# Patient Record
Sex: MALE | Race: WHITE | NOT HISPANIC OR LATINO | Employment: OTHER | URBAN - METROPOLITAN AREA
[De-identification: names, ages, dates, MRNs, and addresses within clinical notes are randomized per-mention and may not be internally consistent; named-entity substitution may affect disease eponyms.]

---

## 2017-04-21 ENCOUNTER — HISTORICAL (OUTPATIENT)
Dept: ADMINISTRATIVE | Facility: HOSPITAL | Age: 77
End: 2017-04-21

## 2017-04-21 LAB
ALBUMIN SERPL-MCNC: 4.1 G/DL (ref 3.1–4.7)
ALP SERPL-CCNC: 51 IU/L (ref 40–104)
ALT (SGPT): 20 IU/L (ref 3–33)
AST SERPL-CCNC: 23 IU/L (ref 10–40)
BASOPHILS NFR BLD: 0.1 K/UL (ref 0–0.2)
BASOPHILS NFR BLD: 1.7 %
BILIRUB SERPL-MCNC: 0.4 MG/DL (ref 0.3–1)
BUN SERPL-MCNC: 29 MG/DL (ref 8–20)
CALCIUM SERPL-MCNC: 9.3 MG/DL (ref 7.7–10.4)
CHLORIDE: 103 MMOL/L (ref 98–110)
CO2 SERPL-SCNC: 29 MMOL/L (ref 22.8–31.6)
CREATININE: 1.45 MG/DL (ref 0.6–1.4)
EOSINOPHIL NFR BLD: 0.2 K/UL (ref 0–0.7)
EOSINOPHIL NFR BLD: 6 %
ERYTHROCYTE [DISTWIDTH] IN BLOOD BY AUTOMATED COUNT: 13.3 % (ref 11.7–14.9)
FERRITIN SERPL-MCNC: 37 NG/ML (ref 37–201)
FOLATE SERPL-MCNC: >24.8 NG/ML (ref 2.2–11.2)
GLUCOSE: 167 MG/DL (ref 70–99)
GRAN #: 1.8 K/UL (ref 1.4–6.5)
GRAN%: 59.7 %
HCT VFR BLD AUTO: 36.6 % (ref 39–55)
HGB BLD-MCNC: 12 G/DL (ref 14–16)
IMMATURE GRANS (ABS): 0 K/UL (ref 0–1)
IMMATURE GRANULOCYTES: 0 %
LYMPH #: 0.7 K/UL (ref 1.2–3.4)
LYMPH%: 24.5 %
MCH RBC QN AUTO: 31.9 PG (ref 25–35)
MCHC RBC AUTO-ENTMCNC: 32.8 G/DL (ref 31–36)
MCV RBC AUTO: 97.3 FL (ref 80–100)
MONO #: 0.2 K/UL (ref 0.1–0.6)
MONO%: 8.1 %
NUCLEATED RBCS: 0 %
PLATELET # BLD AUTO: 142 K/UL (ref 140–440)
PMV BLD AUTO: 10.2 FL (ref 8.8–12.7)
POTASSIUM SERPL-SCNC: 4 MMOL/L (ref 3.5–5)
PROT SERPL-MCNC: 6.7 G/DL (ref 6–8.2)
RBC # BLD AUTO: 3.76 M/UL (ref 4.3–5.9)
SODIUM: 141 MMOL/L (ref 134–144)
TSH SERPL DL<=0.005 MIU/L-ACNC: 2.98 ULU/ML (ref 0.3–5.6)
VITAMIN B12: 1147 PG/ML (ref 62–940)
WBC # BLD AUTO: 3 K/UL (ref 5–10)

## 2017-04-22 LAB
TESTOST SERPL-MCNC: 412 NG/DL (ref 348–1197)
TESTOSTERONE, FREE: NORMAL

## 2017-05-24 ENCOUNTER — TELEPHONE (OUTPATIENT)
Dept: HEMATOLOGY/ONCOLOGY | Facility: CLINIC | Age: 77
End: 2017-05-24

## 2017-05-24 NOTE — TELEPHONE ENCOUNTER
Pt wife called in said that on their last appointment with dr padilla on 05/09/17 Dr. padilla said he was going to write a letter about his condition and send it to him and all his physicians. We do not have record of this letter in paper chart or in epic. Please advise.

## 2017-05-27 ENCOUNTER — TELEPHONE (OUTPATIENT)
Dept: HEMATOLOGY/ONCOLOGY | Facility: CLINIC | Age: 77
End: 2017-05-27

## 2017-09-07 LAB
BASOPHILS NFR BLD: 0.1 K/UL (ref 0–0.2)
BASOPHILS NFR BLD: 1.4 %
EOSINOPHIL NFR BLD: 0.1 K/UL (ref 0–0.7)
EOSINOPHIL NFR BLD: 3.6 %
ERYTHROCYTE [DISTWIDTH] IN BLOOD BY AUTOMATED COUNT: 13.2 % (ref 11.7–14.9)
FERRITIN SERPL-MCNC: 107 NG/ML (ref 37–201)
GRAN #: 2 K/UL (ref 1.4–6.5)
GRAN%: 55.1 %
HCT VFR BLD AUTO: 34.3 % (ref 39–55)
HGB BLD-MCNC: 11.8 G/DL (ref 14–16)
IMMATURE GRANS (ABS): 0 K/UL (ref 0–1)
IMMATURE GRANULOCYTES: 0.6 %
LYMPH #: 1 K/UL (ref 1.2–3.4)
LYMPH%: 28.7 %
MCH RBC QN AUTO: 32.5 PG (ref 25–35)
MCHC RBC AUTO-ENTMCNC: 34.4 G/DL (ref 31–36)
MCV RBC AUTO: 94.5 FL (ref 80–100)
MONO #: 0.4 K/UL (ref 0.1–0.6)
MONO%: 10.6 %
NUCLEATED RBCS: 0 %
PLATELET # BLD AUTO: 148 K/UL (ref 140–440)
PMV BLD AUTO: 10 FL (ref 8.8–12.7)
RBC # BLD AUTO: 3.63 M/UL (ref 4.3–5.9)
WBC # BLD AUTO: 3.6 K/UL (ref 5–10)

## 2017-09-13 ENCOUNTER — OFFICE VISIT (OUTPATIENT)
Dept: HEMATOLOGY/ONCOLOGY | Facility: CLINIC | Age: 77
End: 2017-09-13
Payer: MEDICARE

## 2017-09-13 VITALS
SYSTOLIC BLOOD PRESSURE: 93 MMHG | TEMPERATURE: 98 F | HEART RATE: 82 BPM | RESPIRATION RATE: 18 BRPM | WEIGHT: 190.5 LBS | DIASTOLIC BLOOD PRESSURE: 64 MMHG | BODY MASS INDEX: 28.22 KG/M2 | HEIGHT: 69 IN

## 2017-09-13 DIAGNOSIS — Q27.30 ARTERIOVENOUS MALFORMATION (AVM): ICD-10-CM

## 2017-09-13 DIAGNOSIS — C61 PROSTATE CANCER: ICD-10-CM

## 2017-09-13 DIAGNOSIS — C00.1: ICD-10-CM

## 2017-09-13 DIAGNOSIS — D50.0 IRON DEFICIENCY ANEMIA DUE TO CHRONIC BLOOD LOSS: ICD-10-CM

## 2017-09-13 PROCEDURE — 1159F MED LIST DOCD IN RCRD: CPT | Mod: ,,, | Performed by: INTERNAL MEDICINE

## 2017-09-13 PROCEDURE — 99214 OFFICE O/P EST MOD 30 MIN: CPT | Mod: ,,, | Performed by: INTERNAL MEDICINE

## 2017-09-13 PROCEDURE — 1126F AMNT PAIN NOTED NONE PRSNT: CPT | Mod: ,,, | Performed by: INTERNAL MEDICINE

## 2017-09-13 PROCEDURE — 3008F BODY MASS INDEX DOCD: CPT | Mod: ,,, | Performed by: INTERNAL MEDICINE

## 2017-09-13 RX ORDER — MULTIVITAMIN
1 TABLET ORAL DAILY
COMMUNITY

## 2017-09-13 RX ORDER — ASCORBIC ACID, CHOLECALCIFEROL, PYRIDOXINE HYDROCHLORIDE, LEVOMEFOLATE MAGNESIUM, FOLIC ACID, CYANOCOBALAMIN, AND IRON PENTACARBONYL 170; 1000; 15; 600; 400; 16; 125 MG/1; [IU]/1; MG/1; UG/1; UG/1; UG/1; MG/1
1 TABLET ORAL DAILY
Refills: 2 | COMMUNITY
Start: 2017-06-20 | End: 2021-09-13

## 2017-09-13 RX ORDER — TEMAZEPAM 15 MG/1
30 CAPSULE ORAL NIGHTLY
COMMUNITY

## 2017-09-13 RX ORDER — POTASSIUM CITRATE 10 MEQ/1
20 TABLET, EXTENDED RELEASE ORAL 2 TIMES DAILY
COMMUNITY

## 2017-09-13 NOTE — LETTER
September 15, 2017      Justin Mitchell MD  [address]  [place] [address]           [facility] Hematology Oncology  [address]  [address]  [facility] [address]  Phone: [phone]  Fax: [phone]          Patient: Michael Hawkins   MR Number: [id number]   YOB: 1940   Date of Visit: 9/13/2017       Dear Dr. Justin Mitchell:    Thank you for referring Michael Hawkins to me for evaluation. Attached you will find relevant portions of my assessment and plan of care.    If you have questions, please do not hesitate to call me. I look forward to following Michael Hawkins along with you.    Sincerely,    Lisbeth Aguilar  CC:  No Recipients    If you would like to receive this communication electronically, please contact [email or web address] or [phone] to request more information on [employer] Link access.    For providers and/or their staff who would like to refer a patient to [facility], please contact us through our one-stop-shop provider referral line, [facility] , at [phone].    If you feel you have received this communication in error or would no longer like to receive these types of communications, please e-mail [email or web address]

## 2017-09-17 PROBLEM — Q27.30 ARTERIOVENOUS MALFORMATION (AVM): Status: ACTIVE | Noted: 2017-09-17

## 2017-09-17 PROBLEM — C00.1: Status: ACTIVE | Noted: 2017-09-17

## 2017-09-17 PROBLEM — C61 PROSTATE CANCER: Status: ACTIVE | Noted: 2017-09-17

## 2017-09-17 PROBLEM — D50.0 IRON DEFICIENCY ANEMIA DUE TO CHRONIC BLOOD LOSS: Status: ACTIVE | Noted: 2017-09-17

## 2017-09-17 NOTE — PROGRESS NOTES
[facility] History & Physical    Subjective:      Patient ID:   Michael Hawkins  77 y.o. male  1940  Adan Brandt Perninkle      Chief Complaint:   Anemia follow up    HPI:  77 y.o. male with diagnosis of Fe deficiency anemia, hx of AVM's, cauterized.  Also diverticular dx and radiation proctitis.   Hx of prostate cancer treated with radium seeds.  Also cancer of lip treated with radiation 2010.  Rectal bleeding x's 3 years, radiation proctitis, H/H 6/17.    On synthroid, decrease plavix qod.  Dr. Davis.    Hx HTN, CAD, cholesterol, thyroid, PE, DVT, bipolar hx, sleep apnea, prostate cancer.  Carotid artery dx R > L.    Hx R renal cancer, cryosurgery, Dr. Barton  Coronary stent x's 2 8/2016.    Smoke 1 1/2 ppd x's 25 years, through 1985.  ETOH no  Job  x's 20 years.    Mom bone cancer  Dad heart dx    ROS:   GEN: normal without any fever, night sweats or weight loss  HEENT: See HPI.   no HA's, sore throat, stiff neck, changes in vision  CV: See HPI.   no CP, SOB, PND, ORTEGA or orthopnea  PULM: normal with no SOB, cough, hemoptysis, sputum or pleuritic pain  GI: See HPI.  : See HPI.  BREAST: normal with no mass, discharge, pain  SKIN: normal with no rash, erythema, bruising, or swelling     Past Medical History:   Diagnosis Date    Basal cell carcinoma     Hypertension     Nonmelanoma skin cancer     Prostate cancer      Past Surgical History:   Procedure Laterality Date    FOREARM HARDWARE REMOVAL         Review of patient's allergies indicates:  No Known Allergies  Social History     Social  History    Marital status:      Spouse name: N/A    Number of children: N/A    Years of education: N/A     Occupational History    Not on file.     Social History Main Topics    Smoking status: Former Smoker     Types: Cigarettes     Quit date: 9/13/1992    Smokeless tobacco: Never Used    Alcohol use No    Drug use: No    Sexual activity: Not on file     Other Topics Concern    Not on file     Social History Narrative    No narrative on file         Current Outpatient Prescriptions:     alendronate (FOSAMAX) 70 MG tablet, Take 70 mg by mouth every 7 days., Disp: , Rfl:     amlodipine (NORVASC) 5 MG tablet, Take 5 mg by mouth once daily., Disp: , Rfl:     aspirin 81 MG Chew, Take 81 mg by mouth once daily., Disp: , Rfl:     carbamazepine (TEGRETOL) 200 mg tablet, Take 200 mg by mouth 3 (three) times daily., Disp: , Rfl:     cholecalciferol, vitamin D3, 400 unit Tab, Take by mouth once daily., Disp: , Rfl:     cloNIDine (CATAPRES) 0.1 MG tablet, Take 0.1 mg by mouth 2 (two) times daily., Disp: , Rfl:     clopidogrel (PLAVIX) 75 mg tablet, Take 75 mg by mouth once daily., Disp: , Rfl:     co-enzyme Q-10 30 mg capsule, Take 100 mg by mouth 3 (three) times daily., Disp: , Rfl:     cyclobenzaprine (FLEXERIL) 10 MG tablet, Take 10 mg by mouth 3 (three) times daily as needed for Muscle spasms., Disp: , Rfl:     fish oil-omega-3 fatty acids 300-1,000 mg capsule, Take 2 g by mouth once daily., Disp: , Rfl:     FLUNISOLIDE INHL, Inhale into the lungs., Disp: , Rfl:     fosinopril (MONOPRIL) 40 MG tablet, Take 40 mg by mouth once daily., Disp: , Rfl:     isosorbide mononitrate (IMDUR) 30 MG 24 hr tablet, Take 30 mg by mouth once daily., Disp: , Rfl:     LACTOBAC NO.41/BIFIDOBACT NO.7 (PROBIOTIC-10 ORAL), Take by mouth., Disp: , Rfl:     levothyroxine (SYNTHROID) 150 MCG tablet, Take 150 mcg by mouth once daily., Disp: , Rfl:     metoprolol tartrate (LOPRESSOR) 50 MG tablet, Take 50 mg by mouth  "2 (two) times daily., Disp: , Rfl:     multivitamin (ONE DAILY MULTIVITAMIN) per tablet, Take 1 tablet by mouth once daily., Disp: , Rfl:     nitroGLYCERIN 0.4 MG/DOSE TL SPRY (NITROLINGUAL) 400 mcg/spray spray, Place 1 spray under the tongue every 5 (five) minutes as needed for Chest pain., Disp: , Rfl:     NUFERA 125 mg-1 mg-170 mg-1,000 unit Tab, TK 1 T PO QD, Disp: , Rfl: 2    oxybutynin (DITROPAN-XL) 10 MG 24 hr tablet, Take 10 mg by mouth once daily., Disp: , Rfl:     POTASSIUM CITRATE ORAL, Take by mouth., Disp: , Rfl:     psyllium (METAMUCIL) packet, Take 1 packet by mouth once daily., Disp: , Rfl:     quetiapine (SEROQUEL XR) 400 MG Tb24, Take by mouth once daily., Disp: , Rfl:     rosuvastatin (CRESTOR) 5 MG tablet, Take 40 mg by mouth once daily., Disp: , Rfl:     temazepam (RESTORIL) 22.5 MG capsule, Take 22.5 mg by mouth nightly as needed for Insomnia., Disp: , Rfl:           Objective:   Vitals:  Blood pressure 93/64, pulse 82, temperature 97.9 °F (36.6 °C), resp. rate 18, height 5' 9" (1.753 m), weight 86.4 kg (190 lb 8 oz).    Physical Examination:   GEN: no apparent distress, comfortable  HEAD: atraumatic and normocephalic  EYES: no pallor, no icterus  ENT:no pharyngeal erythema, external ears WNL; no nasal discharge; no thrush  NECK: no masses, thyroid normal, trachea midline, no LAD/LN's, supple  CV: RRR with no murmur; normal pulse; normal S1 and S2; no pedal edema  CHEST: Normal respiratory effort; CTAB; normal breath sounds; no wheeze or crackles  ABDOM: nontender and nondistended; soft; normal bowel sounds; no rebound/guarding  MUSC/Skeletal: ROM normal; no crepitus; joints normal  EXTREM: no clubbing, cyanosis, inflammation or swelling  SKIN: no rashes, lesions, ulcers, petechia  : no alvarez  NEURO: grossly intact; motor/sensory WNL;  no tremors  PSYCH: normal mood, affect and behavior  LYMPH: normal cervical, supraclavicular, axillary and groin LN's      Labs:   Lab Results "   Component Value Date    WBC 3.6 (L) 09/07/2017    HGB 11.8 (L) 09/07/2017    HCT 34.3 (L) 09/07/2017    MCV 94.5 09/07/2017     09/07/2017    CMP  Sodium   Date Value Ref Range Status   04/21/2017 141 134 - 144 mmol/L      Potassium   Date Value Ref Range Status   04/21/2017 4.0 3.5 - 5.0 mmol/L      Chloride   Date Value Ref Range Status   04/21/2017 103 98 - 110 mmol/L      CO2   Date Value Ref Range Status   04/21/2017 29.0 22.8 - 31.6 mmol/L      Glucose   Date Value Ref Range Status   04/21/2017 167 (H) 70 - 99 mg/dL      BUN, Bld   Date Value Ref Range Status   04/21/2017 29 (H) 8 - 20 mg/dL      Creatinine   Date Value Ref Range Status   04/21/2017 1.45 (H) 0.60 - 1.40 mg/dL      Calcium   Date Value Ref Range Status   04/21/2017 9.3 7.7 - 10.4 mg/dL      Total Protein   Date Value Ref Range Status   04/21/2017 6.7 6.0 - 8.2 g/dL      Albumin   Date Value Ref Range Status   04/21/2017 4.1 3.1 - 4.7 g/dL      Total Bilirubin   Date Value Ref Range Status   04/21/2017 0.4 0.3 - 1.0 mg/dL      Alkaline Phosphatase   Date Value Ref Range Status   04/21/2017 51 40 - 104 IU/L      AST   Date Value Ref Range Status   04/21/2017 23 10 - 40 IU/L      I have reviewed all available lab results and radiology reports.    Radiology/Diagnostic Studies:    Hgb 12.8, WBC 3,700, plt cnt 71,000.  MCV 91.  B 12 1136, TSH 6.29,  PSA 0.02.      All lab results and imaging results have been reviewed and discussed with the patient    Assessment:   (1) 77 y.o. male with diagnosis of Fe deficiency anemia, 2nd GI bleeding, 2nd AVM's 2nd Rad Rx.       Better after cauterization of AVM's.    (2)Prostate cancer hx, renal cancer, lower lip cancer.    (3)CAD, carotid dx          1. Iron deficiency anemia due to chronic blood loss    2. Arteriovenous malformation (AVM)    3. Prostate cancer    4. Cancer of external lower lip        Plan:     Return in about 4 months (around 1/13/2018) for check of blood status after  therapy.

## 2018-01-24 LAB
BASOPHILS NFR BLD: 0.1 K/UL (ref 0–0.2)
BASOPHILS NFR BLD: 1 %
EOSINOPHIL NFR BLD: 0.2 K/UL (ref 0–0.7)
EOSINOPHIL NFR BLD: 3.1 %
ERYTHROCYTE [DISTWIDTH] IN BLOOD BY AUTOMATED COUNT: 12.5 % (ref 11.7–14.9)
FERRITIN SERPL-MCNC: 74 NG/ML (ref 37–201)
GRAN #: 3 K/UL (ref 1.4–6.5)
GRAN%: 61.8 %
HCT VFR BLD AUTO: 38 % (ref 39–55)
HGB BLD-MCNC: 12.9 G/DL (ref 14–16)
IMMATURE GRANS (ABS): 0 K/UL (ref 0–1)
IMMATURE GRANULOCYTES: 0.8 %
LYMPH #: 1 K/UL (ref 1.2–3.4)
LYMPH%: 21.3 %
MCH RBC QN AUTO: 31.2 PG (ref 25–35)
MCHC RBC AUTO-ENTMCNC: 33.9 G/DL (ref 31–36)
MCV RBC AUTO: 92 FL (ref 80–100)
MONO #: 0.6 K/UL (ref 0.1–0.6)
MONO%: 12 %
NUCLEATED RBCS: 0 %
PLATELET # BLD AUTO: 165 K/UL (ref 140–440)
PMV BLD AUTO: 9.3 FL (ref 8.8–12.7)
RBC # BLD AUTO: 4.13 M/UL (ref 4.3–5.9)
WBC # BLD AUTO: 4.8 K/UL (ref 5–10)

## 2018-01-31 ENCOUNTER — OFFICE VISIT (OUTPATIENT)
Dept: HEMATOLOGY/ONCOLOGY | Facility: CLINIC | Age: 78
End: 2018-01-31
Payer: MEDICARE

## 2018-01-31 VITALS
SYSTOLIC BLOOD PRESSURE: 102 MMHG | DIASTOLIC BLOOD PRESSURE: 67 MMHG | HEIGHT: 69 IN | BODY MASS INDEX: 29.25 KG/M2 | TEMPERATURE: 98 F | WEIGHT: 197.5 LBS | RESPIRATION RATE: 18 BRPM | HEART RATE: 64 BPM

## 2018-01-31 DIAGNOSIS — Q27.30 ARTERIOVENOUS MALFORMATION (AVM): Primary | ICD-10-CM

## 2018-01-31 DIAGNOSIS — D50.0 IRON DEFICIENCY ANEMIA DUE TO CHRONIC BLOOD LOSS: ICD-10-CM

## 2018-01-31 PROCEDURE — 99214 OFFICE O/P EST MOD 30 MIN: CPT | Mod: ,,, | Performed by: INTERNAL MEDICINE

## 2018-01-31 PROCEDURE — 1159F MED LIST DOCD IN RCRD: CPT | Mod: ,,, | Performed by: INTERNAL MEDICINE

## 2018-01-31 RX ORDER — POTASSIUM CITRATE 10 MEQ/1
10 TABLET, EXTENDED RELEASE ORAL DAILY
COMMUNITY
Start: 2017-12-12 | End: 2021-09-13

## 2018-01-31 RX ORDER — ROSUVASTATIN CALCIUM 40 MG/1
40 TABLET, COATED ORAL DAILY
COMMUNITY
Start: 2018-01-15 | End: 2021-09-13 | Stop reason: SDUPTHER

## 2018-01-31 RX ORDER — LACTULOSE 10 G/15ML
15 SOLUTION ORAL; RECTAL DAILY PRN
COMMUNITY
Start: 2018-01-22 | End: 2021-09-13

## 2018-01-31 NOTE — LETTER
February 3, 2018      Justin Mitchell MD  [address]  [place] [address]           [facility] Hematology Oncology  [address]  [address]  [facility] [address]  Phone: [phone]  Fax: [phone]          Patient: Michael Hawkins   MR Number: [id number]   YOB: 1940   Date of Visit: 1/31/2018       Dear Dr. Justin Mitchell:    Thank you for referring Michael Hawkins to me for evaluation. Attached you will find relevant portions of my assessment and plan of care.    If you have questions, please do not hesitate to call me. I look forward to following Michael Hawkins along with you.    Sincerely,    KAMARI Paris MD    Enclosure  CC:  No Recipients    If you would like to receive this communication electronically, please contact [email or web address] or [phone] to request more information on [employer] Link access.    For providers and/or their staff who would like to refer a patient to [facility], please contact us through our one-stop-shop provider referral line, [facility], at [phone].    If you feel you have received this communication in error or would no longer like to receive these types of communications, please e-mail [email or web address]

## 2018-02-03 NOTE — PROGRESS NOTES
[facility] History & Physical    Subjective:      Patient ID:   Michael Hawkins  77 y.o. male  1940  Adan Brandt Perninkle      Chief Complaint:   Anemia follow up    HPI:  77 y.o. male with diagnosis of Fe deficiency anemia, hx of AVM's, cauterized.  Also diverticular dx and radiation proctitis.   Hx of prostate cancer treated with radium seeds.  Also cancer of lip treated with radiation 2010.  Rectal bleeding x's 3 years, radiation proctitis, H/H 6/17.    S/P cauterization, no further BRBPR.  Was on Fe x's 6 months, now off Fe x's 4 months.  Hgb 13, Ferritin 107 to 74.  Resume FeSO4 and Vitamen C daily.  Refill lactulose.    On synthroid, decrease plavix qod.  Dr. Davis.    Hx HTN, CAD, cholesterol, thyroid, PE, DVT, bipolar hx, sleep apnea, prostate cancer.  Carotid artery dx R > L.    Hx R renal cancer, cryosurgery, Dr. Barton  Coronary stent x's 2 8/2016.    Smoke 1 1/2 ppd x's 25 years, through 1985.  ETOH no  Job  x's 20 years.    Mom bone cancer  Dad heart dx    ROS:   GEN: normal without any fever, night sweats or weight loss  HEENT: See HPI.   no HA's, sore throat, stiff neck, changes in vision  CV: See HPI.   no CP, SOB, PND, ORTEGA or orthopnea  PULM: normal with no SOB, cough, hemoptysis, sputum or pleuritic pain  GI: See HPI.  : See HPI.  BREAST: normal with no mass, discharge, pain  SKIN: normal with no rash, erythema, bruising, or swelling     Past Medical History:   Diagnosis Date    Basal cell carcinoma     Hypertension     Nonmelanoma skin cancer     Prostate cancer      Past  Surgical History:   Procedure Laterality Date    FOREARM HARDWARE REMOVAL         Review of patient's allergies indicates:  No Known Allergies  Social History     Social History    Marital status:      Spouse name: N/A    Number of children: N/A    Years of education: N/A     Occupational History    Not on file.     Social History Main Topics    Smoking status: Former Smoker     Types: Cigarettes     Quit date: 9/13/1992    Smokeless tobacco: Never Used    Alcohol use No    Drug use: No    Sexual activity: Not on file     Other Topics Concern    Not on file     Social History Narrative    No narrative on file         Current Outpatient Prescriptions:     alendronate (FOSAMAX) 70 MG tablet, Take 70 mg by mouth every 7 days., Disp: , Rfl:     amlodipine (NORVASC) 5 MG tablet, Take 5 mg by mouth once daily., Disp: , Rfl:     aspirin 81 MG Chew, Take 81 mg by mouth once daily., Disp: , Rfl:     carbamazepine (TEGRETOL) 200 mg tablet, Take 200 mg by mouth 3 (three) times daily., Disp: , Rfl:     cholecalciferol, vitamin D3, 400 unit Tab, Take by mouth once daily., Disp: , Rfl:     cloNIDine (CATAPRES) 0.1 MG tablet, Take 0.1 mg by mouth 2 (two) times daily., Disp: , Rfl:     clopidogrel (PLAVIX) 75 mg tablet, Take 75 mg by mouth once daily., Disp: , Rfl:     co-enzyme Q-10 30 mg capsule, Take 100 mg by mouth 3 (three) times daily., Disp: , Rfl:     cyclobenzaprine (FLEXERIL) 10 MG tablet, Take 10 mg by mouth 3 (three) times daily as needed for Muscle spasms., Disp: , Rfl:     fish oil-omega-3 fatty acids 300-1,000 mg capsule, Take 2 g by mouth once daily., Disp: , Rfl:     FLUNISOLIDE INHL, Inhale into the lungs., Disp: , Rfl:     fosinopril (MONOPRIL) 40 MG tablet, Take 40 mg by mouth once daily., Disp: , Rfl:     isosorbide mononitrate (IMDUR) 30 MG 24 hr tablet, Take 30 mg by mouth once daily., Disp: , Rfl:     LACTOBAC NO.41/BIFIDOBACT NO.7 (PROBIOTIC-10 ORAL), Take by mouth., Disp: ,  "Rfl:     lactulose (CHRONULAC) 10 gram/15 mL solution, , Disp: , Rfl:     levothyroxine (SYNTHROID) 150 MCG tablet, Take 150 mcg by mouth once daily., Disp: , Rfl:     metoprolol tartrate (LOPRESSOR) 50 MG tablet, Take 50 mg by mouth 2 (two) times daily., Disp: , Rfl:     multivitamin (ONE DAILY MULTIVITAMIN) per tablet, Take 1 tablet by mouth once daily., Disp: , Rfl:     nitroGLYCERIN 0.4 MG/DOSE TL SPRY (NITROLINGUAL) 400 mcg/spray spray, Place 1 spray under the tongue every 5 (five) minutes as needed for Chest pain., Disp: , Rfl:     NUFERA 125 mg-1 mg-170 mg-1,000 unit Tab, TK 1 T PO QD, Disp: , Rfl: 2    oxybutynin (DITROPAN-XL) 10 MG 24 hr tablet, Take 10 mg by mouth once daily., Disp: , Rfl:     POTASSIUM CITRATE ORAL, Take by mouth., Disp: , Rfl:     psyllium (METAMUCIL) packet, Take 1 packet by mouth once daily., Disp: , Rfl:     quetiapine (SEROQUEL XR) 400 MG Tb24, Take by mouth once daily., Disp: , Rfl:     rosuvastatin (CRESTOR) 40 MG Tab, , Disp: , Rfl:     temazepam (RESTORIL) 22.5 MG capsule, Take 22.5 mg by mouth nightly as needed for Insomnia., Disp: , Rfl:     UROCIT-K 10 10 mEq (1,080 mg) TbSR, , Disp: , Rfl:           Objective:   Vitals:  Blood pressure 102/67, pulse 64, temperature 97.9 °F (36.6 °C), resp. rate 18, height 5' 9" (1.753 m), weight 89.6 kg (197 lb 8 oz).    Physical Examination:   GEN: no apparent distress, comfortable  HEAD: atraumatic and normocephalic  EYES: no pallor, no icterus  ENT:no pharyngeal erythema, external ears WNL; no nasal discharge; no thrush  NECK: no masses, thyroid normal, trachea midline, no LAD/LN's, supple  CV: RRR with no murmur; normal pulse; normal S1 and S2; no pedal edema  CHEST: Normal respiratory effort; CTAB; normal breath sounds; no wheeze or crackles  ABDOM: nontender and nondistended; soft; normal bowel sounds; no rebound/guarding  MUSC/Skeletal: ROM normal; no crepitus; joints normal  EXTREM: no clubbing, cyanosis, inflammation or " swelling  SKIN: no rashes, lesions, ulcers, petechia  : no alvarez  NEURO: grossly intact; motor/sensory WNL;  no tremors  PSYCH: normal mood, affect and behavior  LYMPH: normal cervical, supraclavicular, axillary and groin LN's      Labs:   Lab Results   Component Value Date    WBC 4.8 (L) 01/24/2018    HGB 12.9 (L) 01/24/2018    HCT 38.0 (L) 01/24/2018    MCV 92.0 01/24/2018     01/24/2018    CMP  Sodium   Date Value Ref Range Status   04/21/2017 141 134 - 144 mmol/L      Potassium   Date Value Ref Range Status   04/21/2017 4.0 3.5 - 5.0 mmol/L      Chloride   Date Value Ref Range Status   04/21/2017 103 98 - 110 mmol/L      CO2   Date Value Ref Range Status   04/21/2017 29.0 22.8 - 31.6 mmol/L      Glucose   Date Value Ref Range Status   04/21/2017 167 (H) 70 - 99 mg/dL      BUN, Bld   Date Value Ref Range Status   04/21/2017 29 (H) 8 - 20 mg/dL      Creatinine   Date Value Ref Range Status   04/21/2017 1.45 (H) 0.60 - 1.40 mg/dL      Calcium   Date Value Ref Range Status   04/21/2017 9.3 7.7 - 10.4 mg/dL      Total Protein   Date Value Ref Range Status   04/21/2017 6.7 6.0 - 8.2 g/dL      Albumin   Date Value Ref Range Status   04/21/2017 4.1 3.1 - 4.7 g/dL      Total Bilirubin   Date Value Ref Range Status   04/21/2017 0.4 0.3 - 1.0 mg/dL      Alkaline Phosphatase   Date Value Ref Range Status   04/21/2017 51 40 - 104 IU/L      AST   Date Value Ref Range Status   04/21/2017 23 10 - 40 IU/L      I have reviewed all available lab results and radiology reports.    Radiology/Diagnostic Studies:    Hgb 12.8, WBC 3,700, plt cnt 71,000.  MCV 91.  B 12 1136, TSH 6.29,  PSA 0.02.  Hgb 13. Ferritin 107 to 74.    Assessment:   (1) 77 y.o. male with diagnosis of Fe deficiency anemia,      2nd GI bleeding, 2nd AVM's 2nd Rad Rx.       Better after cauterization of AVM's.    (2)Prostate cancer hx, renal cancer, lower lip cancer.    (3)CAD, carotid dx    FeSO4, Vitamen C daily.  [facility] lab CBC, ferritin  And RTC 4 months,  5/2018.

## 2018-06-07 LAB
FERRITIN SERPL-MCNC: 69 NG/ML (ref 37–201)
HCT VFR BLD AUTO: 39.8 % (ref 39–55)
HGB BLD-MCNC: 13.2 G/DL (ref 14–16)
MCH RBC QN AUTO: 31.7 PG (ref 25–35)
MCHC RBC AUTO-ENTMCNC: 33.2 G/DL (ref 31–36)
MCV RBC AUTO: 95.7 FL (ref 80–100)
NUCLEATED RBCS: 0 %
PLATELET # BLD AUTO: 200 K/UL (ref 140–440)
RBC # BLD AUTO: 4.16 M/UL (ref 4.3–5.9)
WBC # BLD AUTO: 5.9 K/UL (ref 5–10)

## 2018-06-12 ENCOUNTER — OFFICE VISIT (OUTPATIENT)
Dept: HEMATOLOGY/ONCOLOGY | Facility: CLINIC | Age: 78
End: 2018-06-12
Payer: MEDICARE

## 2018-06-12 VITALS
DIASTOLIC BLOOD PRESSURE: 57 MMHG | WEIGHT: 182 LBS | HEART RATE: 90 BPM | HEIGHT: 69 IN | TEMPERATURE: 98 F | BODY MASS INDEX: 26.96 KG/M2 | SYSTOLIC BLOOD PRESSURE: 97 MMHG

## 2018-06-12 DIAGNOSIS — D50.0 IRON DEFICIENCY ANEMIA DUE TO CHRONIC BLOOD LOSS: Primary | ICD-10-CM

## 2018-06-12 DIAGNOSIS — C00.1: ICD-10-CM

## 2018-06-12 DIAGNOSIS — C61 PROSTATE CANCER: ICD-10-CM

## 2018-06-12 DIAGNOSIS — Q27.30 ARTERIOVENOUS MALFORMATION (AVM): ICD-10-CM

## 2018-06-12 PROCEDURE — 99214 OFFICE O/P EST MOD 30 MIN: CPT | Mod: ,,, | Performed by: INTERNAL MEDICINE

## 2018-06-12 RX ORDER — AMOXICILLIN AND CLAVULANATE POTASSIUM 875; 125 MG/1; MG/1
TABLET, FILM COATED ORAL
Refills: 0 | COMMUNITY
Start: 2018-04-04 | End: 2020-05-01

## 2018-06-12 NOTE — LETTER
June 12, 2018      Justin Mitchell MD  [address]  [place] [address]           [facility] - Hematology Oncology  [address]  [address]  [facility] [address]  Phone: [phone]  Fax: [phone]          Patient: Michael Hawkins   MR Number: [id number]   YOB: 1940   Date of Visit: 6/12/2018       Dear Dr. Justin Mitchell:    Thank you for referring Michael Hawkins to me for evaluation. Attached you will find relevant portions of my assessment and plan of care.    If you have questions, please do not hesitate to call me. I look forward to following Michael Hawkins along with you.    Sincerely,    KAMARI Paris MD    Enclosure  CC:  No Recipients    If you would like to receive this communication electronically, please contact [email or web address] or [phone] to request more information on [employer] Link access.    For providers and/or their staff who would like to refer a patient to [facility], please contact us through our one-stop-shop provider referral line, [facility], at [phone].    If you feel you have received this communication in error or would no longer like to receive these types of communications, please e-mail [email or web address]

## 2018-06-12 NOTE — PROGRESS NOTES
[facility] History & Physical    Subjective:      Patient ID:   Mihcael Hawkins  78 y.o. male  1940  Adan Brandt Perninkle      Chief Complaint:   Anemia follow up    HPI:  78 y.o. male with diagnosis of Fe deficiency anemia, hx of AVM's, cauterized.  Also diverticular dx and radiation proctitis.   Hx of prostate cancer treated with radium seeds.  Also cancer of lip treated with radiation 2010.  Rectal bleeding x's 3 years, radiation proctitis, H/H 6/17.    S/P cauterization, no further BRBPR.  Was on Fe x's 6 months, now off Fe x's 4 months.  Hgb 13, Ferritin 107 to 74.  Resume FeSO4 and Vitamen C daily.  Refill lactulose.    On synthroid, decrease plavix qod.  Dr. Davis.    Hx HTN, CAD, cholesterol, thyroid, PE, DVT, bipolar hx, sleep apnea, prostate cancer.  Carotid artery dx R > L.    Hx R renal cancer, cryosurgery, Dr. Barton  Coronary stent x's 2 8/2016.    Smoke 1 1/2 ppd x's 25 years, through 1985.  ETOH no  Job  x's 20 years.    Mom bone cancer  Dad heart dx    ROS:   GEN: normal without any fever, night sweats or weight loss  HEENT: See HPI.   no HA's, sore throat, stiff neck, changes in vision  CV: See HPI.   no CP, SOB, PND, ORTEGA or orthopnea  PULM: normal with no SOB, cough, hemoptysis, sputum or pleuritic pain  GI: See HPI.  : See HPI.  BREAST: normal with no mass, discharge, pain  SKIN: normal with no rash, erythema, bruising, or swelling     Past Medical History:   Diagnosis Date    Basal cell carcinoma     Hypertension     Nonmelanoma skin cancer     Prostate cancer      Past  Surgical History:   Procedure Laterality Date    FOREARM HARDWARE REMOVAL         Review of patient's allergies indicates:  No Known Allergies  Social History     Social History    Marital status:      Spouse name: N/A    Number of children: N/A    Years of education: N/A     Occupational History    Not on file.     Social History Main Topics    Smoking status: Former Smoker     Types: Cigarettes     Quit date: 9/13/1992    Smokeless tobacco: Never Used    Alcohol use No    Drug use: No    Sexual activity: Not on file     Other Topics Concern    Not on file     Social History Narrative    No narrative on file         Current Outpatient Prescriptions:     alendronate (FOSAMAX) 70 MG tablet, Take 70 mg by mouth every 7 days., Disp: , Rfl:     amlodipine (NORVASC) 5 MG tablet, Take 5 mg by mouth once daily., Disp: , Rfl:     amoxicillin-clavulanate 875-125mg (AUGMENTIN) 875-125 mg per tablet, TK 1 T PO Q 12 H FOR 10 DAYS, Disp: , Rfl: 0    aspirin 81 MG Chew, Take 81 mg by mouth once daily., Disp: , Rfl:     carbamazepine (TEGRETOL) 200 mg tablet, Take 200 mg by mouth 3 (three) times daily., Disp: , Rfl:     cholecalciferol, vitamin D3, 400 unit Tab, Take by mouth once daily., Disp: , Rfl:     cloNIDine (CATAPRES) 0.1 MG tablet, Take 0.1 mg by mouth 2 (two) times daily., Disp: , Rfl:     clopidogrel (PLAVIX) 75 mg tablet, Take 75 mg by mouth once daily., Disp: , Rfl:     co-enzyme Q-10 30 mg capsule, Take 100 mg by mouth 3 (three) times daily., Disp: , Rfl:     cyclobenzaprine (FLEXERIL) 10 MG tablet, Take 10 mg by mouth 3 (three) times daily as needed for Muscle spasms., Disp: , Rfl:     fish oil-omega-3 fatty acids 300-1,000 mg capsule, Take 2 g by mouth once daily., Disp: , Rfl:     FLUNISOLIDE INHL, Inhale into the lungs., Disp: , Rfl:     fosinopril (MONOPRIL) 40 MG tablet, Take 40 mg by mouth once daily., Disp: , Rfl:     isosorbide mononitrate (IMDUR) 30 MG 24 hr tablet, Take 30  "mg by mouth once daily., Disp: , Rfl:     LACTOBAC NO.41/BIFIDOBACT NO.7 (PROBIOTIC-10 ORAL), Take by mouth., Disp: , Rfl:     lactulose (CHRONULAC) 10 gram/15 mL solution, , Disp: , Rfl:     levothyroxine (SYNTHROID) 150 MCG tablet, Take 150 mcg by mouth once daily., Disp: , Rfl:     metoprolol tartrate (LOPRESSOR) 50 MG tablet, Take 50 mg by mouth 2 (two) times daily., Disp: , Rfl:     multivitamin (ONE DAILY MULTIVITAMIN) per tablet, Take 1 tablet by mouth once daily., Disp: , Rfl:     nitroGLYCERIN 0.4 MG/DOSE TL SPRY (NITROLINGUAL) 400 mcg/spray spray, Place 1 spray under the tongue every 5 (five) minutes as needed for Chest pain., Disp: , Rfl:     NUFERA 125 mg-1 mg-170 mg-1,000 unit Tab, TK 1 T PO QD, Disp: , Rfl: 2    oxybutynin (DITROPAN-XL) 10 MG 24 hr tablet, Take 10 mg by mouth once daily., Disp: , Rfl:     POTASSIUM CITRATE ORAL, Take by mouth., Disp: , Rfl:     psyllium (METAMUCIL) packet, Take 1 packet by mouth once daily., Disp: , Rfl:     quetiapine (SEROQUEL XR) 400 MG Tb24, Take by mouth once daily., Disp: , Rfl:     rosuvastatin (CRESTOR) 40 MG Tab, , Disp: , Rfl:     temazepam (RESTORIL) 22.5 MG capsule, Take 22.5 mg by mouth nightly as needed for Insomnia., Disp: , Rfl:     UROCIT-K 10 10 mEq (1,080 mg) TbSR, , Disp: , Rfl:           Objective:   Vitals:  Blood pressure (!) 97/57, pulse 90, temperature 98.4 °F (36.9 °C), height 5' 9" (1.753 m), weight 82.6 kg (182 lb).    Physical Examination:   GEN: no apparent distress, comfortable  HEAD: atraumatic and normocephalic  EYES: no pallor, no icterus  ENT:no pharyngeal erythema, external ears WNL; no nasal discharge; no thrush  NECK: no masses, thyroid normal, trachea midline, no LAD/LN's, supple  CV: RRR with no murmur; normal pulse; normal S1 and S2; no pedal edema  CHEST: Normal respiratory effort; CTAB; normal breath sounds; no wheeze or crackles  ABDOM: nontender and nondistended; soft; normal bowel sounds; no " rebound/guarding  MUSC/Skeletal: ROM normal; no crepitus; joints normal  EXTREM: no clubbing, cyanosis, inflammation or swelling  SKIN: no rashes, lesions, ulcers, petechia  : no alvarez  NEURO: grossly intact; motor/sensory WNL;  no tremors  PSYCH: normal mood, affect and behavior  LYMPH: normal cervical, supraclavicular, axillary and groin LN's      Labs:   Lab Results   Component Value Date    WBC 5.9 06/07/2018    HGB 13.2 (L) 06/07/2018    HCT 39.8 06/07/2018    MCV 95.7 06/07/2018     06/07/2018    CMP  Sodium   Date Value Ref Range Status   04/21/2017 141 134 - 144 mmol/L      Potassium   Date Value Ref Range Status   04/21/2017 4.0 3.5 - 5.0 mmol/L      Chloride   Date Value Ref Range Status   04/21/2017 103 98 - 110 mmol/L      CO2   Date Value Ref Range Status   04/21/2017 29.0 22.8 - 31.6 mmol/L      Glucose   Date Value Ref Range Status   04/21/2017 167 (H) 70 - 99 mg/dL      BUN, Bld   Date Value Ref Range Status   04/21/2017 29 (H) 8 - 20 mg/dL      Creatinine   Date Value Ref Range Status   04/21/2017 1.45 (H) 0.60 - 1.40 mg/dL      Calcium   Date Value Ref Range Status   04/21/2017 9.3 7.7 - 10.4 mg/dL      Total Protein   Date Value Ref Range Status   04/21/2017 6.7 6.0 - 8.2 g/dL      Albumin   Date Value Ref Range Status   04/21/2017 4.1 3.1 - 4.7 g/dL      Total Bilirubin   Date Value Ref Range Status   04/21/2017 0.4 0.3 - 1.0 mg/dL      Alkaline Phosphatase   Date Value Ref Range Status   04/21/2017 51 40 - 104 IU/L      AST   Date Value Ref Range Status   04/21/2017 23 10 - 40 IU/L      I have reviewed all available lab results and radiology reports.    Radiology/Diagnostic Studies:    Hgb 12.8, WBC 3,700, plt cnt 71,000.  MCV 91.  B 12 1136, TSH 6.29,  PSA 0.02.  Hgb 13. Ferritin 107 to 74.    Assessment:   (1) 78 y.o. male with diagnosis of Fe deficiency anemia,      2nd GI bleeding, 2nd AVM's 2nd Rad Rx.       Better after cauterization of AVM's.    (2)Prostate cancer hx, renal  cancer, lower lip cancer.    (3)CAD, carotid dx    FeSO4, Vitamen C daily.  [facility] lab CBC, ferritin  And RTC 4 months, 5/2018.                                                                                                                                                                                                                                                                                                                                                                                                                                 [facility] History & Physical    Subjective:      Patient ID:   Michael Hawkins  78 y.o. male  1940  Adan Brandt Perninkle      Chief Complaint:   Anemia follow up    HPI:  78 y.o. male with diagnosis of Fe deficiency anemia, hx of AVM's, cauterized.  Also diverticular dx and radiation proctitis.   Hx of prostate cancer treated with radium seeds.  Also cancer of lip treated with radiation 2010.  Rectal bleeding x's 3 years, radiation proctitis, H/H 6/17.    S/P cauterization, no further BRBPR.  Was on Fe x's 6 months, now off Fe x's 4 months.  Hgb 13, Ferritin 107 to 74.  Resume FeSO4 and Vitamen C daily.  Refill lactulose.    On synthroid, decrease plavix qod.  Dr. Davis.    Hx HTN, CAD, cholesterol, thyroid, PE, DVT, bipolar hx, sleep apnea, prostate cancer.  Carotid artery dx R > L.    Hx R renal cancer, cryosurgery, Dr. Barton  Coronary stent x's 2 8/2016.    Smoke 1 1/2 ppd x's 25 years, through 1985.  ETOH no  Job  x's 20 years.    Mom bone cancer  Dad heart dx    ROS:   GEN: normal without any fever, night sweats or weight loss  HEENT: See HPI.   no HA's, sore throat, stiff neck, changes in vision  CV: See HPI.   no CP, SOB, PND, ORTEGA or orthopnea  PULM: normal with no SOB, cough, hemoptysis, sputum or pleuritic pain  GI: See HPI.  : See HPI.  BREAST: normal with no mass, discharge, pain  SKIN: normal with no rash, erythema, bruising, or swelling     Past Medical History:   Diagnosis Date    Basal cell carcinoma     Hypertension     Nonmelanoma skin cancer     Prostate cancer       Past Surgical History:   Procedure Laterality Date    FOREARM HARDWARE REMOVAL         Review of patient's allergies indicates:  No Known Allergies  Social History     Social History    Marital status:      Spouse name: N/A    Number of children: N/A    Years of education: N/A     Occupational History    Not on file.     Social History Main Topics    Smoking status: Former Smoker     Types: Cigarettes     Quit date: 9/13/1992    Smokeless tobacco: Never Used    Alcohol use No    Drug use: No    Sexual activity: Not on file     Other Topics Concern    Not on file     Social History Narrative    No narrative on file         Current Outpatient Prescriptions:     alendronate (FOSAMAX) 70 MG tablet, Take 70 mg by mouth every 7 days., Disp: , Rfl:     amlodipine (NORVASC) 5 MG tablet, Take 5 mg by mouth once daily., Disp: , Rfl:     amoxicillin-clavulanate 875-125mg (AUGMENTIN) 875-125 mg per tablet, TK 1 T PO Q 12 H FOR 10 DAYS, Disp: , Rfl: 0    aspirin 81 MG Chew, Take 81 mg by mouth once daily., Disp: , Rfl:     carbamazepine (TEGRETOL) 200 mg tablet, Take 200 mg by mouth 3 (three) times daily., Disp: , Rfl:     cholecalciferol, vitamin D3, 400 unit Tab, Take by mouth once daily., Disp: , Rfl:     cloNIDine (CATAPRES) 0.1 MG tablet, Take 0.1 mg by mouth 2 (two) times daily., Disp: , Rfl:     clopidogrel (PLAVIX) 75 mg tablet, Take 75 mg by mouth once daily., Disp: , Rfl:     co-enzyme Q-10 30 mg capsule, Take 100 mg by mouth 3 (three) times daily., Disp: , Rfl:     cyclobenzaprine (FLEXERIL) 10 MG tablet, Take 10 mg by mouth 3 (three) times daily as needed for Muscle spasms., Disp: , Rfl:     fish oil-omega-3 fatty acids 300-1,000 mg capsule, Take 2 g by mouth once daily., Disp: , Rfl:     FLUNISOLIDE INHL, Inhale into the lungs., Disp: , Rfl:     fosinopril (MONOPRIL) 40 MG tablet, Take 40 mg by mouth once daily., Disp: , Rfl:     isosorbide mononitrate (IMDUR) 30 MG 24 hr tablet,  Take 30 mg by mouth once daily., Disp: , Rfl:     LACTOBAC NO.41/BIFIDOBACT NO.7 (PROBIOTIC-10 ORAL), Take by mouth., Disp: , Rfl:     lactulose (CHRONULAC) 10 gram/15 mL solution, , Disp: , Rfl:     levothyroxine (SYNTHROID) 150 MCG tablet, Take 150 mcg by mouth once daily., Disp: , Rfl:     metoprolol tartrate (LOPRESSOR) 50 MG tablet, Take 50 mg by mouth 2 (two) times daily., Disp: , Rfl:     multivitamin (ONE DAILY MULTIVITAMIN) per tablet, Take 1 tablet by mouth once daily., Disp: , Rfl:     nitroGLYCERIN 0.4 MG/DOSE TL SPRY (NITROLINGUAL) 400 mcg/spray spray, Place 1 spray under the tongue every 5 (five) minutes as needed for Chest pain., Disp: , Rfl:     NUFERA 125 mg-1 mg-170 mg-1,000 unit Tab, TK 1 T PO QD, Disp: , Rfl: 2    oxybutynin (DITROPAN-XL) 10 MG 24 hr tablet, Take 10 mg by mouth once daily., Disp: , Rfl:     POTASSIUM CITRATE ORAL, Take by mouth., Disp: , Rfl:     psyllium (METAMUCIL) packet, Take 1 packet by mouth once daily., Disp: , Rfl:     quetiapine (SEROQUEL XR) 400 MG Tb24, Take by mouth once daily., Disp: , Rfl:     rosuvastatin (CRESTOR) 40 MG Tab, , Disp: , Rfl:     temazepam (RESTORIL) 22.5 MG capsule, Take 22.5 mg by mouth nightly as needed for Insomnia., Disp: , Rfl:     UROCIT-K 10 10 mEq (1,080 mg) TbSR, , Disp: , Rfl:           Objective:   Vitals:  There were no vitals taken for this visit.    Physical Examination:   GEN: no apparent distress, comfortable  HEAD: atraumatic and normocephalic  EYES: no pallor, no icterus  ENT:no pharyngeal erythema, external ears WNL; no nasal discharge; no thrush  NECK: no masses, thyroid normal, trachea midline, no LAD/LN's, supple  CV: RRR with no murmur; normal pulse; normal S1 and S2; no pedal edema  CHEST: Normal respiratory effort; CTAB; normal breath sounds; no wheeze or crackles  ABDOM: nontender and nondistended; soft; normal bowel sounds; no rebound/guarding  MUSC/Skeletal: ROM normal; no crepitus; joints normal  EXTREM: no  clubbing, cyanosis, inflammation or swelling  SKIN: no rashes, lesions, ulcers, petechia  : no alvarez  NEURO: grossly intact; motor/sensory WNL;  no tremors  PSYCH: normal mood, affect and behavior  LYMPH: normal cervical, supraclavicular, axillary and groin LN's      Labs:   Lab Results   Component Value Date    WBC 5.9 06/07/2018    HGB 13.2 (L) 06/07/2018    HCT 39.8 06/07/2018    MCV 95.7 06/07/2018     06/07/2018    CMP  Sodium   Date Value Ref Range Status   04/21/2017 141 134 - 144 mmol/L      Potassium   Date Value Ref Range Status   04/21/2017 4.0 3.5 - 5.0 mmol/L      Chloride   Date Value Ref Range Status   04/21/2017 103 98 - 110 mmol/L      CO2   Date Value Ref Range Status   04/21/2017 29.0 22.8 - 31.6 mmol/L      Glucose   Date Value Ref Range Status   04/21/2017 167 (H) 70 - 99 mg/dL      BUN, Bld   Date Value Ref Range Status   04/21/2017 29 (H) 8 - 20 mg/dL      Creatinine   Date Value Ref Range Status   04/21/2017 1.45 (H) 0.60 - 1.40 mg/dL      Calcium   Date Value Ref Range Status   04/21/2017 9.3 7.7 - 10.4 mg/dL      Total Protein   Date Value Ref Range Status   04/21/2017 6.7 6.0 - 8.2 g/dL      Albumin   Date Value Ref Range Status   04/21/2017 4.1 3.1 - 4.7 g/dL      Total Bilirubin   Date Value Ref Range Status   04/21/2017 0.4 0.3 - 1.0 mg/dL      Alkaline Phosphatase   Date Value Ref Range Status   04/21/2017 51 40 - 104 IU/L      AST   Date Value Ref Range Status   04/21/2017 23 10 - 40 IU/L      I have reviewed all available lab results and radiology reports.    Radiology/Diagnostic Studies:    Hgb 12.8, WBC 3,700, plt cnt 71,000.  MCV 91.  B 12 1136, TSH 6.29,  PSA 0.02.  Hgb 13. Ferritin 107 to 74.    Assessment:   (1) 78 y.o. male with diagnosis of Fe deficiency anemia,      2nd GI bleeding, 2nd AVM's 2nd Rad Rx.       Better after cauterization of AVM's.    (2)Prostate cancer hx, renal cancer, lower lip cancer.    (3)CAD, carotid dx    FeSO4, Vitamen C daily.  [facility] lab CBC,  ferritin  And RTC 4 months, 5/2018.

## 2018-06-13 NOTE — PROGRESS NOTES
[facility] History & Physical    Subjective:      Patient ID:   Michael Hawkins  78 y.o. male  1940  Demarco Brandt Perninkle      Chief Complaint:   Anemia follow up    HPI:  78 y.o. male with diagnosis of Fe deficiency anemia, hx of AVM's, cauterized.  Also diverticular dx and radiation proctitis.   Hx of prostate cancer treated with radium seeds.  Also cancer of lip treated with radiation 2010.  Rectal bleeding x's 3 years, radiation proctitis, H/H 6/17.    S/P cauterization, no further BRBPR.  Was on Fe x's 6 months, now off Fe x's 9 months.  Hgb 13, Ferritin 107 to 74 to 69.    On lactulose and Zincess per Dr. Pal for constipation sx.    On synthroid, decrease plavix qod.  Dr. Davis.    Hx HTN, CAD, cholesterol, thyroid, PE, DVT, bipolar hx, sleep apnea, prostate cancer.  Carotid artery dx R > L.    Hx R renal cancer, cryosurgery, Dr. Barton  Coronary stent x's 2 8/2016.    Smoke 1 1/2 ppd x's 25 years, through 1985.  ETOH no  Job  x's 20 years.    Mom bone cancer  Dad heart dx    ROS:   GEN: normal without any fever, night sweats or weight loss  HEENT: See HPI.   no HA's, sore throat, stiff neck, changes in vision  CV: See HPI.   no CP, SOB, PND, ORTEGA or orthopnea  PULM: normal with no SOB, cough, hemoptysis, sputum or pleuritic pain  GI: See HPI.  : See HPI.  BREAST: normal with no mass, discharge, pain  SKIN: normal with no rash, erythema, bruising, or swelling     Past Medical History:   Diagnosis Date    Basal cell carcinoma     Hypertension     Nonmelanoma skin cancer     Prostate cancer       Past Surgical History:   Procedure Laterality Date    FOREARM HARDWARE REMOVAL         Review of patient's allergies indicates:  No Known Allergies  Social History     Social History    Marital status:      Spouse name: N/A    Number of children: N/A    Years of education: N/A     Occupational History    Not on file.     Social History Main Topics    Smoking status: Former Smoker     Types: Cigarettes     Quit date: 9/13/1992    Smokeless tobacco: Never Used    Alcohol use No    Drug use: No    Sexual activity: Not on file     Other Topics Concern    Not on file     Social History Narrative    No narrative on file         Current Outpatient Prescriptions:     alendronate (FOSAMAX) 70 MG tablet, Take 70 mg by mouth every 7 days., Disp: , Rfl:     amlodipine (NORVASC) 5 MG tablet, Take 5 mg by mouth once daily., Disp: , Rfl:     amoxicillin-clavulanate 875-125mg (AUGMENTIN) 875-125 mg per tablet, TK 1 T PO Q 12 H FOR 10 DAYS, Disp: , Rfl: 0    aspirin 81 MG Chew, Take 81 mg by mouth once daily., Disp: , Rfl:     carbamazepine (TEGRETOL) 200 mg tablet, Take 200 mg by mouth 3 (three) times daily., Disp: , Rfl:     cholecalciferol, vitamin D3, 400 unit Tab, Take by mouth once daily., Disp: , Rfl:     cloNIDine (CATAPRES) 0.1 MG tablet, Take 0.1 mg by mouth 2 (two) times daily., Disp: , Rfl:     clopidogrel (PLAVIX) 75 mg tablet, Take 75 mg by mouth once daily., Disp: , Rfl:     co-enzyme Q-10 30 mg capsule, Take 100 mg by mouth 3 (three) times daily., Disp: , Rfl:     cyclobenzaprine (FLEXERIL) 10 MG tablet, Take 10 mg by mouth 3 (three) times daily as needed for Muscle spasms., Disp: , Rfl:     fish oil-omega-3 fatty acids 300-1,000 mg capsule, Take 2 g by mouth once daily., Disp: , Rfl:     FLUNISOLIDE INHL, Inhale into the lungs., Disp: , Rfl:     fosinopril (MONOPRIL) 40 MG tablet, Take 40 mg by mouth once daily., Disp: , Rfl:     isosorbide mononitrate (IMDUR) 30 MG 24 hr tablet,  "Take 30 mg by mouth once daily., Disp: , Rfl:     LACTOBAC NO.41/BIFIDOBACT NO.7 (PROBIOTIC-10 ORAL), Take by mouth., Disp: , Rfl:     lactulose (CHRONULAC) 10 gram/15 mL solution, , Disp: , Rfl:     levothyroxine (SYNTHROID) 150 MCG tablet, Take 150 mcg by mouth once daily., Disp: , Rfl:     metoprolol tartrate (LOPRESSOR) 50 MG tablet, Take 50 mg by mouth 2 (two) times daily., Disp: , Rfl:     multivitamin (ONE DAILY MULTIVITAMIN) per tablet, Take 1 tablet by mouth once daily., Disp: , Rfl:     nitroGLYCERIN 0.4 MG/DOSE TL SPRY (NITROLINGUAL) 400 mcg/spray spray, Place 1 spray under the tongue every 5 (five) minutes as needed for Chest pain., Disp: , Rfl:     NUFERA 125 mg-1 mg-170 mg-1,000 unit Tab, TK 1 T PO QD, Disp: , Rfl: 2    oxybutynin (DITROPAN-XL) 10 MG 24 hr tablet, Take 10 mg by mouth once daily., Disp: , Rfl:     POTASSIUM CITRATE ORAL, Take by mouth., Disp: , Rfl:     psyllium (METAMUCIL) packet, Take 1 packet by mouth once daily., Disp: , Rfl:     quetiapine (SEROQUEL XR) 400 MG Tb24, Take by mouth once daily., Disp: , Rfl:     rosuvastatin (CRESTOR) 40 MG Tab, , Disp: , Rfl:     temazepam (RESTORIL) 22.5 MG capsule, Take 22.5 mg by mouth nightly as needed for Insomnia., Disp: , Rfl:     UROCIT-K 10 10 mEq (1,080 mg) TbSR, , Disp: , Rfl:           Objective:   Vitals:  Blood pressure (!) 97/57, pulse 90, temperature 98.4 °F (36.9 °C), height 5' 9" (1.753 m), weight 82.6 kg (182 lb).    Physical Examination:   GEN: no apparent distress, comfortable  HEAD: atraumatic and normocephalic  EYES: no pallor, no icterus  ENT:no pharyngeal erythema, external ears WNL; no nasal discharge; no thrush  NECK: no masses, thyroid normal, trachea midline, no LAD/LN's, supple  CV: RRR with no murmur; normal pulse; normal S1 and S2; no pedal edema  CHEST: Normal respiratory effort; CTAB; normal breath sounds; no wheeze or crackles  ABDOM: nontender and nondistended; soft; normal bowel sounds; no " rebound/guarding  MUSC/Skeletal: ROM normal; no crepitus; joints normal  EXTREM: no clubbing, cyanosis, inflammation or swelling  SKIN: no rashes, lesions, ulcers, petechia  : no alvarez  NEURO: grossly intact; motor/sensory WNL;  no tremors  PSYCH: normal mood, affect and behavior  LYMPH: normal cervical, supraclavicular, axillary and groin LN's      Labs:   Lab Results   Component Value Date    WBC 5.9 06/07/2018    HGB 13.2 (L) 06/07/2018    HCT 39.8 06/07/2018    MCV 95.7 06/07/2018     06/07/2018    CMP      Radiology/Diagnostic Studies:    Hgb 12.8, WBC 3,700, plt cnt 71,000.  MCV 91.  B 12 1136, TSH 6.29,  PSA 0.02.  Hgb 13. Ferritin 107 to 74. to 69.    Assessment:   (1) 78 y.o. male with diagnosis of Fe deficiency anemia,      2nd GI bleeding, 2nd AVM's 2nd Rad Rx.       Better after cauterization of AVM's.  Appears stable off oral Fe.    (2)Prostate cancer hx, renal cancer, lower lip cancer.    (3)CAD, carotid dx    (4)Constipation sx.    He does not feel he needs to return here since Fe deficiency anemia is stable.  He says he will follow up with Lizy Mitchell and Demarco.  Recommend check CBC, Ferritin q 6 months.  RTC here prn.

## 2020-05-01 ENCOUNTER — HOSPITAL ENCOUNTER (EMERGENCY)
Facility: HOSPITAL | Age: 80
Discharge: HOME | End: 2020-05-01
Attending: EMERGENCY MEDICINE
Payer: MEDICARE

## 2020-05-01 VITALS
OXYGEN SATURATION: 98 % | WEIGHT: 180 LBS | BODY MASS INDEX: 26.66 KG/M2 | SYSTOLIC BLOOD PRESSURE: 128 MMHG | DIASTOLIC BLOOD PRESSURE: 68 MMHG | TEMPERATURE: 97 F | RESPIRATION RATE: 18 BRPM | HEIGHT: 69 IN | HEART RATE: 78 BPM

## 2020-05-01 DIAGNOSIS — S12.9XXA CERVICAL TRANSVERSE PROCESS FRACTURE, INITIAL ENCOUNTER: ICD-10-CM

## 2020-05-01 DIAGNOSIS — K80.20 CALCULUS OF GALLBLADDER WITHOUT CHOLECYSTITIS WITHOUT OBSTRUCTION: ICD-10-CM

## 2020-05-01 DIAGNOSIS — S20.212A CONTUSION OF RIB ON LEFT SIDE, INITIAL ENCOUNTER: ICD-10-CM

## 2020-05-01 DIAGNOSIS — S09.90XA CLOSED HEAD INJURY, INITIAL ENCOUNTER: ICD-10-CM

## 2020-05-01 DIAGNOSIS — S43.102A AC SEPARATION, LEFT, INITIAL ENCOUNTER: ICD-10-CM
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
## 2020-05-01 DIAGNOSIS — M47.812 CERVICAL SPONDYLOSIS: ICD-10-CM

## 2020-05-01 DIAGNOSIS — S22.49XA: ICD-10-CM

## 2020-05-01 LAB
ANION GAP SERPL CALC-SCNC: 7 MMOL/L (ref 8–16)
BASOPHILS # BLD AUTO: 0.04 K/UL (ref 0–0.2)
BASOPHILS NFR BLD: 0.5 % (ref 0–1.9)
BUN SERPL-MCNC: 28 MG/DL (ref 8–23)
CALCIUM SERPL-MCNC: 9 MG/DL (ref 8.7–10.5)
CHLORIDE SERPL-SCNC: 104 MMOL/L (ref 95–110)
CO2 SERPL-SCNC: 26 MMOL/L (ref 23–29)
CREAT SERPL-MCNC: 1.1 MG/DL (ref 0.5–1.4)
DIFFERENTIAL METHOD: ABNORMAL
EOSINOPHIL # BLD AUTO: 0.1 K/UL (ref 0–0.5)
EOSINOPHIL NFR BLD: 0.7 % (ref 0–8)
ERYTHROCYTE [DISTWIDTH] IN BLOOD BY AUTOMATED COUNT: 13.5 % (ref 11.5–14.5)
EST. GFR  (AFRICAN AMERICAN): >60 ML/MIN/1.73 M^2
EST. GFR  (NON AFRICAN AMERICAN): >60 ML/MIN/1.73 M^2
GLUCOSE SERPL-MCNC: 178 MG/DL (ref 70–110)
HCT VFR BLD AUTO: 34.4 % (ref 40–54)
HGB BLD-MCNC: 11.3 G/DL (ref 14–18)
IMM GRANULOCYTES # BLD AUTO: 0.04 K/UL (ref 0–0.04)
IMM GRANULOCYTES NFR BLD AUTO: 0.5 % (ref 0–0.5)
LYMPHOCYTES # BLD AUTO: 0.7 K/UL (ref 1–4.8)
LYMPHOCYTES NFR BLD: 9.8 % (ref 18–48)
MCH RBC QN AUTO: 31.1 PG (ref 27–31)
MCHC RBC AUTO-ENTMCNC: 32.8 G/DL (ref 32–36)
MCV RBC AUTO: 95 FL (ref 82–98)
MONOCYTES # BLD AUTO: 0.6 K/UL (ref 0.3–1)
MONOCYTES NFR BLD: 8.2 % (ref 4–15)
NEUTROPHILS # BLD AUTO: 6 K/UL (ref 1.8–7.7)
NEUTROPHILS NFR BLD: 80.3 % (ref 38–73)
NRBC BLD-RTO: 0 /100 WBC
PLATELET # BLD AUTO: 150 K/UL (ref 150–350)
PMV BLD AUTO: 9.8 FL (ref 9.2–12.9)
POTASSIUM SERPL-SCNC: 4.2 MMOL/L (ref 3.5–5.1)
RBC # BLD AUTO: 3.63 M/UL (ref 4.6–6.2)
SODIUM SERPL-SCNC: 137 MMOL/L (ref 136–145)
WBC # BLD AUTO: 7.45 K/UL (ref 3.9–12.7)

## 2020-05-01 PROCEDURE — 25000003 PHARM REV CODE 250: Performed by: EMERGENCY MEDICINE

## 2020-05-01 PROCEDURE — 99900035 HC TECH TIME PER 15 MIN (STAT)

## 2020-05-01 PROCEDURE — 94799 UNLISTED PULMONARY SVC/PX: CPT

## 2020-05-01 PROCEDURE — 94761 N-INVAS EAR/PLS OXIMETRY MLT: CPT

## 2020-05-01 PROCEDURE — 99284 EMERGENCY DEPT VISIT MOD MDM: CPT | Mod: 25

## 2020-05-01 PROCEDURE — 85025 COMPLETE CBC W/AUTO DIFF WBC: CPT

## 2020-05-01 PROCEDURE — 63600175 PHARM REV CODE 636 W HCPCS: Performed by: EMERGENCY MEDICINE

## 2020-05-01 PROCEDURE — 80048 BASIC METABOLIC PNL TOTAL CA: CPT

## 2020-05-01 PROCEDURE — 90471 IMMUNIZATION ADMIN: CPT | Performed by: EMERGENCY MEDICINE

## 2020-05-01 PROCEDURE — 90714 TD VACC NO PRESV 7 YRS+ IM: CPT | Performed by: EMERGENCY MEDICINE

## 2020-05-01 RX ORDER — MUPIROCIN 20 MG/G
OINTMENT TOPICAL 3 TIMES DAILY
Qty: 30 G | Refills: 1 | Status: SHIPPED | OUTPATIENT
Start: 2020-05-01 | End: 2021-09-13

## 2020-05-01 RX ORDER — MUPIROCIN 20 MG/G
1 OINTMENT TOPICAL
Status: COMPLETED | OUTPATIENT
Start: 2020-05-01 | End: 2020-05-01

## 2020-05-01 RX ORDER — HYDROCODONE BITARTRATE AND ACETAMINOPHEN 5; 325 MG/1; MG/1
1 TABLET ORAL EVERY 4 HOURS PRN
Qty: 18 TABLET | Refills: 0 | Status: SHIPPED | OUTPATIENT
Start: 2020-05-01 | End: 2021-09-13

## 2020-05-01 RX ADMIN — TETANUS AND DIPHTHERIA TOXOIDS ADSORBED 0.5 ML: 2; 2 INJECTION INTRAMUSCULAR at 09:05

## 2020-05-01 RX ADMIN — MUPIROCIN 22 G: 20 OINTMENT TOPICAL at 10:05
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
## 2020-05-01 NOTE — DISCHARGE INSTRUCTIONS
Watch abrasions for any signs of infection.  Use the incentive spirometer.  Do not put any binders on the chest.  Wear the shoulder immobilizer.  Watch for any worsening chest pain or development of shortness of breath.  Ice pack to the affected areas for 48 hr.  Take Norco every 4-6 hours if needed for pain.  Return to the ER as needed

## 2020-05-01 NOTE — CARE UPDATE
05/01/20 1055   Patient Assessment/Suction   Level of Consciousness (AVPU) alert   Respiratory Effort Unlabored   Expansion/Accessory Muscles/Retractions no use of accessory muscles   All Lung Fields Breath Sounds pleural rub;clear   Rhythm/Pattern, Respiratory unlabored;pattern regular;depth regular   Cough Frequency no cough   PRE-TX-O2   O2 Device (Oxygen Therapy) room air   SpO2 98 %   Pulse Oximetry Type Continuous   $ Pulse Oximetry - Multiple Charge Pulse Oximetry - Multiple   Oximetry Probe Site Assessed   Pulse 78   /68   Incentive Spirometer   $ Incentive Spirometer Charges done with encouragement   Incentive Spirometer Predicted Level (mL) 1800   Administration (IS) mouthpiece   Number of Repetitions (IS) 10   Level Incentive Spirometer (mL) 1200   Patient Tolerance (IS) good   Respiratory Evaluation   $ Care Plan Tech Time 15 min

## 2020-08-10 DIAGNOSIS — C64.1 MALIGNANT NEOPLASM OF RIGHT KIDNEY, EXCEPT RENAL PELVIS: Primary | ICD-10-CM

## 2020-08-19 ENCOUNTER — HOSPITAL ENCOUNTER (OUTPATIENT)
Dept: RADIOLOGY | Facility: HOSPITAL | Age: 80
Discharge: HOME | End: 2020-08-19
Attending: SPECIALIST
Payer: MEDICARE

## 2020-08-19 DIAGNOSIS — C64.1 MALIGNANT NEOPLASM OF RIGHT KIDNEY, EXCEPT RENAL PELVIS: ICD-10-CM

## 2020-08-19 PROCEDURE — 76770 US EXAM ABDO BACK WALL COMP: CPT | Mod: TC,PO

## 2021-02-09 ENCOUNTER — OFFICE VISIT (OUTPATIENT)
Dept: CARDIOLOGY | Facility: CLINIC | Age: 81
End: 2021-02-09
Payer: MEDICARE

## 2021-02-09 VITALS
DIASTOLIC BLOOD PRESSURE: 80 MMHG | HEIGHT: 69 IN | RESPIRATION RATE: 18 BRPM | WEIGHT: 201 LBS | SYSTOLIC BLOOD PRESSURE: 124 MMHG | OXYGEN SATURATION: 98 % | BODY MASS INDEX: 29.77 KG/M2 | HEART RATE: 68 BPM

## 2021-02-09 DIAGNOSIS — I25.10 CORONARY ARTERY DISEASE INVOLVING NATIVE CORONARY ARTERY OF NATIVE HEART WITHOUT ANGINA PECTORIS: Primary | ICD-10-CM

## 2021-02-09 DIAGNOSIS — E78.2 MIXED HYPERLIPIDEMIA: ICD-10-CM

## 2021-02-09 DIAGNOSIS — I10 ESSENTIAL HYPERTENSION: ICD-10-CM

## 2021-02-09 PROCEDURE — 99213 PR OFFICE/OUTPT VISIT, EST, LEVL III, 20-29 MIN: ICD-10-PCS | Mod: S$GLB,,, | Performed by: INTERNAL MEDICINE

## 2021-02-09 PROCEDURE — 99213 OFFICE O/P EST LOW 20 MIN: CPT | Mod: S$GLB,,, | Performed by: INTERNAL MEDICINE

## 2021-03-19 ENCOUNTER — TELEPHONE (OUTPATIENT)
Dept: CARDIOLOGY | Facility: CLINIC | Age: 81
End: 2021-03-19

## 2021-09-13 ENCOUNTER — OFFICE VISIT (OUTPATIENT)
Dept: CARDIOLOGY | Facility: CLINIC | Age: 81
End: 2021-09-13
Payer: MEDICARE

## 2021-09-13 VITALS
RESPIRATION RATE: 16 BRPM | OXYGEN SATURATION: 99 % | WEIGHT: 189 LBS | SYSTOLIC BLOOD PRESSURE: 122 MMHG | DIASTOLIC BLOOD PRESSURE: 78 MMHG | BODY MASS INDEX: 27.99 KG/M2 | HEIGHT: 69 IN | HEART RATE: 67 BPM

## 2021-09-13 DIAGNOSIS — R09.89 LABILE BLOOD PRESSURE: ICD-10-CM

## 2021-09-13 DIAGNOSIS — E07.9 THYROID DYSFUNCTION: ICD-10-CM

## 2021-09-13 DIAGNOSIS — I10 ESSENTIAL HYPERTENSION: ICD-10-CM

## 2021-09-13 DIAGNOSIS — R94.31 NONSPECIFIC ABNORMAL ELECTROCARDIOGRAM (ECG) (EKG): ICD-10-CM

## 2021-09-13 DIAGNOSIS — E78.2 MIXED HYPERLIPIDEMIA: ICD-10-CM

## 2021-09-13 DIAGNOSIS — D50.0 IRON DEFICIENCY ANEMIA DUE TO CHRONIC BLOOD LOSS: ICD-10-CM

## 2021-09-13 DIAGNOSIS — D64.9 ANEMIA, UNSPECIFIED TYPE: ICD-10-CM

## 2021-09-13 DIAGNOSIS — I25.10 CORONARY ARTERY DISEASE INVOLVING NATIVE CORONARY ARTERY OF NATIVE HEART WITHOUT ANGINA PECTORIS: Primary | ICD-10-CM

## 2021-09-13 PROCEDURE — 99214 OFFICE O/P EST MOD 30 MIN: CPT | Mod: S$GLB,,, | Performed by: INTERNAL MEDICINE

## 2021-09-13 PROCEDURE — 99214 PR OFFICE/OUTPT VISIT, EST, LEVL IV, 30-39 MIN: ICD-10-PCS | Mod: S$GLB,,, | Performed by: INTERNAL MEDICINE

## 2021-09-13 PROCEDURE — 93000 EKG 12-LEAD: ICD-10-PCS | Mod: S$GLB,,, | Performed by: INTERNAL MEDICINE

## 2021-09-13 PROCEDURE — 93000 ELECTROCARDIOGRAM COMPLETE: CPT | Mod: S$GLB,,, | Performed by: INTERNAL MEDICINE

## 2021-09-13 RX ORDER — TERAZOSIN 1 MG/1
1 CAPSULE ORAL NIGHTLY
Qty: 30 CAPSULE | Refills: 11 | Status: SHIPPED | OUTPATIENT
Start: 2021-09-13 | End: 2022-08-12 | Stop reason: SDUPTHER

## 2021-09-13 RX ORDER — EZETIMIBE 10 MG/1
10 TABLET ORAL DAILY
Qty: 90 TABLET | Refills: 3 | Status: SHIPPED | OUTPATIENT
Start: 2021-09-13 | End: 2022-08-23

## 2021-09-13 RX ORDER — EZETIMIBE 10 MG/1
10 TABLET ORAL DAILY
Qty: 90 TABLET | Refills: 3 | Status: SHIPPED | OUTPATIENT
Start: 2021-09-13 | End: 2021-09-13

## 2021-09-13 RX ORDER — CLONIDINE HYDROCHLORIDE 0.1 MG/1
0.1 TABLET ORAL 2 TIMES DAILY PRN
Qty: 90 TABLET | Refills: 3 | Status: SHIPPED | OUTPATIENT
Start: 2021-09-13 | End: 2021-09-13

## 2021-09-13 RX ORDER — ROSUVASTATIN CALCIUM 40 MG/1
40 TABLET, COATED ORAL DAILY
Qty: 90 TABLET | Refills: 3 | Status: SHIPPED | OUTPATIENT
Start: 2021-09-13 | End: 2022-10-03

## 2021-09-13 RX ORDER — CLONIDINE HYDROCHLORIDE 0.1 MG/1
0.1 TABLET ORAL 2 TIMES DAILY PRN
Qty: 90 TABLET | Refills: 3 | Status: ON HOLD | OUTPATIENT
Start: 2021-09-13 | End: 2024-03-24 | Stop reason: HOSPADM

## 2021-09-13 RX ORDER — CLOPIDOGREL BISULFATE 75 MG/1
75 TABLET ORAL EVERY OTHER DAY
Qty: 90 TABLET | Refills: 3 | Status: SHIPPED | OUTPATIENT
Start: 2021-09-13 | End: 2021-09-13

## 2021-09-13 RX ORDER — TERAZOSIN 1 MG/1
1 CAPSULE ORAL NIGHTLY
Qty: 30 CAPSULE | Refills: 11 | Status: SHIPPED | OUTPATIENT
Start: 2021-09-13 | End: 2021-09-13

## 2021-09-13 RX ORDER — CLOPIDOGREL BISULFATE 75 MG/1
75 TABLET ORAL EVERY OTHER DAY
Qty: 90 TABLET | Refills: 3 | Status: SHIPPED | OUTPATIENT
Start: 2021-09-13 | End: 2022-12-07

## 2021-09-17 ENCOUNTER — TELEPHONE (OUTPATIENT)
Dept: CARDIOLOGY | Facility: CLINIC | Age: 81
End: 2021-09-17

## 2021-09-17 DIAGNOSIS — I25.10 CORONARY ARTERY DISEASE INVOLVING NATIVE CORONARY ARTERY OF NATIVE HEART WITHOUT ANGINA PECTORIS: Primary | ICD-10-CM

## 2021-10-11 ENCOUNTER — TELEPHONE (OUTPATIENT)
Dept: CARDIOLOGY | Facility: CLINIC | Age: 81
End: 2021-10-11

## 2021-10-31 DIAGNOSIS — C64.1 MALIGNANT NEOPLASM OF RIGHT KIDNEY, EXCEPT RENAL PELVIS: Primary | ICD-10-CM

## 2021-10-31 DIAGNOSIS — N20.0 URIC ACID NEPHROLITHIASIS: ICD-10-CM

## 2021-11-01 ENCOUNTER — HOSPITAL ENCOUNTER (OUTPATIENT)
Dept: RADIOLOGY | Facility: HOSPITAL | Age: 81
Discharge: HOME | End: 2021-11-01
Attending: SPECIALIST
Payer: MEDICARE

## 2021-11-01 DIAGNOSIS — N20.0 URIC ACID NEPHROLITHIASIS: ICD-10-CM

## 2021-11-01 DIAGNOSIS — C64.1 MALIGNANT NEOPLASM OF RIGHT KIDNEY, EXCEPT RENAL PELVIS: ICD-10-CM

## 2021-11-01 PROCEDURE — 76770 US EXAM ABDO BACK WALL COMP: CPT | Mod: TC,PO

## 2021-11-08 ENCOUNTER — LAB VISIT (OUTPATIENT)
Dept: LAB | Facility: HOSPITAL | Age: 81
End: 2021-11-08
Attending: SPECIALIST
Payer: MEDICARE

## 2021-11-08 DIAGNOSIS — C61 MALIGNANT NEOPLASM OF PROSTATE: Primary | ICD-10-CM

## 2021-11-08 DIAGNOSIS — C64.1 MALIGNANT NEOPLASM OF RIGHT KIDNEY, EXCEPT RENAL PELVIS: ICD-10-CM

## 2021-11-08 LAB
ANION GAP SERPL CALC-SCNC: 9 MMOL/L (ref 8–16)
BUN SERPL-MCNC: 16 MG/DL (ref 8–23)
CALCIUM SERPL-MCNC: 9.3 MG/DL (ref 8.7–10.5)
CHLORIDE SERPL-SCNC: 93 MMOL/L (ref 95–110)
CO2 SERPL-SCNC: 26 MMOL/L (ref 23–29)
COMPLEXED PSA SERPL-MCNC: <0.01 NG/ML (ref 0–4)
CREAT SERPL-MCNC: 1 MG/DL (ref 0.5–1.4)
EST. GFR  (AFRICAN AMERICAN): >60 ML/MIN/1.73 M^2
EST. GFR  (NON AFRICAN AMERICAN): >60 ML/MIN/1.73 M^2
GLUCOSE SERPL-MCNC: 104 MG/DL (ref 70–110)
POTASSIUM SERPL-SCNC: 4.8 MMOL/L (ref 3.5–5.1)
SODIUM SERPL-SCNC: 128 MMOL/L (ref 136–145)

## 2021-11-08 PROCEDURE — 84153 ASSAY OF PSA TOTAL: CPT | Performed by: SPECIALIST

## 2021-11-08 PROCEDURE — 80048 BASIC METABOLIC PNL TOTAL CA: CPT | Performed by: SPECIALIST

## 2021-11-08 PROCEDURE — 36415 COLL VENOUS BLD VENIPUNCTURE: CPT | Performed by: SPECIALIST

## 2021-11-10 DIAGNOSIS — C64.1 MALIGNANT NEOPLASM OF RIGHT KIDNEY, EXCEPT RENAL PELVIS: Primary | ICD-10-CM

## 2021-11-10 DIAGNOSIS — N20.0 URIC ACID NEPHROLITHIASIS: ICD-10-CM

## 2021-11-19 ENCOUNTER — TELEPHONE (OUTPATIENT)
Dept: CARDIOLOGY | Facility: CLINIC | Age: 81
End: 2021-11-19
Payer: MEDICARE

## 2022-05-09 ENCOUNTER — TELEPHONE (OUTPATIENT)
Dept: CARDIOLOGY | Facility: CLINIC | Age: 82
End: 2022-05-09
Payer: MEDICARE

## 2022-05-09 NOTE — TELEPHONE ENCOUNTER
----- Message from Erlin Wade sent at 5/9/2022  3:20 PM CDT -----  Contact: pt  Type: Needs Medical Advice    Who Called:pt  Best Call Back Number:[phone] leave message and appt if need be     Additional Information: needing to rs stress test, nuclear please call back    Please Advise-Thank you

## 2022-06-08 DIAGNOSIS — I25.10 CORONARY ARTERY DISEASE INVOLVING NATIVE CORONARY ARTERY OF NATIVE HEART WITHOUT ANGINA PECTORIS: Primary | ICD-10-CM

## 2022-06-08 DIAGNOSIS — R94.31 ABNORMAL ELECTROCARDIOGRAM (ECG) (EKG): ICD-10-CM

## 2022-06-08 DIAGNOSIS — I10 ESSENTIAL HYPERTENSION: ICD-10-CM

## 2022-06-08 DIAGNOSIS — E78.2 MIXED HYPERLIPIDEMIA: ICD-10-CM

## 2022-06-09 ENCOUNTER — HOSPITAL ENCOUNTER (OUTPATIENT)
Dept: RADIOLOGY | Facility: HOSPITAL | Age: 82
Discharge: HOME | End: 2022-06-09
Attending: INTERNAL MEDICINE
Payer: MEDICARE

## 2022-06-09 ENCOUNTER — CLINICAL SUPPORT (OUTPATIENT)
Dept: CARDIOLOGY | Facility: HOSPITAL | Age: 82
End: 2022-06-09
Attending: INTERNAL MEDICINE
Payer: MEDICARE

## 2022-06-09 DIAGNOSIS — R94.31 ABNORMAL ELECTROCARDIOGRAM (ECG) (EKG): ICD-10-CM

## 2022-06-09 DIAGNOSIS — E78.2 MIXED HYPERLIPIDEMIA: ICD-10-CM

## 2022-06-09 DIAGNOSIS — I10 ESSENTIAL HYPERTENSION: ICD-10-CM

## 2022-06-09 DIAGNOSIS — I25.10 CORONARY ARTERY DISEASE INVOLVING NATIVE CORONARY ARTERY OF NATIVE HEART WITHOUT ANGINA PECTORIS: ICD-10-CM

## 2022-06-09 LAB
CV PHARM DOSE: 0.4 MG
CV STRESS BASE HR: 67 BPM
DIASTOLIC BLOOD PRESSURE: 89 MMHG
OHS CV CPX 1 MINUTE RECOVERY HEART RATE: 78 BPM
OHS CV CPX 85 PERCENT MAX PREDICTED HEART RATE MALE: 116
OHS CV CPX MAX PREDICTED HEART RATE: 137
OHS CV CPX PATIENT IS FEMALE: 0
OHS CV CPX PATIENT IS MALE: 1
OHS CV CPX PEAK DIASTOLIC BLOOD PRESSURE: 84 MMHG
OHS CV CPX PEAK HEAR RATE: 78 BPM
OHS CV CPX PEAK RATE PRESSURE PRODUCT: NORMAL
OHS CV CPX PEAK SYSTOLIC BLOOD PRESSURE: 154 MMHG
OHS CV CPX PERCENT MAX PREDICTED HEART RATE ACHIEVED: 57
OHS CV CPX RATE PRESSURE PRODUCT PRESENTING: 9581
SYSTOLIC BLOOD PRESSURE: 143 MMHG

## 2022-06-09 PROCEDURE — 93016 CV STRESS TEST SUPVJ ONLY: CPT | Mod: ,,, | Performed by: INTERNAL MEDICINE

## 2022-06-09 PROCEDURE — A9502 TC99M TETROFOSMIN: HCPCS

## 2022-06-09 PROCEDURE — 63600175 PHARM REV CODE 636 W HCPCS: Performed by: INTERNAL MEDICINE

## 2022-06-09 PROCEDURE — 93018 NUCLEAR STRESS TEST (CUPID ONLY): ICD-10-PCS | Mod: ,,, | Performed by: INTERNAL MEDICINE

## 2022-06-09 PROCEDURE — 93016 NUCLEAR STRESS TEST (CUPID ONLY): ICD-10-PCS | Mod: ,,, | Performed by: INTERNAL MEDICINE

## 2022-06-09 PROCEDURE — 93017 CV STRESS TEST TRACING ONLY: CPT

## 2022-06-09 PROCEDURE — 93018 CV STRESS TEST I&R ONLY: CPT | Mod: ,,, | Performed by: INTERNAL MEDICINE

## 2022-06-09 RX ORDER — REGADENOSON 0.08 MG/ML
0.4 INJECTION, SOLUTION INTRAVENOUS
Status: COMPLETED | OUTPATIENT
Start: 2022-06-09 | End: 2022-06-09

## 2022-06-09 RX ADMIN — REGADENOSON 0.4 MG: 0.08 INJECTION, SOLUTION INTRAVENOUS at 10:06

## 2022-06-22 ENCOUNTER — TELEPHONE (OUTPATIENT)
Dept: CARDIOLOGY | Facility: CLINIC | Age: 82
End: 2022-06-22
Payer: MEDICARE

## 2022-06-22 NOTE — TELEPHONE ENCOUNTER
Called # below, wife state pt is not at home and will be stopping by the office for assistance with below request. .

## 2022-06-22 NOTE — TELEPHONE ENCOUNTER
----- Message from Diana Bardales sent at 6/22/2022 11:32 AM CDT -----  Regarding: appointment  Contact: patient  Patient want to speak with a nurse regarding scheduling follow up appointment, please call back at [phone] (home) also need to speak with nurse regarding blood pressure meds    Case number [id number]

## 2022-06-27 ENCOUNTER — TELEPHONE (OUTPATIENT)
Dept: CARDIOLOGY | Facility: CLINIC | Age: 82
End: 2022-06-27
Payer: MEDICARE

## 2022-08-12 ENCOUNTER — OFFICE VISIT (OUTPATIENT)
Dept: CARDIOLOGY | Facility: CLINIC | Age: 82
End: 2022-08-12
Payer: MEDICARE

## 2022-08-12 VITALS
HEART RATE: 71 BPM | RESPIRATION RATE: 16 BRPM | WEIGHT: 182 LBS | BODY MASS INDEX: 26.96 KG/M2 | OXYGEN SATURATION: 99 % | DIASTOLIC BLOOD PRESSURE: 70 MMHG | HEIGHT: 69 IN | SYSTOLIC BLOOD PRESSURE: 138 MMHG

## 2022-08-12 DIAGNOSIS — R94.31 NONSPECIFIC ABNORMAL ELECTROCARDIOGRAM (ECG) (EKG): Primary | ICD-10-CM

## 2022-08-12 DIAGNOSIS — I25.10 CORONARY ARTERY DISEASE INVOLVING NATIVE CORONARY ARTERY OF NATIVE HEART WITHOUT ANGINA PECTORIS: ICD-10-CM

## 2022-08-12 DIAGNOSIS — I10 ESSENTIAL HYPERTENSION: ICD-10-CM

## 2022-08-12 DIAGNOSIS — D50.0 IRON DEFICIENCY ANEMIA DUE TO CHRONIC BLOOD LOSS: ICD-10-CM

## 2022-08-12 DIAGNOSIS — E78.2 MIXED HYPERLIPIDEMIA: ICD-10-CM

## 2022-08-12 PROCEDURE — 93000 ELECTROCARDIOGRAM COMPLETE: CPT | Mod: S$GLB,,, | Performed by: INTERNAL MEDICINE

## 2022-08-12 PROCEDURE — 99214 OFFICE O/P EST MOD 30 MIN: CPT | Mod: 25,S$GLB,, | Performed by: INTERNAL MEDICINE

## 2022-08-12 PROCEDURE — 99214 PR OFFICE/OUTPT VISIT, EST, LEVL IV, 30-39 MIN: ICD-10-PCS | Mod: 25,S$GLB,, | Performed by: INTERNAL MEDICINE

## 2022-08-12 PROCEDURE — 93000 EKG 12-LEAD: ICD-10-PCS | Mod: S$GLB,,, | Performed by: INTERNAL MEDICINE

## 2022-08-12 RX ORDER — ASCORBIC ACID 500 MG
250 TABLET ORAL DAILY
COMMUNITY

## 2022-08-12 RX ORDER — ISOSORBIDE MONONITRATE 60 MG/1
60 TABLET, EXTENDED RELEASE ORAL DAILY
Qty: 90 TABLET | Refills: 3 | Status: SHIPPED | OUTPATIENT
Start: 2022-08-12 | End: 2022-11-09 | Stop reason: SDUPTHER

## 2022-08-12 RX ORDER — NITROGLYCERIN 0.4 MG/1
0.4 TABLET SUBLINGUAL EVERY 5 MIN PRN
COMMUNITY

## 2022-08-12 RX ORDER — FLUTICASONE PROPIONATE 50 MCG
1 SPRAY, SUSPENSION (ML) NASAL DAILY
COMMUNITY

## 2022-08-12 RX ORDER — METOPROLOL SUCCINATE 50 MG/1
50 TABLET, EXTENDED RELEASE ORAL DAILY
Status: ON HOLD | COMMUNITY
End: 2024-03-24 | Stop reason: HOSPADM

## 2022-08-12 RX ORDER — TERAZOSIN 2 MG/1
2 CAPSULE ORAL NIGHTLY
Qty: 90 CAPSULE | Refills: 3 | Status: SHIPPED | OUTPATIENT
Start: 2022-08-12 | End: 2022-08-12 | Stop reason: SDUPTHER

## 2022-08-12 RX ORDER — FERROUS SULFATE 324(65)MG
324 TABLET, DELAYED RELEASE (ENTERIC COATED) ORAL DAILY
COMMUNITY

## 2022-08-12 RX ORDER — TERAZOSIN 2 MG/1
2 CAPSULE ORAL NIGHTLY
Qty: 90 CAPSULE | Refills: 3 | Status: SHIPPED | OUTPATIENT
Start: 2022-08-12 | End: 2022-08-17

## 2022-08-12 NOTE — PROGRESS NOTES
Subjective:    Patient ID:  Michael Hawkins is a 82 y.o. male patient here for evaluation Coronary Artery Disease, Hypertension, Hyperlipidemia (Check up  annual ), and Results (Stress results)      History of Present Illness:   Patient is an 82-year-old gentleman with history of known coronary artery disease previous stent placement arterial hypertension dyslipidemia continues to have some labile fluctuations are his blood pressure.  Reportedly had prior revascularization fiber 7 years ago I do not have adequate information on this.  And he had been experience some fatigue and tiredness up to 3-4 hours of working in the yd of playing golf.  He rests for a while as symptoms improve and goes back to workup again are.  And he denies having any sustained arm neck or jaw pain some fatigue and tiredness is present.  No swelling in the lower extremities no cough or congestion and no fevers or chills.    His EKG shows sinus rhythm with ST T-wave abnormalities in the lateral leads this was also present in  EKG.  Today 2022 patient wishes to defer any angiographic assessment at this time.          Review of patient's allergies indicates:  No Known Allergies    Past Medical History:   Diagnosis Date    Basal cell carcinoma     Coronary artery disease     cardiac stents    Hypertension     Nonmelanoma skin cancer     Prostate cancer     Pulmonary embolism    Coronary angiogram and resolute integrity 3.5 x 15 mm DEBBIE and proximal circumflex,  Distal circumflex artery 2.25 x 14 mm on 2016, [facility]  Past Surgical History:   Procedure Laterality Date    FOREARM HARDWARE REMOVAL       Social History     Tobacco Use    Smoking status: Former Smoker     Types: Cigarettes     Quit date: 1992     Years since quittin.9    Smokeless tobacco: Never Used   Substance Use Topics    Alcohol use: No    Drug use: No        Review of Systems:    As noted in HPI in addition      REVIEW OF  SYSTEMS  CARDIOVASCULAR: No recent chest pain, palpitations, arm, neck, or jaw pain  RESPIRATORY: No recent fever, cough chills, some shortness of breath with moderate effort resolves with rest    GI: No Nausea, vomiting, constipation, diarrhea, blood, or reflux.  MUSCULOSKELETAL: No myalgias  NEURO: No lightheadedness or dizziness  EYES: No Double vision, blurry, vision or headache              Objective        Vitals:    08/12/22 1012   BP: 138/70   Pulse: 71   Resp: 16       LIPIDS - LAST 2   No results found for: CHOL, HDL, LDLCALC, TRIG, CHOLHDL    CBC - LAST 2  Lab Results   Component Value Date    WBC 7.45 05/01/2020    WBC 5.9 06/07/2018    RBC 3.63 (L) 05/01/2020    RBC 4.16 (L) 06/07/2018    HGB 11.3 (L) 05/01/2020    HGB 13.2 (L) 06/07/2018    HCT 34.4 (L) 05/01/2020    HCT 39.8 06/07/2018    MCV 95 05/01/2020    MCV 95.7 06/07/2018    MCH 31.1 (H) 05/01/2020    MCH 31.7 06/07/2018    MCHC 32.8 05/01/2020    MCHC 33.2 06/07/2018    RDW 13.5 05/01/2020    RDW 12.5 01/24/2018     05/01/2020     06/07/2018    MPV 9.8 05/01/2020    MPV 9.3 01/24/2018    GRAN 6.0 05/01/2020    GRAN 80.3 (H) 05/01/2020    LYMPH 0.7 (L) 05/01/2020    LYMPH 9.8 (L) 05/01/2020    MONO 0.6 05/01/2020    MONO 8.2 05/01/2020    BASO 0.04 05/01/2020    BASO 0.1 01/24/2018    NRBC 0 05/01/2020       CHEMISTRY & LIVER FUNCTION - LAST 2  Lab Results   Component Value Date     (L) 11/08/2021     05/01/2020    K 4.8 11/08/2021    K 4.2 05/01/2020    CL 93 (L) 11/08/2021     05/01/2020    CO2 26 11/08/2021    CO2 26 05/01/2020    ANIONGAP 9 11/08/2021    ANIONGAP 7 (L) 05/01/2020    BUN 16 11/08/2021    BUN 28 (H) 05/01/2020    CREATININE 1.0 11/08/2021    CREATININE 1.1 05/01/2020     11/08/2021     (H) 05/01/2020    CALCIUM 9.3 11/08/2021    CALCIUM 9.0 05/01/2020    ALBUMIN 4.1 04/21/2017    PROT 6.7 04/21/2017    ALKPHOS 51 04/21/2017    AST 23 04/21/2017    BILITOT 0.4 04/21/2017         CARDIAC PROFILE - LAST 2  No results found for: BNP, CPK, CPKMB, LDH, TROPONINI     COAGULATION - LAST 2  No results found for: LABPT, INR, APTT    ENDOCRINE & PSA - LAST 2  Lab Results   Component Value Date    TSH 2.98 04/21/2017        ECHOCARDIOGRAM RESULTS  No results found for this or any previous visit.      CURRENT/PREVIOUS VISIT EKG  Results for orders placed or performed in visit on 09/13/21   IN OFFICE EKG 12-LEAD (to [place])    Collection Time: 09/13/21  1:07 PM    Narrative    Test Reason : I25.10,    Vent. Rate : 056 BPM     Atrial Rate : 056 BPM     P-R Int : 170 ms          QRS Dur : 098 ms      QT Int : 392 ms       P-R-T Axes : 039 075 104 degrees     QTc Int : 378 ms    Sinus bradycardia with sinus arrhythmia  ST and T wave abnormality, consider lateral ischemia  Abnormal ECG  No previous ECGs available  Confirmed by Antonio Srivastava MD ([id number]) on 10/1/2021 5:55:17 PM    Referred By:  VB           Confirmed By:Antonio Srivastava MD     No valid procedures specified.   Results for orders placed in visit on 06/09/22    Nuclear Stress Test    Interpretation Summary    The nuclear portion of this study will be reported separately.    The EKG portion of this study is negative for ischemia.    The patient reported no chest pain during the stress test.    There were no arrhythmias during stress.  External Result Report       Narrative & Impression    Myocardial Perfusion Imaging with SPECT Gated acquisition and Ejection Fraction single day protocol     CLINICAL INFORMATION:  Chest pain.     PROCEDURE:     Lexiscan is utilized as a pharmacologic stress agent. At peak stress, 27.0 millicuries of technetium Myoview were administered intravenously.  The rest portion of the study is accomplished on the same day, utilizing 10.2 millicuries of technetium Myoview intravenously.     FINDINGS:     There is focally decreased tracer accumulation within the apex and adjacent anterior wall on stress as compared to rest  imaging suspicious for mild focal pharmacologically induced reversibility. Diminished activity within the inferior wall is more prominent on the rest portion of the examination. The distribution of activity elsewhere appears unremarkable. The chamber size is within normal limits.  There are no wall motion abnormalities and the estimated ejection fraction is 66%.     IMPRESSION:     DIMINISHED ACTIVITY WITHIN THE APEX AND ADJACENT ANTERIOR WALL ON STRESS AS COMPARED TO REST IMAGING SUSPICIOUS FOR PHARMACOLOGICALLY INDUCED REVERSIBILITY.     DIMINISHED ACTIVITY WITHIN THE INFERIOR WALL, MORE PROMINENT ON THE REST PORTION OF THE EXAMINATION.     2.  ESTIMATED EJECTION FRACTION OF 66%.     Electronically signed by:  Ilan King MD  6/9/2022 11:54 AM CDT Workstation: [id number]      Encounter    View Encounter                     PHYSICAL EXAM  CONSTITUTIONAL: Well built, well nourished in no apparent distress  NECK: no carotid bruit, no JVD  LUNGS: CTA  CHEST WALL: no tenderness  HEART: regular rate and rhythm, S1, S2 normal, no murmur, click, rub or gallop   ABDOMEN: soft, non-tender; bowel sounds normal; no masses,  no organomegaly  EXTREMITIES: Extremities normal, no edema, no calf tenderness noted  NEURO: AAO X 3    I HAVE REVIEWED :    The vital signs, nurses notes, and all the pertinent radiology and labs.        Current Outpatient Medications   Medication Instructions    amLODIPine (NORVASC) 5 mg, Oral, 2 times daily    ascorbic acid (vitamin C) (VITAMIN C) 500 mg, Oral, Daily    aspirin 81 mg, Oral, Daily    carBAMazepine (TEGRETOL) 200 mg, Oral, 3 times daily    cholecalciferol (vitamin D3) (VITAMIN D3) 400 Units, Oral, 2 times daily    cloNIDine (CATAPRES) 0.1 mg, Oral, 2 times daily PRN, Prn three times a day    clopidogreL (PLAVIX) 75 mg, Oral, Every other day    coenzyme Q10 100 mg, Oral, 2 times daily    ezetimibe (ZETIA) 10 mg, Oral, Daily    ferrous sulfate 324 mg, Oral, Daily    fish  oil-omega-3 fatty acids 300-1,000 mg capsule 1 capsule, Oral, Daily, 1000mg daily    fluticasone propionate (FLONASE) 50 mcg/actuation nasal spray 1 spray, Each Nostril, Daily, One spray each nostril daily    fosinopriL (MONOPRIL) 40 mg, Oral, Daily    isosorbide mononitrate (IMDUR) 30 mg, Oral, Daily    LACTOBAC NO.41/BIFIDOBACT NO.7 (PROBIOTIC-10 ORAL) 1 tablet, Oral, Daily    levothyroxine (SYNTHROID, LEVOTHROID) 175 mcg, Oral, Daily    linaCLOtide (LINZESS) 145 mcg, Oral, Daily PRN    MAGNESIUM CITRATE ORAL 135 mg, Oral, 2 times daily    metoprolol succinate (TOPROL-XL) 50 mg, Oral, Daily, qam    multivitamin (THERAGRAN) per tablet 1 tablet, Oral, Daily    nitroGLYCERIN (NITROSTAT) 0.4 mg, Sublingual, Every 5 min PRN, Prn chest pain    oxybutynin (DITROPAN-XL) 10 mg, Oral, Daily    potassium citrate (UROCIT-K) 10 mEq (1,080 mg) TbSR 10 mEq, Oral, 4 times daily, Takes 2 tabs bid    psyllium (METAMUCIL) packet 1 packet, Oral, Daily, prn    QUEtiapine (SEROQUEL XR) 400 mg, Oral, Daily    rosuvastatin (CRESTOR) 40 mg, Oral, Daily    temazepam (RESTORIL) 30 mg, Oral, Nightly    terazosin (HYTRIN) 1 mg, Oral, Nightly          Assessment & Plan     Coronary artery disease involving native coronary artery of native heart without angina pectoris  Coronary artery disease a relatively stable.  Recommend to continue on aspirin and Plavix 75 mg daily increase isosorbide mononitrate to 60 mg daily.  Recommend to optimize medical therapy is myocardial perfusion study suspicious area although patient prefers to defer angiographic assessment at this time if he fails medical therapy may need angiographic assessment for more definite diagnosis.    Essential hypertension  Blood pressure is relatively stable are increase Hytrin to 2 mg at bedtime and continue on isosorbide mononitrate but increase the dose of 60 mg daily and continue on amlodipine, Monopril the same doses.  Maintain on low-salt    Mixed  hyperlipidemia  Encouraged the patient to continue on Crestor 40 mg a day fish oil capsules and Zetia 10 mg daily maintain on low-fat low-cholesterol diet    Anemia  His last hemoglobin obtain April 28 is about 12.3 g hematocrit 35.6 with an MCV of 91.6.  Continue on same therapy          No follow-ups on file.

## 2022-08-12 NOTE — ASSESSMENT & PLAN NOTE
Encouraged the patient to continue on Crestor 40 mg a day fish oil capsules and Zetia 10 mg daily maintain on low-fat low-cholesterol diet

## 2022-08-12 NOTE — ASSESSMENT & PLAN NOTE
Blood pressure is relatively stable are increase Hytrin to 2 mg at bedtime and continue on isosorbide mononitrate but increase the dose of 60 mg daily and continue on amlodipine, Monopril the same doses.  Maintain on low-salt

## 2022-08-12 NOTE — ASSESSMENT & PLAN NOTE
Coronary artery disease a relatively stable.  Recommend to continue on aspirin and Plavix 75 mg daily increase isosorbide mononitrate to 60 mg daily.  Recommend to optimize medical therapy is myocardial perfusion study suspicious area although patient prefers to defer angiographic assessment at this time if he fails medical therapy may need angiographic assessment for more definite diagnosis.

## 2022-08-12 NOTE — ASSESSMENT & PLAN NOTE
His last hemoglobin obtain April 28 is about 12.3 g hematocrit 35.6 with an MCV of 91.6.  Continue on same therapy

## 2022-11-01 ENCOUNTER — HOSPITAL ENCOUNTER (OUTPATIENT)
Dept: RADIOLOGY | Facility: HOSPITAL | Age: 82
Discharge: HOME | End: 2022-11-01
Attending: SPECIALIST
Payer: MEDICARE

## 2022-11-01 DIAGNOSIS — C64.1 MALIGNANT NEOPLASM OF RIGHT KIDNEY, EXCEPT RENAL PELVIS: ICD-10-CM

## 2022-11-01 DIAGNOSIS — N20.0 URIC ACID NEPHROLITHIASIS: ICD-10-CM

## 2022-11-01 PROCEDURE — 76770 US EXAM ABDO BACK WALL COMP: CPT | Mod: TC,PO

## 2022-11-08 DIAGNOSIS — C64.1 MALIGNANT NEOPLASM OF RIGHT KIDNEY, EXCEPT RENAL PELVIS: Primary | ICD-10-CM

## 2022-11-09 ENCOUNTER — HOSPITAL ENCOUNTER (EMERGENCY)
Facility: HOSPITAL | Age: 82
Discharge: HOME | End: 2022-11-09
Attending: EMERGENCY MEDICINE
Payer: OTHER GOVERNMENT

## 2022-11-09 VITALS
HEART RATE: 76 BPM | OXYGEN SATURATION: 98 % | DIASTOLIC BLOOD PRESSURE: 87 MMHG | SYSTOLIC BLOOD PRESSURE: 186 MMHG | TEMPERATURE: 98 F | HEIGHT: 69 IN | BODY MASS INDEX: 26.96 KG/M2 | WEIGHT: 182 LBS | RESPIRATION RATE: 11 BRPM

## 2022-11-09 DIAGNOSIS — R07.9 CHEST PAIN: ICD-10-CM

## 2022-11-09 DIAGNOSIS — I10 HYPERTENSION, UNSPECIFIED TYPE: Primary | ICD-10-CM

## 2022-11-09 LAB
ALBUMIN SERPL BCP-MCNC: 4.1 G/DL (ref 3.5–5.2)
ALP SERPL-CCNC: 57 U/L (ref 55–135)
ALT SERPL W/O P-5'-P-CCNC: 21 U/L (ref 10–44)
ANION GAP SERPL CALC-SCNC: 8 MMOL/L (ref 8–16)
AST SERPL-CCNC: 24 U/L (ref 10–40)
BASOPHILS # BLD AUTO: 0.07 K/UL (ref 0–0.2)
BASOPHILS NFR BLD: 1.4 % (ref 0–1.9)
BILIRUB SERPL-MCNC: 0.4 MG/DL (ref 0.1–1)
BILIRUB UR QL STRIP: NEGATIVE
BNP SERPL-MCNC: 218 PG/ML (ref 0–99)
BUN SERPL-MCNC: 30 MG/DL (ref 8–23)
CALCIUM SERPL-MCNC: 8.9 MG/DL (ref 8.7–10.5)
CHLORIDE SERPL-SCNC: 100 MMOL/L (ref 95–110)
CLARITY UR: CLEAR
CO2 SERPL-SCNC: 24 MMOL/L (ref 23–29)
COLOR UR: YELLOW
CREAT SERPL-MCNC: 1.3 MG/DL (ref 0.5–1.4)
DIFFERENTIAL METHOD: ABNORMAL
EOSINOPHIL # BLD AUTO: 0.2 K/UL (ref 0–0.5)
EOSINOPHIL NFR BLD: 3.3 % (ref 0–8)
ERYTHROCYTE [DISTWIDTH] IN BLOOD BY AUTOMATED COUNT: 12.3 % (ref 11.5–14.5)
EST. GFR  (NO RACE VARIABLE): 54.5 ML/MIN/1.73 M^2
GLUCOSE SERPL-MCNC: 118 MG/DL (ref 70–110)
GLUCOSE UR QL STRIP: NEGATIVE
HCT VFR BLD AUTO: 33.9 % (ref 40–54)
HGB BLD-MCNC: 11.7 G/DL (ref 14–18)
HGB UR QL STRIP: NEGATIVE
IMM GRANULOCYTES # BLD AUTO: 0.02 K/UL (ref 0–0.04)
IMM GRANULOCYTES NFR BLD AUTO: 0.4 % (ref 0–0.5)
INR PPP: 1.2
KETONES UR QL STRIP: NEGATIVE
LACTATE SERPL-SCNC: 1.2 MMOL/L (ref 0.5–1.9)
LEUKOCYTE ESTERASE UR QL STRIP: NEGATIVE
LYMPHOCYTES # BLD AUTO: 1 K/UL (ref 1–4.8)
LYMPHOCYTES NFR BLD: 18.9 % (ref 18–48)
MCH RBC QN AUTO: 32 PG (ref 27–31)
MCHC RBC AUTO-ENTMCNC: 34.5 G/DL (ref 32–36)
MCV RBC AUTO: 93 FL (ref 82–98)
MONOCYTES # BLD AUTO: 0.6 K/UL (ref 0.3–1)
MONOCYTES NFR BLD: 11.3 % (ref 4–15)
NEUTROPHILS # BLD AUTO: 3.3 K/UL (ref 1.8–7.7)
NEUTROPHILS NFR BLD: 64.7 % (ref 38–73)
NITRITE UR QL STRIP: NEGATIVE
NRBC BLD-RTO: 0 /100 WBC
PH UR STRIP: 7 [PH] (ref 5–8)
PLATELET # BLD AUTO: 175 K/UL (ref 150–450)
PMV BLD AUTO: 9.5 FL (ref 9.2–12.9)
POTASSIUM SERPL-SCNC: 4.5 MMOL/L (ref 3.5–5.1)
PROT SERPL-MCNC: 6.7 G/DL (ref 6–8.4)
PROT UR QL STRIP: NEGATIVE
PROTHROMBIN TIME: 14.2 SEC (ref 11.4–13.7)
RBC # BLD AUTO: 3.66 M/UL (ref 4.6–6.2)
SARS-COV-2 RDRP RESP QL NAA+PROBE: NEGATIVE
SODIUM SERPL-SCNC: 132 MMOL/L (ref 136–145)
SP GR UR STRIP: 1.01 (ref 1–1.03)
TROPONIN I SERPL DL<=0.01 NG/ML-MCNC: <0.03 NG/ML
TSH SERPL DL<=0.005 MIU/L-ACNC: 2.11 UIU/ML (ref 0.34–5.6)
URN SPEC COLLECT METH UR: NORMAL
UROBILINOGEN UR STRIP-ACNC: NEGATIVE EU/DL
WBC # BLD AUTO: 5.14 K/UL (ref 3.9–12.7)

## 2022-11-09 PROCEDURE — 84443 ASSAY THYROID STIM HORMONE: CPT | Performed by: NURSE PRACTITIONER

## 2022-11-09 PROCEDURE — 85610 PROTHROMBIN TIME: CPT | Performed by: NURSE PRACTITIONER

## 2022-11-09 PROCEDURE — 93010 EKG 12-LEAD: ICD-10-PCS | Mod: ,,, | Performed by: INTERNAL MEDICINE

## 2022-11-09 PROCEDURE — 84484 ASSAY OF TROPONIN QUANT: CPT | Performed by: NURSE PRACTITIONER

## 2022-11-09 PROCEDURE — 25000003 PHARM REV CODE 250: Performed by: EMERGENCY MEDICINE

## 2022-11-09 PROCEDURE — 83880 ASSAY OF NATRIURETIC PEPTIDE: CPT | Performed by: NURSE PRACTITIONER

## 2022-11-09 PROCEDURE — 99284 EMERGENCY DEPT VISIT MOD MDM: CPT | Mod: 25

## 2022-11-09 PROCEDURE — 93005 ELECTROCARDIOGRAM TRACING: CPT | Performed by: INTERNAL MEDICINE

## 2022-11-09 PROCEDURE — 85025 COMPLETE CBC W/AUTO DIFF WBC: CPT | Performed by: NURSE PRACTITIONER

## 2022-11-09 PROCEDURE — 96360 HYDRATION IV INFUSION INIT: CPT

## 2022-11-09 PROCEDURE — 96361 HYDRATE IV INFUSION ADD-ON: CPT

## 2022-11-09 PROCEDURE — 80053 COMPREHEN METABOLIC PANEL: CPT | Performed by: NURSE PRACTITIONER

## 2022-11-09 PROCEDURE — 36415 COLL VENOUS BLD VENIPUNCTURE: CPT | Performed by: NURSE PRACTITIONER

## 2022-11-09 PROCEDURE — 83605 ASSAY OF LACTIC ACID: CPT | Performed by: EMERGENCY MEDICINE

## 2022-11-09 PROCEDURE — 81003 URINALYSIS AUTO W/O SCOPE: CPT | Performed by: EMERGENCY MEDICINE

## 2022-11-09 PROCEDURE — U0002 COVID-19 LAB TEST NON-CDC: HCPCS | Performed by: EMERGENCY MEDICINE

## 2022-11-09 PROCEDURE — 93010 ELECTROCARDIOGRAM REPORT: CPT | Mod: ,,, | Performed by: INTERNAL MEDICINE

## 2022-11-09 RX ORDER — EZETIMIBE 10 MG/1
10 TABLET ORAL DAILY
Status: CANCELLED | OUTPATIENT
Start: 2022-11-10

## 2022-11-09 RX ORDER — CARBAMAZEPINE 200 MG/1
200 TABLET ORAL 3 TIMES DAILY
Status: CANCELLED | OUTPATIENT
Start: 2022-11-09

## 2022-11-09 RX ORDER — NAPROXEN SODIUM 220 MG/1
81 TABLET, FILM COATED ORAL DAILY
Status: CANCELLED | OUTPATIENT
Start: 2022-11-10

## 2022-11-09 RX ORDER — ATORVASTATIN CALCIUM 20 MG/1
80 TABLET, FILM COATED ORAL NIGHTLY
Status: CANCELLED | OUTPATIENT
Start: 2022-11-09

## 2022-11-09 RX ORDER — CLOPIDOGREL BISULFATE 75 MG/1
75 TABLET ORAL EVERY OTHER DAY
Status: CANCELLED | OUTPATIENT
Start: 2022-11-10

## 2022-11-09 RX ORDER — LANOLIN ALCOHOL/MO/W.PET/CERES
1 CREAM (GRAM) TOPICAL DAILY
Status: CANCELLED | OUTPATIENT
Start: 2022-11-10

## 2022-11-09 RX ORDER — OXYBUTYNIN CHLORIDE 10 MG/1
10 TABLET, EXTENDED RELEASE ORAL DAILY
Status: CANCELLED | OUTPATIENT
Start: 2022-11-10

## 2022-11-09 RX ORDER — ISOSORBIDE MONONITRATE 30 MG/1
30 TABLET, EXTENDED RELEASE ORAL DAILY
Qty: 30 TABLET | Refills: 0 | Status: SHIPPED | OUTPATIENT
Start: 2022-11-09 | End: 2024-03-13 | Stop reason: SDUPTHER

## 2022-11-09 RX ADMIN — SODIUM CHLORIDE 1000 ML: 0.9 INJECTION, SOLUTION INTRAVENOUS at 02:11

## 2022-11-09 NOTE — ED PROVIDER NOTES
Encounter Date: 2022       History     Chief Complaint   Patient presents with    Abnormal Lab     [facility] low [facility]      82-year-old male presents with abnormal labs, [facility] and low blood pressure patient reports over the past 2 days his blood pressure has been varying significantly patient reports that this morning his blood pressure was high he took his blood pressure medication and then it dropped to 66/44 patient denies falls patient denies headache patient denies chest pain patient has no other acute complaints at this time patient has a past medical history of hypertension, prostate cancer, coronary artery disease patient reports that the blood draw that showed low sodium was performed on     Review of patient's allergies indicates:  No Known Allergies  Past Medical History:   Diagnosis Date    Basal cell carcinoma     Coronary artery disease     cardiac stents    Hypertension     Nonmelanoma skin cancer     Prostate cancer     Pulmonary embolism      Past Surgical History:   Procedure Laterality Date    FOREARM HARDWARE REMOVAL       No family history on file.  Social History     Tobacco Use    Smoking status: Former     Types: Cigarettes     Quit date: 1992     Years since quittin.1    Smokeless tobacco: Never   Substance Use Topics    Alcohol use: No    Drug use: No     Review of Systems   Constitutional:  Negative for fever.   HENT:  Negative for congestion, rhinorrhea, sore throat and trouble swallowing.    Eyes:  Negative for visual disturbance.   Respiratory:  Negative for cough, chest tightness, shortness of breath and wheezing.    Cardiovascular:  Negative for chest pain, palpitations and leg swelling.   Gastrointestinal:  Negative for abdominal distention, abdominal pain, constipation, diarrhea, nausea and vomiting.   Genitourinary:  Negative for difficulty urinating, dysuria, flank pain and frequency.    Musculoskeletal:  Negative for arthralgias, back pain, joint swelling and neck pain.   Skin:  Negative for color change and rash.   Neurological:  Negative for dizziness, syncope, speech difficulty, weakness, numbness and headaches.   All other systems reviewed and are negative.    Physical Exam     Initial Vitals [11/09/22 1239]   BP Pulse Resp Temp SpO2   (!) 83/50 76 18 97.9 °F (36.6 °C) 98 %      MAP       --         Physical Exam    Nursing note and vitals reviewed.  Constitutional: He appears well-developed and well-nourished. He is not diaphoretic. No distress.   HENT:   Head: Normocephalic and atraumatic.   Right Ear: External ear normal.   Left Ear: External ear normal.   Nose: Nose normal.   Mouth/Throat: Oropharynx is clear and moist. No oropharyngeal exudate.   Eyes: Conjunctivae and EOM are normal. Pupils are equal, round, and reactive to light. Right eye exhibits no discharge. Left eye exhibits no discharge. No scleral icterus.   Neck: Neck supple. No thyromegaly present. No tracheal deviation present. No JVD present.   Normal range of motion.  Cardiovascular:  Normal rate, regular rhythm, normal heart sounds and intact distal pulses.     Exam reveals no gallop and no friction rub.       No murmur heard.  Pulmonary/Chest: Breath sounds normal. No stridor. No respiratory distress. He has no wheezes. He has no rhonchi. He has no rales. He exhibits no tenderness.   Abdominal: Abdomen is soft. Bowel sounds are normal. He exhibits no distension and no mass. There is no abdominal tenderness. There is no rebound and no guarding.   Musculoskeletal:         General: No tenderness or edema. Normal range of motion.      Cervical back: Normal range of motion and neck supple.     Lymphadenopathy:     He has no cervical adenopathy.   Neurological: He is alert and oriented to person, place, and time. He has normal strength. No cranial nerve deficit or sensory deficit.   Skin: Skin is warm and dry. No rash noted. No  erythema.       ED Course   Procedures  Labs Reviewed   CBC W/ AUTO DIFFERENTIAL - Abnormal; Notable for the following components:       Result Value    RBC 3.66 (*)     Hemoglobin 11.7 (*)     Hematocrit 33.9 (*)     MCH 32.0 (*)     All other components within normal limits   COMPREHENSIVE METABOLIC PANEL - Abnormal; Notable for the following components:    Sodium 132 (*)     Glucose 118 (*)     BUN 30 (*)     eGFR 54.5 (*)     All other components within normal limits   B-TYPE NATRIURETIC PEPTIDE - Abnormal; Notable for the following components:     (*)     All other components within normal limits   PROTIME-INR - Abnormal; Notable for the following components:    PT 14.2 (*)     All other components within normal limits   TROPONIN I   SARS-COV-2 RNA AMPLIFICATION, QUAL   LACTIC ACID, PLASMA   URINALYSIS, REFLEX TO URINE CULTURE    Narrative:     Specimen Source->Urine   TSH        ECG Results              EKG 12-lead (In process)  Result time 11/09/22 14:38:01      In process by Interface, Lab In [facility] (11/09/22 14:38:01)                   Narrative:    Test Reason : R07.9,    Vent. Rate : 073 BPM     Atrial Rate : 073 BPM     P-R Int : 194 ms          QRS Dur : 098 ms      QT Int : 364 ms       P-R-T Axes : 053 077 069 degrees     QTc Int : 401 ms    Normal sinus rhythm  Nonspecific ST and T wave abnormality  Abnormal ECG  When compared with ECG of 12-AUG-2022 10:01,  Nonspecific T wave abnormality no longer evident in Inferior leads  Nonspecific T wave abnormality has replaced inverted T waves in Lateral  leads    Referred By: [email or web address]   SELF           Confirmed By:                                   Imaging Results              X-Ray Chest AP Portable (Final result)  Result time 11/09/22 13:06:41      Final result by Lor Prado MD (11/09/22 13:06:41)                   Narrative:    XR CHEST 1 VIEW    CLINICAL HISTORY:  82 years Male abnormal lab    COMPARISON: None    FINDINGS:  Cardiomediastinal silhouette is within normal limits. Lungs are normally expanded with no airspace consolidation. No pleural effusion or pneumothorax. No acute osseous abnormality.    IMPRESSION: No acute pulmonary process.    Electronically signed by:  Lor Prado MD  11/9/2022 1:06 PM [facility] Workstation: [id number]                                     Medications   sodium chloride 0.9% bolus 1,000 mL (0 mLs Intravenous Stopped 11/9/22 7484)     Medical Decision Making:   History:   Old Medical Records: I decided to obtain old medical records.  Initial Assessment:   Emergent evaluation of an 82-year-old male presenting with low blood pressure and low sodium differential diagnosis is broad but includes electrolyte abnormality, dehydration, endocrine dysfunction, infection          Attending Attestation:             Attending ED Notes:   Attempted to contact patient's cardiologist starting at 3:00 p.m. but was unable to do so, cardiologist on-call did not return page.  Patient consulted Internal Medicine for admission and adjustment of his blood pressure medication given significant swings in blood pressure however patient then changed his mind about admission and reports that he would rather go home.  Patient noted to have elevated blood pressure here on discharge patient advised to take his regular blood pressure regimen when he gets home.  Patient is to try to avoid taking clonidine as I believe it is causing the big swings in his blood pressure and rebound hypertension when it wears off patient is to check his blood pressure in morning and if it is elevated he is to take his normal blood pressure medicine except for half of the usual dose of isosorbide which is his previous dose that he was taking before the medication was changed.  Patient is to contact his cardiologist tomorrow as well as PCP so they may coordinate his blood pressure regimen.  Patient agrees that should he experience low blood pressure again  he will return to the emergency department immediately.  Patient has elected to sign out of the hospital against medical advice patient understands that by doing so there is a risk of death disability and deterioration patient understands that he can return at any time and that he is highly encouraged to do so patient shows no clinical signs of intoxication at this time.  Patient reports he has the capability to return should he decide to.    I had a detailed discussion with the patient and/or guardian regarding: The historical points, exam findings, and diagnostic results supporting the discharge diagnosis, lab results, pertinent radiology results, and the need for outpatient follow-up, for definitive care with a family practitioner and to return to the emergency department if symptoms worsen or persist or if there are any questions or concerns that arise at home. All questions have been answered in detail. Strict return to Emergency Department precautions have been provided.     A dictation software program was used for this note.  Please expect some simple typographical  errors in this note.     This patient was seen during the context of the Covid 19 global pandemic where local, state, hospital guidelines, were followed to the best of ability given the circumstances of the pandemic.                       Clinical Impression:   Final diagnoses:  [R07.9] Chest pain  [I10] Hypertension, unspecified type (Primary)        ED Disposition Condition    AMA Stable                Cleveland Rodriguez MD  11/09/22 2051

## 2022-11-10 ENCOUNTER — TELEPHONE (OUTPATIENT)
Dept: CARDIOLOGY | Facility: CLINIC | Age: 82
End: 2022-11-10
Payer: MEDICARE

## 2022-11-10 NOTE — TELEPHONE ENCOUNTER
----- Message from Connor Kyle sent at 11/10/2022  1:07 PM CST -----  Contact: pt at [phone]  Type: Needs Medical Advice  Who Called:  PT   Best Call Back Number: [phone]    Additional Information: pt called in saying he was in the ER for a while and they want tejas to follow up with the Doctor please call back to see about getting him schedule  to see Dr. Srivastava

## 2022-11-11 NOTE — TELEPHONE ENCOUNTER
Patient was given appt with [facility] for 12/14 at 11am for hospital fu. Pt verbally understood and would take appt.

## 2022-12-19 ENCOUNTER — TELEPHONE (OUTPATIENT)
Dept: CARDIOLOGY | Facility: CLINIC | Age: 82
End: 2022-12-19
Payer: MEDICARE

## 2022-12-19 NOTE — TELEPHONE ENCOUNTER
----- Message from To Rosen sent at 12/19/2022 10:50 AM CST -----  Type:  Sooner Appointment Request    Caller is requesting a sooner appointment.  Caller declined first available appointment listed below.  Caller will not accept being placed on the waitlist and is requesting a message be sent to doctor.    Name of Caller:  pt wife, Kenyatta  When is the first available appointment?  none  Symptoms:  6 month f/u--please call and advise  Best Call Back Number:  [phone] (home)     Additional Information:  thank you

## 2023-02-23 ENCOUNTER — OFFICE VISIT (OUTPATIENT)
Dept: CARDIOLOGY | Facility: CLINIC | Age: 83
End: 2023-02-23
Payer: MEDICARE

## 2023-02-23 VITALS
OXYGEN SATURATION: 98 % | WEIGHT: 183 LBS | DIASTOLIC BLOOD PRESSURE: 64 MMHG | HEART RATE: 69 BPM | RESPIRATION RATE: 16 BRPM | HEIGHT: 69 IN | SYSTOLIC BLOOD PRESSURE: 118 MMHG | BODY MASS INDEX: 27.11 KG/M2

## 2023-02-23 DIAGNOSIS — I10 ESSENTIAL HYPERTENSION: ICD-10-CM

## 2023-02-23 DIAGNOSIS — K59.09 CHRONIC CONSTIPATION: ICD-10-CM

## 2023-02-23 DIAGNOSIS — E07.9 THYROID DYSFUNCTION: ICD-10-CM

## 2023-02-23 DIAGNOSIS — I25.10 CORONARY ARTERY DISEASE INVOLVING NATIVE CORONARY ARTERY OF NATIVE HEART WITHOUT ANGINA PECTORIS: ICD-10-CM

## 2023-02-23 PROCEDURE — 99999 PR PBB SHADOW E&M-EST. PATIENT-LVL III: CPT | Mod: PBBFAC,,, | Performed by: INTERNAL MEDICINE

## 2023-02-23 PROCEDURE — 99999 PR PBB SHADOW E&M-EST. PATIENT-LVL III: ICD-10-PCS | Mod: PBBFAC,,, | Performed by: INTERNAL MEDICINE

## 2023-02-23 PROCEDURE — 99215 OFFICE O/P EST HI 40 MIN: CPT | Mod: S$PBB,,, | Performed by: INTERNAL MEDICINE

## 2023-02-23 PROCEDURE — 99215 PR OFFICE/OUTPT VISIT, EST, LEVL V, 40-54 MIN: ICD-10-PCS | Mod: S$PBB,,, | Performed by: INTERNAL MEDICINE

## 2023-02-23 PROCEDURE — 99213 OFFICE O/P EST LOW 20 MIN: CPT | Mod: PBBFAC,PN | Performed by: INTERNAL MEDICINE

## 2023-02-23 RX ORDER — TERAZOSIN 5 MG/1
5 CAPSULE ORAL NIGHTLY
Qty: 90 CAPSULE | Refills: 3 | Status: SHIPPED | OUTPATIENT
Start: 2023-02-23 | End: 2024-01-31

## 2023-02-23 NOTE — ASSESSMENT & PLAN NOTE
Continue on Plavix 75 mg daily, continue on Crestor 40 mg a day continue on baby aspirin 81 mg daily along with risk factor modification with low-fat low-cholesterol and also to continue on isosorbide mononitrate and as mentioned above along with metoprolol daily

## 2023-02-23 NOTE — ASSESSMENT & PLAN NOTE
Patient has essential hypertension with significant labile fluctuation.  Recommend the following  1. Maintain on amlodipine is taking 5 mg twice a day continue the same   2. Continue on fosinopril 40 mg daily  3. Continue on isosorbide mononitrate 30 mg daily  4. Continue on Toprol-XL 50 mg daily at bedtime   5. Increase the terazosin to 5 mg at bedtime   If the blood pressure becomes low will hold fosinopril.  7. Clonidine 0.1 mg only for p.r.n. basis with a sustained blood pressure greater than 170

## 2023-02-23 NOTE — PROGRESS NOTES
Subjective:    Patient ID:  Michael Hawkins is a 83 y.o. male patient here for evaluation Hypertension (He has noticed his pressure going up and down) and Shortness of Breath      History of Present Illness:     Is an 83-year-old gentleman who has history of labile hypertension has kept a record of multiple blood pressure recordings during the day and there is significant fluctuations all day long.  Arm he had an episode of hyponatremia after drinking 32 oz of water for a bladder and kidney scan and with a sodium of 122 he was reportedly hypotensive and felt very weak as well but continued monitoring had spontaneous resolution of his blood pressure and since then his sodium levels have also improved.  Currently he denies having chest discomfort no arm neck or jaw pain on occasions his blood pressure shoots up higher over 200 and he has some vague sensation in his right upper arm and right leg feels swollen.  Denies having exertional chest discomfort no significant shortness of breath no swelling in the lower extremities.  No cough or congestion and no fevers or chills  Scant sputum production in the morning noted.    Review of patient's allergies indicates:  No Known Allergies    Past Medical History:   Diagnosis Date    Basal cell carcinoma     Coronary artery disease     cardiac stents    Hypertension     Nonmelanoma skin cancer     Prostate cancer     Pulmonary embolism      Past Surgical History:   Procedure Laterality Date    FOREARM HARDWARE REMOVAL       Social History     Tobacco Use    Smoking status: Former     Types: Cigarettes     Quit date: 1992     Years since quittin.4    Smokeless tobacco: Never   Substance Use Topics    Alcohol use: No    Drug use: No        Review of Systems:    As noted in HPI in addition      REVIEW OF SYSTEMS  CARDIOVASCULAR: No recent chest pain, palpitations, arm, neck, or jaw pain  RESPIRATORY: No recent fever, cough chills, SOB or congestion  : No blood in  the urine  GI: No Nausea, vomiting, constipation, diarrhea, blood, or reflux.  MUSCULOSKELETAL: No myalgias  NEURO: No lightheadedness or dizziness  EYES: No Double vision, blurry, vision or he has some fullness in the head without headaches.         Objective        Vitals:    02/23/23 0922   BP: 118/64   Pulse: 69   Resp: 16       LIPIDS - LAST 2   No results found for: CHOL, HDL, LDLCALC, TRIG, CHOLHDL    CBC - LAST 2  Lab Results   Component Value Date    WBC 5.14 11/09/2022    WBC 7.45 05/01/2020    RBC 3.66 (L) 11/09/2022    RBC 3.63 (L) 05/01/2020    HGB 11.7 (L) 11/09/2022    HGB 11.3 (L) 05/01/2020    HCT 33.9 (L) 11/09/2022    HCT 34.4 (L) 05/01/2020    MCV 93 11/09/2022    MCV 95 05/01/2020    MCH 32.0 (H) 11/09/2022    MCH 31.1 (H) 05/01/2020    MCHC 34.5 11/09/2022    MCHC 32.8 05/01/2020    RDW 12.3 11/09/2022    RDW 13.5 05/01/2020     11/09/2022     05/01/2020    MPV 9.5 11/09/2022    MPV 9.8 05/01/2020    GRAN 3.3 11/09/2022    GRAN 64.7 11/09/2022    LYMPH 1.0 11/09/2022    LYMPH 18.9 11/09/2022    MONO 0.6 11/09/2022    MONO 11.3 11/09/2022    BASO 0.07 11/09/2022    BASO 0.04 05/01/2020    NRBC 0 11/09/2022    NRBC 0 05/01/2020       CHEMISTRY & LIVER FUNCTION - LAST 2  Lab Results   Component Value Date     (L) 11/09/2022     (LL) 11/01/2022    K 4.5 11/09/2022    K 4.2 11/01/2022     11/09/2022    CL 90 (L) 11/01/2022    CO2 24 11/09/2022    CO2 25 11/01/2022    ANIONGAP 8 11/09/2022    ANIONGAP 7 (L) 11/01/2022    BUN 30 (H) 11/09/2022    BUN 20 11/01/2022    CREATININE 1.3 11/09/2022    CREATININE 1.0 11/01/2022     (H) 11/09/2022    GLU 98 11/01/2022    CALCIUM 8.9 11/09/2022    CALCIUM 8.6 (L) 11/01/2022    ALBUMIN 4.1 11/09/2022    ALBUMIN 4.1 04/21/2017    PROT 6.7 11/09/2022    PROT 6.7 04/21/2017    ALKPHOS 57 11/09/2022    ALKPHOS 51 04/21/2017    ALT 21 11/09/2022    AST 24 11/09/2022    AST 23 04/21/2017    BILITOT 0.4 11/09/2022    BILITOT 0.4  04/21/2017        CARDIAC PROFILE - LAST 2  Lab Results   Component Value Date     (H) 11/09/2022    TROPONINI <0.030 11/09/2022        COAGULATION - LAST 2  Lab Results   Component Value Date    LABPT 14.2 (H) 11/09/2022    INR 1.2 11/09/2022       ENDOCRINE & PSA - LAST 2  Lab Results   Component Value Date    TSH 2.110 11/09/2022    TSH 2.98 04/21/2017        ECHOCARDIOGRAM RESULTS  No results found for this or any previous visit.      CURRENT/PREVIOUS VISIT EKG  Results for orders placed or performed during the hospital encounter of 11/09/22   EKG 12-lead    Collection Time: 11/09/22  2:32 PM    Narrative    Test Reason : R07.9,    Vent. Rate : 073 BPM     Atrial Rate : 073 BPM     P-R Int : 194 ms          QRS Dur : 098 ms      QT Int : 364 ms       P-R-T Axes : 053 077 069 degrees     QTc Int : 401 ms    Normal sinus rhythm  Nonspecific ST and T wave abnormality  Abnormal ECG  When compared with ECG of 12-AUG-2022 10:01,  Nonspecific T wave abnormality no longer evident in Inferior leads  Nonspecific T wave abnormality has replaced inverted T waves in Lateral  leads  Confirmed by Lan Srivastava MD ([id number]) on 11/17/2022 6:54:45 PM    Referred By: VERA   SELF           Confirmed By:Lan Srivastava MD     No valid procedures specified.   Results for orders placed in visit on 06/09/22    Nuclear Stress Test    Interpretation Summary    The nuclear portion of this study will be reported separately.    The EKG portion of this study is negative for ischemia.    The patient reported no chest pain during the stress test.    There were no arrhythmias during stress.    No valid procedures specified.    PHYSICAL EXAM  CONSTITUTIONAL: Well built, well nourished in no apparent distress  NECK:  Right carotid bruit no JVD  LUNGS: CTA  CHEST WALL: no tenderness  HEART: regular rate and rhythm, S1, S2 normal, no murmur, click, rub or gallop   ABDOMEN: soft, no abdominal bruits, non-tender; bowel sounds normal; no  masses,  no organomegaly  EXTREMITIES: Extremities normal, no edema, no calf tenderness noted  NEURO: AAO X 3    I HAVE REVIEWED :    The vital signs, nurses notes, and all the pertinent radiology and labs.        Current Outpatient Medications   Medication Instructions    amLODIPine (NORVASC) 5 mg, Oral, 2 times daily    ascorbic acid (vitamin C) (VITAMIN C) 250 mg, Oral, Daily    aspirin 81 mg, Oral, Daily    carBAMazepine (TEGRETOL) 200 mg, Oral, 3 times daily    cholecalciferol (vitamin D3) (VITAMIN D3) 400 Units, Oral, 2 times daily    cloNIDine (CATAPRES) 0.1 mg, Oral, 2 times daily PRN, Prn three times a day    clopidogreL (PLAVIX) 75 mg tablet TAKE 1 TABLET EVERY OTHER DAY    coenzyme Q10 100 mg, Oral, 2 times daily    ezetimibe (ZETIA) 10 mg tablet TAKE 1 TABLET DAILY    ferrous sulfate 324 mg, Oral, Daily    fish oil-omega-3 fatty acids 300-1,000 mg capsule 1 capsule, Oral, Daily, 1000mg daily    fluticasone propionate (FLONASE) 50 mcg/actuation nasal spray 1 spray, Each Nostril, Daily, One spray each nostril daily    fosinopriL (MONOPRIL) 40 mg, Oral, Daily    isosorbide mononitrate (IMDUR) 30 mg, Oral, Daily    LACTOBAC NO.41/BIFIDOBACT NO.7 (PROBIOTIC-10 ORAL) 1 tablet, Oral, Daily    levothyroxine (SYNTHROID, LEVOTHROID) 175 mcg, Oral, Daily    linaCLOtide (LINZESS) 145 mcg, Oral, Daily PRN    MAGNESIUM CITRATE ORAL 135 mg, Oral, 2 times daily    metoprolol succinate (TOPROL-XL) 50 mg, Oral, Daily, qam    multivitamin (THERAGRAN) per tablet 1 tablet, Oral, Daily    nitroGLYCERIN (NITROSTAT) 0.4 mg, Sublingual, Every 5 min PRN, Prn chest pain    oxybutynin (DITROPAN-XL) 10 mg, Oral, Daily    potassium citrate (UROCIT-K) 10 mEq (1,080 mg) TbSR 20 mEq, Oral, 2 times daily, Takes 2 tabs bid, send to [facility]    psyllium (METAMUCIL) packet 1 packet, Oral, Daily, 1 tsp daily    QUEtiapine (SEROQUEL XR) 400 mg, Oral, Daily    rosuvastatin (CRESTOR) 40 MG Tab TAKE 1 TABLET DAILY    temazepam (RESTORIL)  30 mg, Oral, Nightly    terazosin (HYTRIN) 2 mg, Oral, Nightly          Assessment & Plan     Essential hypertension  Patient has essential hypertension with significant labile fluctuation.  Recommend the following  1. Maintain on amlodipine is taking 5 mg twice a day continue the same   2. Continue on fosinopril 40 mg daily  3. Continue on isosorbide mononitrate 30 mg daily  4. Continue on Toprol-XL 50 mg daily at bedtime   5. Increase the terazosin to 5 mg at bedtime   If the blood pressure becomes low will hold fosinopril.  7. Clonidine 0.1 mg only for p.r.n. basis with a sustained blood pressure greater than 170    Coronary artery disease involving native coronary artery of native heart without angina pectoris  Continue on Plavix 75 mg daily, continue on Crestor 40 mg a day continue on baby aspirin 81 mg daily along with risk factor modification with low-fat low-cholesterol and also to continue on isosorbide mononitrate and as mentioned above along with metoprolol daily    Thyroid dysfunction  Continue on levothyroxine 175 mcg a day last TSH level was    Chronic constipation  He has been advised to take Linzess I would encourage him to use    I have reviewed all his medications went over the changes to be made in his daily regimen and maintain on low-salt diet and daily physical activity for at least 20-30 minutes of walking will monitor his blood pressure 4 times a week after 5 minutes of comfortable seating and I will see him back in follow-up in 3 months to make further changes as necessary      Follow up in about 3 months (around 5/23/2023).

## 2023-05-21 ENCOUNTER — OFFICE VISIT (OUTPATIENT)
Dept: URGENT CARE | Facility: CLINIC | Age: 83
End: 2023-05-21
Payer: MEDICARE

## 2023-05-21 ENCOUNTER — TELEPHONE (OUTPATIENT)
Dept: URGENT CARE | Facility: CLINIC | Age: 83
End: 2023-05-21

## 2023-05-21 VITALS
SYSTOLIC BLOOD PRESSURE: 110 MMHG | BODY MASS INDEX: 25.92 KG/M2 | TEMPERATURE: 98 F | HEART RATE: 68 BPM | RESPIRATION RATE: 18 BRPM | WEIGHT: 175 LBS | DIASTOLIC BLOOD PRESSURE: 60 MMHG | HEIGHT: 69 IN | OXYGEN SATURATION: 97 %

## 2023-05-21 DIAGNOSIS — M79.672 LEFT FOOT PAIN: ICD-10-CM

## 2023-05-21 DIAGNOSIS — M72.2 PLANTAR FASCIITIS, LEFT: Primary | ICD-10-CM

## 2023-05-21 DIAGNOSIS — M79.89 SWELLING OF LEFT FOOT: ICD-10-CM

## 2023-05-21 PROCEDURE — 96372 PR INJECTION,THERAP/PROPH/DIAG2ST, IM OR SUBCUT: ICD-10-PCS | Mod: S$GLB,,, | Performed by: NURSE PRACTITIONER

## 2023-05-21 PROCEDURE — 99204 OFFICE O/P NEW MOD 45 MIN: CPT | Mod: 25,S$GLB,, | Performed by: NURSE PRACTITIONER

## 2023-05-21 PROCEDURE — 96372 THER/PROPH/DIAG INJ SC/IM: CPT | Mod: S$GLB,,, | Performed by: NURSE PRACTITIONER

## 2023-05-21 PROCEDURE — 99204 PR OFFICE/OUTPT VISIT, NEW, LEVL IV, 45-59 MIN: ICD-10-PCS | Mod: 25,S$GLB,, | Performed by: NURSE PRACTITIONER

## 2023-05-21 RX ORDER — MELOXICAM 15 MG/1
15 TABLET ORAL DAILY
Qty: 14 TABLET | Refills: 0 | Status: SHIPPED | OUTPATIENT
Start: 2023-05-22 | End: 2023-06-05

## 2023-05-21 RX ORDER — KETOROLAC TROMETHAMINE 30 MG/ML
30 INJECTION, SOLUTION INTRAMUSCULAR; INTRAVENOUS
Status: COMPLETED | OUTPATIENT
Start: 2023-05-21 | End: 2023-05-21

## 2023-05-21 RX ORDER — DEXAMETHASONE SODIUM PHOSPHATE 4 MG/ML
8 INJECTION, SOLUTION INTRA-ARTICULAR; INTRALESIONAL; INTRAMUSCULAR; INTRAVENOUS; SOFT TISSUE ONCE
Status: COMPLETED | OUTPATIENT
Start: 2023-05-21 | End: 2023-05-21

## 2023-05-21 RX ORDER — BETAMETHASONE DIPROPIONATE 0.5 MG/G
SPRAY TOPICAL
COMMUNITY

## 2023-05-21 RX ORDER — AMLODIPINE BESYLATE 100 %
10 POWDER (GRAM) MISCELLANEOUS
COMMUNITY
End: 2024-03-13

## 2023-05-21 RX ADMIN — KETOROLAC TROMETHAMINE 30 MG: 30 INJECTION, SOLUTION INTRAMUSCULAR; INTRAVENOUS at 11:05

## 2023-05-21 RX ADMIN — DEXAMETHASONE SODIUM PHOSPHATE 8 MG: 4 INJECTION, SOLUTION INTRA-ARTICULAR; INTRALESIONAL; INTRAMUSCULAR; INTRAVENOUS; SOFT TISSUE at 11:05

## 2023-05-21 NOTE — PROGRESS NOTES
"Subjective:      Patient ID: Michael Hawkins is a 83 y.o. male.    Vitals:  height is 5' 9" (1.753 m) and weight is 79.4 kg (175 lb). His temperature is 97.6 °F (36.4 °C). His blood pressure is 110/60 and his pulse is 68. His respiration is 18 and oxygen saturation is 97%.     Chief Complaint: Ankle Injury    Michael Hawkins presents to clinic with left foot pain.  Patient reports that he [place] when he stepped on the metal plate that runs across the bottom of the door he immediately experienced a sharp pain in the bottom of his foot that has not gone away.  Patient denies any radiation of the pain.  Patient states that he did not fall or twist his ankle he simply stumbled.  Patient would like an x-ray completed.  X-rays unavailable in clinic at this time.    Ankle Injury   The incident occurred 12 to 24 hours ago. The injury mechanism was a twisting injury. The pain is present in the left ankle. The pain is at a severity of 3/10. The pain is mild. Associated symptoms include an inability to bear weight and numbness. He reports no foreign bodies present. The symptoms are aggravated by weight bearing and movement. He has tried rest, heat and ice for the symptoms. The treatment provided mild relief.     Constitution: Negative.   HENT: Negative.     Neck: neck negative.   Cardiovascular: Negative.    Eyes: Negative.    Respiratory: Negative.     Gastrointestinal: Negative.    Endocrine: negative.   Genitourinary: Negative.    Musculoskeletal:  Positive for pain and pain with walking.   Skin: Negative.    Neurological:  Positive for numbness.    Objective:     Physical Exam   Constitutional: He is oriented to person, place, and time. He appears well-developed. He is cooperative.  Non-toxic appearance. He does not appear ill. No distress.   HENT:   Head: Normocephalic and atraumatic.   Ears:   Right Ear: Hearing, tympanic membrane, external ear and ear canal normal.   Left " Ear: Hearing, tympanic membrane, external ear and ear canal normal.   Nose: Nose normal. No mucosal edema, rhinorrhea or nasal deformity. No epistaxis. Right sinus exhibits no maxillary sinus tenderness and no frontal sinus tenderness. Left sinus exhibits no maxillary sinus tenderness and no frontal sinus tenderness.   Mouth/Throat: Uvula is midline, oropharynx is clear and moist and mucous membranes are normal. No trismus in the jaw. Normal dentition. No uvula swelling. No posterior oropharyngeal erythema.   Eyes: Conjunctivae and lids are normal. Right eye exhibits no discharge. Left eye exhibits no discharge. No scleral icterus.   Neck: Trachea normal and phonation normal. Neck supple.   Cardiovascular: Normal rate, regular rhythm, normal heart sounds and normal pulses.   Pulmonary/Chest: Effort normal and breath sounds normal. No respiratory distress.   Abdominal: Normal appearance and bowel sounds are normal. He exhibits no distension and no mass. Soft. There is no abdominal tenderness.   Musculoskeletal:         General: No deformity.        Legs:       Left foot: Decreased range of motion. Tenderness, bony tenderness and swelling (mild) present.   Neurological: He is alert and oriented to person, place, and time. He exhibits normal muscle tone. Coordination normal.   Skin: Skin is warm, dry, intact, not diaphoretic and not pale.   Psychiatric: His speech is normal and behavior is normal. Judgment and thought content normal.   Nursing note and vitals reviewed.    Assessment:     1. Plantar fasciitis, left    2. Left foot pain    3. Swelling of left foot        Plan:       Plantar fasciitis, left  -     dexAMETHasone injection 8 mg  -     ketorolac injection 30 mg  -     meloxicam (MOBIC) 15 MG tablet; Take 1 tablet (15 mg total) by mouth once daily. for 14 days  Dispense: 14 tablet; Refill: 0  -     X-Ray Foot Complete 3 view Left; Future; Expected date: 05/21/2023    Left foot pain  -     dexAMETHasone  injection 8 mg  -     ketorolac injection 30 mg  -     meloxicam (MOBIC) 15 MG tablet; Take 1 tablet (15 mg total) by mouth once daily. for 14 days  Dispense: 14 tablet; Refill: 0  -     X-Ray Foot Complete 3 view Left; Future; Expected date: 05/21/2023    Swelling of left foot  -     dexAMETHasone injection 8 mg  -     ketorolac injection 30 mg  -     meloxicam (MOBIC) 15 MG tablet; Take 1 tablet (15 mg total) by mouth once daily. for 14 days  Dispense: 14 tablet; Refill: 0  -     X-Ray Foot Complete 3 view Left; Future; Expected date: 05/21/2023

## 2023-08-07 RX ORDER — ROSUVASTATIN CALCIUM 40 MG/1
TABLET, COATED ORAL
Qty: 90 TABLET | Refills: 3 | Status: SHIPPED | OUTPATIENT
Start: 2023-08-07 | End: 2023-08-08 | Stop reason: SDUPTHER

## 2023-08-07 NOTE — TELEPHONE ENCOUNTER
----- Message from To Rosen sent at 8/7/2023  8:53 AM CDT -----  Type:  RX Refill Request    Who Called:  pt  Refill or New Rx:  refill  RX Name and Strength:  rosuvastatin (CRESTOR) 40 MG Tab  How is the patient currently taking it? (ex. 1XDay):  as directed  Is this a 30 day or 90 day RX:  90  Preferred Pharmacy with phone number:    [facility] - [address] [address]  Phone: [phone] Fax: [phone]  Local or Mail Order:  mail  Ordering Provider:  Allie Nash Call Back Number:  [phone] (home)     Additional Information:  thank you

## 2023-08-08 RX ORDER — ROSUVASTATIN CALCIUM 40 MG/1
TABLET, COATED ORAL
Qty: 90 TABLET | Refills: 3 | Status: SHIPPED | OUTPATIENT
Start: 2023-08-08 | End: 2023-08-10 | Stop reason: SDUPTHER

## 2023-08-08 NOTE — TELEPHONE ENCOUNTER
----- Message from Nii Jimenez sent at 8/8/2023  2:17 PM CDT -----  Contact: Self  Type:  Pharmacy Calling to Clarify an RX    Name of Caller:  Patient  Pharmacy Name:      [facility] - [place], [place] - [address] [place] [address]  Phone: [phone] Fax: [phone]      Prescription Name:  rosuvastatin (CRESTOR) 40 MG Tab  What do they need to clarify?:  Directions  Best Call Back Number:  [phone]   Additional Information:   States [facility] him script is missing directions. States he is out currently. Please advise.

## 2023-08-10 RX ORDER — ROSUVASTATIN CALCIUM 40 MG/1
TABLET, COATED ORAL
Qty: 90 TABLET | Refills: 3 | Status: SHIPPED | OUTPATIENT
Start: 2023-08-10

## 2023-08-10 NOTE — TELEPHONE ENCOUNTER
----- Message from To Rosen sent at 8/10/2023  1:07 PM CDT -----  Type:  RX Refill Request    Who Called:  pt  Refill or New Rx:  refill  RX Name and Strength:  rosuvastatin (CRESTOR) 40 MG Tab  How is the patient currently taking it? (ex. 1XDay):  as directed  Is this a 30 day or 90 day RX:  90  Preferred Pharmacy with phone number:    [facility] - [address] [address]  Phone: [phone] Fax: [phone]      Local or Mail Order:  mail  Ordering Provider:  Allie Nash Call Back Number:  [phone] (home)     Additional Information:  pt said he need to speak to the nurse said [facility] time to see if he can get in touch with the office to have this med filled--said he need it taken care of he out of his meds--please call and advise--thank you

## 2023-08-14 ENCOUNTER — TELEPHONE (OUTPATIENT)
Dept: CARDIOLOGY | Facility: CLINIC | Age: 83
End: 2023-08-14
Payer: MEDICARE

## 2023-08-14 NOTE — TELEPHONE ENCOUNTER
Called and spoke with Allie at [facility], she corrected the order. There was a request with no instructions. She will get the med sent off to the patient.

## 2023-08-14 NOTE — TELEPHONE ENCOUNTER
----- Message from Carmen Navarro, Patient Care Assistant sent at 8/14/2023 12:32 PM CDT -----  Regarding: advice  Contact: [facility]ev[facility] with [facility]  Type: Needs Medical Advice    Who Called:  [facility]    Pharmacy name and phone #:    [facility] - [place], [place] - [address] [place] [address]  Phone: [phone] Fax: [phone]    Best Call Back Number:  reference # [id number]    Additional Information: [facility] with [facility] states she would like a callback regarding directions for rosuvastatin (CRESTOR) 40 MG Tab. Please call to advise. Thanks!

## 2023-11-02 ENCOUNTER — HOSPITAL ENCOUNTER (OUTPATIENT)
Dept: RADIOLOGY | Facility: HOSPITAL | Age: 83
Discharge: HOME | End: 2023-11-02
Attending: SPECIALIST
Payer: MEDICARE

## 2023-11-02 DIAGNOSIS — C64.1 MALIGNANT NEOPLASM OF RIGHT KIDNEY, EXCEPT RENAL PELVIS: ICD-10-CM

## 2023-11-02 DIAGNOSIS — C64.1 MALIGNANT NEOPLASM OF RIGHT KIDNEY, EXCEPT RENAL PELVIS: Primary | ICD-10-CM

## 2023-11-02 DIAGNOSIS — C61 MALIGNANT NEOPLASM OF PROSTATE: ICD-10-CM

## 2023-11-02 PROCEDURE — 76770 US EXAM ABDO BACK WALL COMP: CPT | Mod: TC,PO

## 2023-11-14 DIAGNOSIS — N20.0 URIC ACID NEPHROLITHIASIS: ICD-10-CM

## 2023-11-14 DIAGNOSIS — C64.1 MALIGNANT NEOPLASM OF RIGHT KIDNEY, EXCEPT RENAL PELVIS: Primary | ICD-10-CM

## 2023-11-16 RX ORDER — EZETIMIBE 10 MG/1
TABLET ORAL
Qty: 90 TABLET | Refills: 3 | Status: SHIPPED | OUTPATIENT
Start: 2023-11-16

## 2023-12-01 ENCOUNTER — HOSPITAL ENCOUNTER (OUTPATIENT)
Dept: RADIOLOGY | Facility: HOSPITAL | Age: 83
Discharge: HOME | End: 2023-12-01
Attending: SPECIALIST
Payer: MEDICARE

## 2023-12-01 DIAGNOSIS — C64.1 MALIGNANT NEOPLASM OF RIGHT KIDNEY, EXCEPT RENAL PELVIS: ICD-10-CM

## 2023-12-01 DIAGNOSIS — N20.0 URIC ACID NEPHROLITHIASIS: ICD-10-CM

## 2023-12-01 LAB
CREAT SERPL-MCNC: 1.1 MG/DL (ref 0.5–1.4)
SAMPLE: NORMAL

## 2023-12-01 PROCEDURE — 74178 CT ABD&PLV WO CNTR FLWD CNTR: CPT | Mod: TC,PO

## 2023-12-01 PROCEDURE — 25500020 PHARM REV CODE 255: Mod: PO | Performed by: SPECIALIST

## 2023-12-01 RX ADMIN — IOHEXOL 100 ML: 350 INJECTION, SOLUTION INTRAVENOUS at 10:12

## 2023-12-04 ENCOUNTER — HOSPITAL ENCOUNTER (OUTPATIENT)
Dept: RADIOLOGY | Facility: HOSPITAL | Age: 83
Discharge: HOME | End: 2023-12-04
Attending: SPECIALIST
Payer: MEDICARE

## 2023-12-04 PROCEDURE — 74018 RADEX ABDOMEN 1 VIEW: CPT | Mod: TC,PO

## 2024-01-18 ENCOUNTER — TELEPHONE (OUTPATIENT)
Dept: CARDIOLOGY | Facility: CLINIC | Age: 84
End: 2024-01-18
Payer: MEDICARE

## 2024-01-18 NOTE — TELEPHONE ENCOUNTER
----- Message from Doug Morel sent at 1/18/2024  2:26 PM CST -----  Regarding: return call  Contact: MD Mane office  Type:  Patient Returning Call    Who Called:MD Edilma Luna office  Who Left Message for Patient:office nurse  Does the patient know what this is regarding?:clearance for procedure  Would the patient rather a call back or a response via [facility]? Please fax today  Best Call Back Number:fax# [phone]  Additional Information:

## 2024-01-18 NOTE — LETTER
2024    Michael Hawkins  [address] [place] [address]             [facility]  [address]  [address]  [place] [address]  Phone: [phone]  Fax: [phone] Patient: Michael Hawkins  : 1940  Referring Doctor: Dr. France  Procedure: Cysto poss biopsy    Current Outpatient Medications   Medication Sig    amlodipine (NORVASC) 5 MG tablet Take 5 mg by mouth 2 (two) times daily.    amLODIPine besylate, bulk, 100 % Powd Take 10 mg by mouth.    ascorbic acid, vitamin C, (VITAMIN C) 500 MG tablet Take 250 mg by mouth once daily.    aspirin 81 MG Chew Take 81 mg by mouth once daily.    betamethasone dipropionate (SERNIVO) 0.05 % SprP Apply topically.    carbamazepine (TEGRETOL) 200 mg tablet Take 200 mg by mouth 3 (three) times daily.    cholecalciferol, vitamin D3, 400 unit Tab Take 400 Units by mouth 2 (two) times a day.    cloNIDine (CATAPRES) 0.1 MG tablet Take 1 tablet (0.1 mg total) by mouth 2 (two) times daily as needed (SBP> 160). Prn three times a day (Patient taking differently: Take 0.1 mg by mouth 3 (three) times daily. Prn three times a day)    clopidogreL (PLAVIX) 75 mg tablet TAKE 1 TABLET EVERY OTHER DAY (Patient taking differently: SEND TO [facility])    ezetimibe (ZETIA) 10 mg tablet TAKE 1 TABLET DAILY    ferrous sulfate 324 mg (65 mg iron) TbEC Take 324 mg by mouth once daily.    fish oil-omega-3 fatty acids 300-1,000 mg capsule Take 1 capsule by mouth once daily. 1000mg daily    fluticasone propionate (FLONASE) 50 mcg/actuation nasal spray 1 spray by Each Nostril route once daily. One spray each nostril daily    fosinopril (MONOPRIL) 40 MG tablet Take 40 mg by mouth once daily.    isosorbide mononitrate (IMDUR) 30 MG 24 hr tablet Take 1 tablet (30 mg total) by mouth once daily. (Patient taking differently: Take 30 mg by mouth once daily. [facility])    LACTOBAC NO.41/BIFIDOBACT NO.7  (PROBIOTIC-10 ORAL) Take 1 tablet by mouth once daily.    levothyroxine (SYNTHROID, LEVOTHROID) 175 MCG tablet Take 175 mcg by mouth once daily.    linaCLOtide (LINZESS) 145 mcg Cap capsule Take 145 mcg by mouth daily as needed.    MAGNESIUM CITRATE ORAL Take 135 mg by mouth 2 (two) times a day.    metoprolol succinate (TOPROL-XL) 50 MG 24 hr tablet Take 50 mg by mouth once daily. qam    multivitamin (THERAGRAN) per tablet Take 1 tablet by mouth once daily.    nitroGLYCERIN (NITROSTAT) 0.4 MG SL tablet Place 0.4 mg under the tongue every 5 (five) minutes as needed for Chest pain. Prn chest pain    oxybutynin (DITROPAN-XL) 10 MG 24 hr tablet Take 10 mg by mouth once daily.    potassium citrate (UROCIT-K) 10 mEq (1,080 mg) TbSR Take 20 mEq by mouth 2 (two) times a day. Takes 2 tabs bid, send to [facility]    psyllium (METAMUCIL) packet Take 1 packet by mouth once daily. 1 tsp daily    quetiapine (SEROQUEL XR) 400 MG Tb24 Take 400 mg by mouth once daily.     rosuvastatin (CRESTOR) 40 MG Tab SEND TO [facility]    temazepam (RESTORIL) 15 mg Cap Take 30 mg by mouth every evening.    terazosin (HYTRIN) 5 MG capsule Take 1 capsule (5 mg total) by mouth every evening.    ubidecarenone (COENZYME Q10) 100 mg Tab Take 100 mg by mouth 2 (two) times daily.     No current facility-administered medications for this visit.       This patient has been assessed for risk factors for clearance of surgery with the following stipulations:    ___ No contraindications  ___ Recommendations for Plavix, hold x __ days  ___ Low risk __ Moderate risk __ High risk       If you have any questions regarding the above, please contact my office at [phone].    Sincerely,

## 2024-01-18 NOTE — TELEPHONE ENCOUNTER
----- Message from Latasha Grimaldo sent at 1/18/2024  2:47 PM CST -----  Type:  Needs Medical Advice    Who Called: pt   Best Call Back Number: .[phone] (home)       Additional Information:  Requesting call back  wants to dx stop taking blood thinner for sx 1/26    Dr. France  need this information asap [phone]  fax [phone]  please advise thank you

## 2024-01-22 ENCOUNTER — TELEPHONE (OUTPATIENT)
Dept: CARDIOLOGY | Facility: CLINIC | Age: 84
End: 2024-01-22
Payer: MEDICARE

## 2024-01-22 NOTE — TELEPHONE ENCOUNTER
----- Message from Marlena Polanco sent at 1/22/2024  1:02 PM CST -----  Type: Need Medical Advice   Who Called:Patient  Best callback number: [phone]  Additional Information: Patient called stated Dr France office will take a verbal statement for him to stop the blood thinners for Fridays appointment patient ask for a callback   Patient will have to stop taking it today  Please call to further assist, Thanks.

## 2024-01-24 RX ORDER — CLOPIDOGREL BISULFATE 75 MG/1
TABLET ORAL
Qty: 90 TABLET | Refills: 3 | Status: SHIPPED | OUTPATIENT
Start: 2024-01-24

## 2024-01-26 DIAGNOSIS — C64.1 MALIGNANT NEOPLASM OF RIGHT KIDNEY, EXCEPT RENAL PELVIS: Primary | ICD-10-CM

## 2024-01-31 RX ORDER — TERAZOSIN 5 MG/1
5 CAPSULE ORAL NIGHTLY
Qty: 90 CAPSULE | Refills: 3 | Status: SHIPPED | OUTPATIENT
Start: 2024-01-31

## 2024-03-12 ENCOUNTER — TELEPHONE (OUTPATIENT)
Dept: CARDIOLOGY | Facility: CLINIC | Age: 84
End: 2024-03-12
Payer: MEDICARE

## 2024-03-12 NOTE — TELEPHONE ENCOUNTER
----- Message from Doug Morel sent at 3/12/2024 10:02 AM CDT -----  Regarding: appointment  Contact: patient  Type:  Sooner Apoointment Request    Caller is requesting a sooner appointment.  Caller declined first available appointment listed below.  Caller will not accept being placed on the waitlist and is requesting a message be sent to doctor.  Name of Caller:patient  When is the first available appointment?unable to schedule  Symptoms:Chest Pain for weeks  Would the patient rather a call back or a response via [facility]? Please call to schedule sooner  Best Call Back Number:[phone] or [phone]  Additional Information:

## 2024-03-13 ENCOUNTER — OFFICE VISIT (OUTPATIENT)
Dept: CARDIOLOGY | Facility: CLINIC | Age: 84
End: 2024-03-13
Payer: MEDICARE

## 2024-03-13 VITALS
OXYGEN SATURATION: 98 % | BODY MASS INDEX: 27.67 KG/M2 | DIASTOLIC BLOOD PRESSURE: 69 MMHG | HEART RATE: 77 BPM | SYSTOLIC BLOOD PRESSURE: 117 MMHG | WEIGHT: 187.38 LBS

## 2024-03-13 DIAGNOSIS — N17.9 AKI (ACUTE KIDNEY INJURY): ICD-10-CM

## 2024-03-13 DIAGNOSIS — I10 ESSENTIAL HYPERTENSION: ICD-10-CM

## 2024-03-13 DIAGNOSIS — R94.31 ABNORMAL ELECTROCARDIOGRAM (ECG) (EKG): ICD-10-CM

## 2024-03-13 DIAGNOSIS — I25.112 CORONARY ARTERY DISEASE INVOLVING NATIVE CORONARY ARTERY OF NATIVE HEART WITH REFRACTORY ANGINA PECTORIS: ICD-10-CM

## 2024-03-13 DIAGNOSIS — E78.2 MIXED HYPERLIPIDEMIA: ICD-10-CM

## 2024-03-13 DIAGNOSIS — E07.9 THYROID DYSFUNCTION: ICD-10-CM

## 2024-03-13 DIAGNOSIS — I25.110 ATHEROSCLEROSIS OF NATIVE CORONARY ARTERY OF NATIVE HEART WITH UNSTABLE ANGINA PECTORIS: Primary | ICD-10-CM

## 2024-03-13 DIAGNOSIS — R07.9 CHEST PAIN, UNSPECIFIED TYPE: ICD-10-CM

## 2024-03-13 PROCEDURE — 93010 ELECTROCARDIOGRAM REPORT: CPT | Mod: S$PBB,,, | Performed by: INTERNAL MEDICINE

## 2024-03-13 PROCEDURE — 99214 OFFICE O/P EST MOD 30 MIN: CPT | Mod: S$PBB,,, | Performed by: NURSE PRACTITIONER

## 2024-03-13 PROCEDURE — 93005 ELECTROCARDIOGRAM TRACING: CPT | Mod: PBBFAC,PN | Performed by: INTERNAL MEDICINE

## 2024-03-13 PROCEDURE — 99999 PR PBB SHADOW E&M-EST. PATIENT-LVL IV: CPT | Mod: PBBFAC,,, | Performed by: NURSE PRACTITIONER

## 2024-03-13 PROCEDURE — 99214 OFFICE O/P EST MOD 30 MIN: CPT | Mod: PBBFAC,PN,25 | Performed by: NURSE PRACTITIONER

## 2024-03-13 RX ORDER — DIPHENHYDRAMINE HCL 50 MG
50 CAPSULE ORAL
Status: CANCELLED | OUTPATIENT
Start: 2024-03-13

## 2024-03-13 RX ORDER — ISOSORBIDE MONONITRATE 60 MG/1
60 TABLET, EXTENDED RELEASE ORAL DAILY
Qty: 30 TABLET | Refills: 0 | Status: SHIPPED | OUTPATIENT
Start: 2024-03-13 | End: 2024-04-11

## 2024-03-13 RX ORDER — ISOSORBIDE MONONITRATE 60 MG/1
60 TABLET, EXTENDED RELEASE ORAL DAILY
Qty: 30 TABLET | Refills: 0 | Status: SHIPPED | OUTPATIENT
Start: 2024-03-13 | End: 2024-03-13

## 2024-03-13 RX ORDER — SODIUM CHLORIDE 9 MG/ML
INJECTION, SOLUTION INTRAVENOUS ONCE
Status: CANCELLED | OUTPATIENT
Start: 2024-03-13 | End: 2024-03-13

## 2024-03-13 NOTE — PROGRESS NOTES
"Subjective:    Patient ID:  Michael Hawkins is a 84 y.o. male who presents for follow-up.  Chief Complaint   Patient presents with    Chest Pain       HPI:      Mr. Hawkins is here today for a follow up visit. He was recently in the Hospital with colitis and diverticulitis on PO Antibiotics last dose today. On and off for 3 weeks he has had Chest tightness and SOB. This is worse with exertion. Recently he was in the [facility]. He had H/H 13/36.8. He had SARAH. He had Elevated Troponin. He had an elevated WBC, ANC. Platelet count 133. Patient kidney function in November was normal. We will need to repeat labs. He denies pain currently however his wife says he "loses his breath easily and frequently." EKGs previous and Now shown to Dr. Lan Srivastava.       Review of patient's allergies indicates:  No Known Allergies    Past Medical History:   Diagnosis Date    Basal cell carcinoma     Coronary artery disease     cardiac stents    Hypertension     Nonmelanoma skin cancer     Prostate cancer     Pulmonary embolism      Past Surgical History:   Procedure Laterality Date    FOREARM HARDWARE REMOVAL       Social History     Tobacco Use    Smoking status: Former     Current packs/day: 0.00     Types: Cigarettes     Quit date: 1992     Years since quittin.5    Smokeless tobacco: Never   Substance Use Topics    Alcohol use: No    Drug use: No     History reviewed. No pertinent family history.     Review of Systems:   Constitution: Negative for diaphoresis and fever.   HEENT: Negative for nosebleeds.    Cardiovascular:  + for chest pain      + dyspnea on exertion       No leg swelling        No palpitations  Respiratory: Negative for shortness of breath and wheezing.    Hematologic/Lymphatic: Negative for bleeding problem. Does not bruise/bleed easily.   Skin: Negative for color change and rash.   Musculoskeletal: Negative for falls and myalgias.   Gastrointestinal: Negative for hematemesis and " hematochezia.   Genitourinary: Negative for hematuria.   Neurological: Negative for dizziness and light-headedness.   Psychiatric/Behavioral: Negative for altered mental status and memory loss.          Objective:        Vitals:    03/13/24 1140   BP: 117/69   Pulse: 77       Lab Results   Component Value Date    WBC 5.14 11/09/2022    HGB 11.7 (L) 11/09/2022    HCT 33.9 (L) 11/09/2022     11/09/2022    ALT 21 11/09/2022    AST 24 11/09/2022     (L) 11/02/2023    K 4.5 11/02/2023    CL 99 11/02/2023    CREATININE 1.0 11/02/2023    BUN 17 11/02/2023    CO2 29 11/02/2023    TSH 2.110 11/09/2022    INR 1.2 11/09/2022        ECHOCARDIOGRAM RESULTS  No results found for this or any previous visit.        CURRENT/PREVIOUS VISIT EKG  Results for orders placed or performed during the hospital encounter of 11/09/22   EKG 12-lead    Collection Time: 11/09/22  2:32 PM    Narrative    Test Reason : R07.9,    Vent. Rate : 073 BPM     Atrial Rate : 073 BPM     P-R Int : 194 ms          QRS Dur : 098 ms      QT Int : 364 ms       P-R-T Axes : 053 077 069 degrees     QTc Int : 401 ms    Normal sinus rhythm  Nonspecific ST and T wave abnormality  Abnormal ECG  When compared with ECG of 12-AUG-2022 10:01,  Nonspecific T wave abnormality no longer evident in Inferior leads  Nonspecific T wave abnormality has replaced inverted T waves in Lateral  leads  Confirmed by Lan Srivastava MD ([id number]) on 11/17/2022 6:54:45 PM    Referred By: VERA   SELF           Confirmed By:Lan Srivastava MD     No valid procedures specified.   Results for orders placed in visit on 06/09/22    Nuclear Stress Test    Interpretation Summary    The nuclear portion of this study will be reported separately.    The EKG portion of this study is negative for ischemia.    The patient reported no chest pain during the stress test.    There were no arrhythmias during stress.      Physical Exam:  CONSTITUTIONAL: No fever, no chills  HEENT:  Normocephalic, atraumatic,pupils reactive to light                 NECK:  No JVD no carotid bruit  CVS: S1S2+, RRR, no murmurs,   LUNGS: Clear  ABDOMEN: Soft, NT, BS+  EXTREMITIES: No cyanosis, edema  : No alvarez catheter  NEURO: AAO X 3  PSY: Normal affect      Medication List with Changes/Refills   Current Medications    AMLODIPINE (NORVASC) 5 MG TABLET    Take 5 mg by mouth 2 (two) times daily.    ASCORBIC ACID, VITAMIN C, (VITAMIN C) 500 MG TABLET    Take 250 mg by mouth once daily.    ASPIRIN 81 MG CHEW    Take 81 mg by mouth once daily.    BETAMETHASONE DIPROPIONATE (SERNIVO) 0.05 % SPRP    Apply topically.    CARBAMAZEPINE (TEGRETOL) 200 MG TABLET    Take 200 mg by mouth 3 (three) times daily.    CHOLECALCIFEROL, VITAMIN D3, 400 UNIT TAB    Take 400 Units by mouth 2 (two) times a day.    CLONIDINE (CATAPRES) 0.1 MG TABLET    Take 1 tablet (0.1 mg total) by mouth 2 (two) times daily as needed (SBP> 160). Prn three times a day    CLOPIDOGREL (PLAVIX) 75 MG TABLET    TAKE 1 TABLET EVERY OTHER DAY    EZETIMIBE (ZETIA) 10 MG TABLET    TAKE 1 TABLET DAILY    FERROUS SULFATE 324 MG (65 MG IRON) TBEC    Take 324 mg by mouth once daily.    FISH OIL-OMEGA-3 FATTY ACIDS 300-1,000 MG CAPSULE    Take 1 capsule by mouth once daily. 1000mg daily    FLUTICASONE PROPIONATE (FLONASE) 50 MCG/ACTUATION NASAL SPRAY    1 spray by Each Nostril route once daily. One spray each nostril daily    FOSINOPRIL (MONOPRIL) 40 MG TABLET    Take 40 mg by mouth once daily.    LACTOBAC NO.41/BIFIDOBACT NO.7 (PROBIOTIC-10 ORAL)    Take 1 tablet by mouth once daily.    LEVOTHYROXINE (SYNTHROID, LEVOTHROID) 175 MCG TABLET    Take 175 mcg by mouth once daily.    LINACLOTIDE (LINZESS) 145 MCG CAP CAPSULE    Take 145 mcg by mouth daily as needed.    MAGNESIUM CITRATE ORAL    Take 135 mg by mouth 2 (two) times a day.    METOPROLOL SUCCINATE (TOPROL-XL) 50 MG 24 HR TABLET    Take 50 mg by mouth once daily. qam    MULTIVITAMIN (THERAGRAN) PER TABLET     Take 1 tablet by mouth once daily.    NITROGLYCERIN (NITROSTAT) 0.4 MG SL TABLET    Place 0.4 mg under the tongue every 5 (five) minutes as needed for Chest pain. Prn chest pain    OXYBUTYNIN (DITROPAN-XL) 10 MG 24 HR TABLET    Take 10 mg by mouth once daily.    POTASSIUM CITRATE (UROCIT-K) 10 MEQ (1,080 MG) TBSR    Take 20 mEq by mouth 2 (two) times a day. Takes 2 tabs bid, send to [facility]    PSYLLIUM (METAMUCIL) PACKET    Take 1 packet by mouth once daily. 1 tsp daily    QUETIAPINE (SEROQUEL XR) 400 MG TB24    Take 400 mg by mouth once daily.     ROSUVASTATIN (CRESTOR) 40 MG TAB    SEND TO [facility]    TEMAZEPAM (RESTORIL) 15 MG CAP    Take 30 mg by mouth every evening.    TERAZOSIN (HYTRIN) 5 MG CAPSULE    TAKE 1 CAPSULE EVERY EVENING    UBIDECARENONE (COENZYME Q10) 100 MG TAB    Take 100 mg by mouth 2 (two) times daily.   Changed and/or Refilled Medications    Modified Medication Previous Medication    ISOSORBIDE MONONITRATE (IMDUR) 60 MG 24 HR TABLET isosorbide mononitrate (IMDUR) 30 MG 24 hr tablet       Take 1 tablet (60 mg total) by mouth once daily.    Take 1 tablet (30 mg total) by mouth once daily.                     Assessment:       1. Atherosclerosis of native coronary artery of native heart with unstable angina pectoris    2. Thyroid dysfunction    3. Mixed hyperlipidemia    4. Essential hypertension    5. Abnormal electrocardiogram (ECG) (EKG)    6. SARAH (acute kidney injury)    7. 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 JUDITH Srivastava on how to best take care of this patient.   Recommended [facility] for definitive evaluation and management  With recent diverticulitis and colitis will wait until next week  Will recheck labs CBC, CMP  Increase Imdur  EKG shows strain pattern    Discussed with patient the benefits and options at length in detail.  Coronary Angiogram +/- PCI  AntiPlatelet  therapy-  Asprin Plavix Brilinta  Access - Bilateral CFA/ Radial  Allergies : No Iodine Allergy  Pre Hydration IVF  cc /hr  Pre Procedure Medication : Benadryl 25 - 50 mg po prior to procedure X 1  No recent head trauma.  No bleeding issues or Blood in the stools or Urine.   Risks benefits and alternate options were explained to the Patient.    Risks include but not limited to bleeding, allergic reaction to the dye, vascular damage, renal failure with potential need for Dialysis, Infection, Rhythm abnormalities, stroke, myocardial infarction, emergency bypass surgery and death.    The risks is of moderate sedation include hypotension respiratory depression arrhythmias bronchospasm and death.  Patient does understand these risks and wants to proceed with cardiac catheterization.  Should stenting be indicated, patient has agreed to dual antiplatelet therapy for 12 to 18 months with drug-eluting stent and a minimum of 1 month of dual antiplatelet therapy with the use of bare metal stent.  Additionally patient is aware of the noncompliance with medications is likely to result in the stent clotting with heart attack and heart failure and or death.  All questions have been answered to patient's satisfaction.And patient has voiced to proceed with the procedure.  Verbal consent has been obtained and patient is agreeable to proceed with the procedure.             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       SONJA Wilson-ACNP, CVNP-BC

## 2024-03-13 NOTE — H&P (VIEW-ONLY)
"Subjective:    Patient ID:  Michael Hawkins is a 84 y.o. male who presents for follow-up.  Chief Complaint   Patient presents with    Chest Pain       HPI:      Mr. Hawkins is here today for a follow up visit. He was recently in the Hospital with colitis and diverticulitis on PO Antibiotics last dose today. On and off for 3 weeks he has had Chest tightness and SOB. This is worse with exertion. Recently he was in the [facility]. He had H/H 13/36.8. He had SARAH. He had Elevated Troponin. He had an elevated WBC, ANC. Platelet count 133. Patient kidney function in November was normal. We will need to repeat labs. He denies pain currently however his wife says he "loses his breath easily and frequently." EKGs previous and Now shown to Dr. Lan Srivastava.       Review of patient's allergies indicates:  No Known Allergies    Past Medical History:   Diagnosis Date    Basal cell carcinoma     Coronary artery disease     cardiac stents    Hypertension     Nonmelanoma skin cancer     Prostate cancer     Pulmonary embolism      Past Surgical History:   Procedure Laterality Date    FOREARM HARDWARE REMOVAL       Social History     Tobacco Use    Smoking status: Former     Current packs/day: 0.00     Types: Cigarettes     Quit date: 1992     Years since quittin.5    Smokeless tobacco: Never   Substance Use Topics    Alcohol use: No    Drug use: No     History reviewed. No pertinent family history.     Review of Systems:   Constitution: Negative for diaphoresis and fever.   HEENT: Negative for nosebleeds.    Cardiovascular:  + for chest pain      + dyspnea on exertion       No leg swelling        No palpitations  Respiratory: Negative for shortness of breath and wheezing.    Hematologic/Lymphatic: Negative for bleeding problem. Does not bruise/bleed easily.   Skin: Negative for color change and rash.   Musculoskeletal: Negative for falls and myalgias.   Gastrointestinal: Negative for hematemesis and " hematochezia.   Genitourinary: Negative for hematuria.   Neurological: Negative for dizziness and light-headedness.   Psychiatric/Behavioral: Negative for altered mental status and memory loss.          Objective:        Vitals:    03/13/24 1140   BP: 117/69   Pulse: 77       Lab Results   Component Value Date    WBC 5.14 11/09/2022    HGB 11.7 (L) 11/09/2022    HCT 33.9 (L) 11/09/2022     11/09/2022    ALT 21 11/09/2022    AST 24 11/09/2022     (L) 11/02/2023    K 4.5 11/02/2023    CL 99 11/02/2023    CREATININE 1.0 11/02/2023    BUN 17 11/02/2023    CO2 29 11/02/2023    TSH 2.110 11/09/2022    INR 1.2 11/09/2022        ECHOCARDIOGRAM RESULTS  No results found for this or any previous visit.        CURRENT/PREVIOUS VISIT EKG  Results for orders placed or performed during the hospital encounter of 11/09/22   EKG 12-lead    Collection Time: 11/09/22  2:32 PM    Narrative    Test Reason : R07.9,    Vent. Rate : 073 BPM     Atrial Rate : 073 BPM     P-R Int : 194 ms          QRS Dur : 098 ms      QT Int : 364 ms       P-R-T Axes : 053 077 069 degrees     QTc Int : 401 ms    Normal sinus rhythm  Nonspecific ST and T wave abnormality  Abnormal ECG  When compared with ECG of 12-AUG-2022 10:01,  Nonspecific T wave abnormality no longer evident in Inferior leads  Nonspecific T wave abnormality has replaced inverted T waves in Lateral  leads  Confirmed by Lan Srivastava MD ([id number]) on 11/17/2022 6:54:45 PM    Referred By: VERA   SELF           Confirmed By:Lan Srivastava MD     No valid procedures specified.   Results for orders placed in visit on 06/09/22    Nuclear Stress Test    Interpretation Summary    The nuclear portion of this study will be reported separately.    The EKG portion of this study is negative for ischemia.    The patient reported no chest pain during the stress test.    There were no arrhythmias during stress.      Physical Exam:  CONSTITUTIONAL: No fever, no chills  HEENT:  Normocephalic, atraumatic,pupils reactive to light                 NECK:  No JVD no carotid bruit  CVS: S1S2+, RRR, no murmurs,   LUNGS: Clear  ABDOMEN: Soft, NT, BS+  EXTREMITIES: No cyanosis, edema  : No alvarez catheter  NEURO: AAO X 3  PSY: Normal affect      Medication List with Changes/Refills   Current Medications    AMLODIPINE (NORVASC) 5 MG TABLET    Take 5 mg by mouth 2 (two) times daily.    ASCORBIC ACID, VITAMIN C, (VITAMIN C) 500 MG TABLET    Take 250 mg by mouth once daily.    ASPIRIN 81 MG CHEW    Take 81 mg by mouth once daily.    BETAMETHASONE DIPROPIONATE (SERNIVO) 0.05 % SPRP    Apply topically.    CARBAMAZEPINE (TEGRETOL) 200 MG TABLET    Take 200 mg by mouth 3 (three) times daily.    CHOLECALCIFEROL, VITAMIN D3, 400 UNIT TAB    Take 400 Units by mouth 2 (two) times a day.    CLONIDINE (CATAPRES) 0.1 MG TABLET    Take 1 tablet (0.1 mg total) by mouth 2 (two) times daily as needed (SBP> 160). Prn three times a day    CLOPIDOGREL (PLAVIX) 75 MG TABLET    TAKE 1 TABLET EVERY OTHER DAY    EZETIMIBE (ZETIA) 10 MG TABLET    TAKE 1 TABLET DAILY    FERROUS SULFATE 324 MG (65 MG IRON) TBEC    Take 324 mg by mouth once daily.    FISH OIL-OMEGA-3 FATTY ACIDS 300-1,000 MG CAPSULE    Take 1 capsule by mouth once daily. 1000mg daily    FLUTICASONE PROPIONATE (FLONASE) 50 MCG/ACTUATION NASAL SPRAY    1 spray by Each Nostril route once daily. One spray each nostril daily    FOSINOPRIL (MONOPRIL) 40 MG TABLET    Take 40 mg by mouth once daily.    LACTOBAC NO.41/BIFIDOBACT NO.7 (PROBIOTIC-10 ORAL)    Take 1 tablet by mouth once daily.    LEVOTHYROXINE (SYNTHROID, LEVOTHROID) 175 MCG TABLET    Take 175 mcg by mouth once daily.    LINACLOTIDE (LINZESS) 145 MCG CAP CAPSULE    Take 145 mcg by mouth daily as needed.    MAGNESIUM CITRATE ORAL    Take 135 mg by mouth 2 (two) times a day.    METOPROLOL SUCCINATE (TOPROL-XL) 50 MG 24 HR TABLET    Take 50 mg by mouth once daily. qam    MULTIVITAMIN (THERAGRAN) PER TABLET     Take 1 tablet by mouth once daily.    NITROGLYCERIN (NITROSTAT) 0.4 MG SL TABLET    Place 0.4 mg under the tongue every 5 (five) minutes as needed for Chest pain. Prn chest pain    OXYBUTYNIN (DITROPAN-XL) 10 MG 24 HR TABLET    Take 10 mg by mouth once daily.    POTASSIUM CITRATE (UROCIT-K) 10 MEQ (1,080 MG) TBSR    Take 20 mEq by mouth 2 (two) times a day. Takes 2 tabs bid, send to [facility]    PSYLLIUM (METAMUCIL) PACKET    Take 1 packet by mouth once daily. 1 tsp daily    QUETIAPINE (SEROQUEL XR) 400 MG TB24    Take 400 mg by mouth once daily.     ROSUVASTATIN (CRESTOR) 40 MG TAB    SEND TO [facility]    TEMAZEPAM (RESTORIL) 15 MG CAP    Take 30 mg by mouth every evening.    TERAZOSIN (HYTRIN) 5 MG CAPSULE    TAKE 1 CAPSULE EVERY EVENING    UBIDECARENONE (COENZYME Q10) 100 MG TAB    Take 100 mg by mouth 2 (two) times daily.   Changed and/or Refilled Medications    Modified Medication Previous Medication    ISOSORBIDE MONONITRATE (IMDUR) 60 MG 24 HR TABLET isosorbide mononitrate (IMDUR) 30 MG 24 hr tablet       Take 1 tablet (60 mg total) by mouth once daily.    Take 1 tablet (30 mg total) by mouth once daily.                     Assessment:       1. Atherosclerosis of native coronary artery of native heart with unstable angina pectoris    2. Thyroid dysfunction    3. Mixed hyperlipidemia    4. Essential hypertension    5. Abnormal electrocardiogram (ECG) (EKG)    6. SARAH (acute kidney injury)    7. 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 JUDITH Srivastava on how to best take care of this patient.   Recommended [facility] for definitive evaluation and management  With recent diverticulitis and colitis will wait until next week  Will recheck labs CBC, CMP  Increase Imdur  EKG shows strain pattern    Discussed with patient the benefits and options at length in detail.  Coronary Angiogram +/- PCI  AntiPlatelet  therapy-  Asprin Plavix Brilinta  Access - Bilateral CFA/ Radial  Allergies : No Iodine Allergy  Pre Hydration IVF  cc /hr  Pre Procedure Medication : Benadryl 25 - 50 mg po prior to procedure X 1  No recent head trauma.  No bleeding issues or Blood in the stools or Urine.   Risks benefits and alternate options were explained to the Patient.    Risks include but not limited to bleeding, allergic reaction to the dye, vascular damage, renal failure with potential need for Dialysis, Infection, Rhythm abnormalities, stroke, myocardial infarction, emergency bypass surgery and death.    The risks is of moderate sedation include hypotension respiratory depression arrhythmias bronchospasm and death.  Patient does understand these risks and wants to proceed with cardiac catheterization.  Should stenting be indicated, patient has agreed to dual antiplatelet therapy for 12 to 18 months with drug-eluting stent and a minimum of 1 month of dual antiplatelet therapy with the use of bare metal stent.  Additionally patient is aware of the noncompliance with medications is likely to result in the stent clotting with heart attack and heart failure and or death.  All questions have been answered to patient's satisfaction.And patient has voiced to proceed with the procedure.  Verbal consent has been obtained and patient is agreeable to proceed with the procedure.             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       SONJA Wilson-ACNP, CVNP-BC

## 2024-03-15 ENCOUNTER — TELEPHONE (OUTPATIENT)
Dept: CARDIOLOGY | Facility: CLINIC | Age: 84
End: 2024-03-15
Payer: MEDICARE

## 2024-03-15 DIAGNOSIS — R07.9 CHEST PAIN, UNSPECIFIED TYPE: Primary | ICD-10-CM

## 2024-03-15 LAB
OHS QRS DURATION: 94 MS
OHS QTC CALCULATION: 402 MS

## 2024-03-15 NOTE — TELEPHONE ENCOUNTER
----- Message from Meghan Arrington RN sent at 3/15/2024  9:34 AM CDT -----  Regarding: 3/13 EKG Order  The EKG performed on 3/13/24 is missing an order.  Please place an order so that the EKG can be read in [place].    Thank you,  Meghan Arrington RN  [facility]   [facility] Lab  3rd Floor Cardiology Clinic  [phone]/ [id number]

## 2024-03-19 ENCOUNTER — HOSPITAL ENCOUNTER (OUTPATIENT)
Dept: PREADMISSION TESTING | Facility: HOSPITAL | Age: 84
Discharge: HOME | DRG: 287 | End: 2024-03-19
Attending: INTERNAL MEDICINE
Payer: MEDICARE

## 2024-03-19 VITALS
OXYGEN SATURATION: 99 % | DIASTOLIC BLOOD PRESSURE: 76 MMHG | BODY MASS INDEX: 28.44 KG/M2 | SYSTOLIC BLOOD PRESSURE: 160 MMHG | TEMPERATURE: 98 F | RESPIRATION RATE: 16 BRPM | WEIGHT: 192 LBS | HEIGHT: 69 IN | HEART RATE: 65 BPM

## 2024-03-19 DIAGNOSIS — I25.110 ATHEROSCLEROSIS OF NATIVE CORONARY ARTERY OF NATIVE HEART WITH UNSTABLE ANGINA PECTORIS: ICD-10-CM

## 2024-03-19 DIAGNOSIS — R07.89 OTHER CHEST PAIN: ICD-10-CM

## 2024-03-19 LAB
ANION GAP SERPL CALC-SCNC: 6 MMOL/L (ref 8–16)
BASOPHILS # BLD AUTO: 0.08 K/UL (ref 0–0.2)
BASOPHILS NFR BLD: 1 % (ref 0–1.9)
BUN SERPL-MCNC: 17 MG/DL (ref 8–23)
CALCIUM SERPL-MCNC: 9 MG/DL (ref 8.7–10.5)
CHLORIDE SERPL-SCNC: 101 MMOL/L (ref 95–110)
CO2 SERPL-SCNC: 26 MMOL/L (ref 23–29)
CREAT SERPL-MCNC: 1 MG/DL (ref 0.5–1.4)
DIFFERENTIAL METHOD BLD: ABNORMAL
EOSINOPHIL # BLD AUTO: 0.3 K/UL (ref 0–0.5)
EOSINOPHIL NFR BLD: 3.2 % (ref 0–8)
ERYTHROCYTE [DISTWIDTH] IN BLOOD BY AUTOMATED COUNT: 13.1 % (ref 11.5–14.5)
EST. GFR  (NO RACE VARIABLE): >60 ML/MIN/1.73 M^2
GLUCOSE SERPL-MCNC: 90 MG/DL (ref 70–110)
HCT VFR BLD AUTO: 31.3 % (ref 40–54)
HGB BLD-MCNC: 10.4 G/DL (ref 14–18)
IMM GRANULOCYTES # BLD AUTO: 0.16 K/UL (ref 0–0.04)
IMM GRANULOCYTES NFR BLD AUTO: 2.1 % (ref 0–0.5)
LYMPHOCYTES # BLD AUTO: 1.6 K/UL (ref 1–4.8)
LYMPHOCYTES NFR BLD: 20.5 % (ref 18–48)
MCH RBC QN AUTO: 31.7 PG (ref 27–31)
MCHC RBC AUTO-ENTMCNC: 33.2 G/DL (ref 32–36)
MCV RBC AUTO: 95 FL (ref 82–98)
MONOCYTES # BLD AUTO: 0.6 K/UL (ref 0.3–1)
MONOCYTES NFR BLD: 7.6 % (ref 4–15)
NEUTROPHILS # BLD AUTO: 5.1 K/UL (ref 1.8–7.7)
NEUTROPHILS NFR BLD: 65.6 % (ref 38–73)
NRBC BLD-RTO: 0 /100 WBC
PLATELET # BLD AUTO: 228 K/UL (ref 150–450)
PMV BLD AUTO: 9.3 FL (ref 9.2–12.9)
POTASSIUM SERPL-SCNC: 4.5 MMOL/L (ref 3.5–5.1)
RBC # BLD AUTO: 3.28 M/UL (ref 4.6–6.2)
SODIUM SERPL-SCNC: 133 MMOL/L (ref 136–145)
WBC # BLD AUTO: 7.76 K/UL (ref 3.9–12.7)

## 2024-03-19 PROCEDURE — 93010 ELECTROCARDIOGRAM REPORT: CPT | Mod: ,,, | Performed by: GENERAL PRACTICE

## 2024-03-19 PROCEDURE — 85025 COMPLETE CBC W/AUTO DIFF WBC: CPT | Performed by: NURSE PRACTITIONER

## 2024-03-19 PROCEDURE — 36415 COLL VENOUS BLD VENIPUNCTURE: CPT | Performed by: NURSE PRACTITIONER

## 2024-03-19 PROCEDURE — 80048 BASIC METABOLIC PNL TOTAL CA: CPT | Performed by: NURSE PRACTITIONER

## 2024-03-19 PROCEDURE — 93005 ELECTROCARDIOGRAM TRACING: CPT | Performed by: GENERAL PRACTICE

## 2024-03-19 NOTE — DISCHARGE INSTRUCTIONS
Your procedure is scheduled for:          [facility] thru [facility] at:0830    Nothing to eat or drink after midnight the night before your procedure.  Do not take any medications the morning of your procedure  Bring all your medications with you in the original pill bottles from pharmacy.  If you take blood thinners, ask your doctor if you should stop taking them.  Do not take metformin 24 hours prior to your procedure.  Adjust your insulin or other diabetes medications if needed.   Do your chlorhexidine wash the night before and morning of your procedure.  If you use a CPAP or BiPAP at home, please bring it with you the day of your procedure.  Make arrangements for someone you know to drive you home after your procedure. Taxi and Uber are not acceptable.         Any questions call the [facility] at [phone]

## 2024-03-21 ENCOUNTER — HOSPITAL ENCOUNTER (INPATIENT)
Facility: HOSPITAL | Age: 84
LOS: 2 days | Discharge: HOME HEALTH | DRG: 287 | End: 2024-03-24
Attending: INTERNAL MEDICINE | Admitting: HOSPITALIST
Payer: MEDICARE

## 2024-03-21 DIAGNOSIS — I25.110 ATHEROSCLEROSIS OF NATIVE CORONARY ARTERY OF NATIVE HEART WITH UNSTABLE ANGINA PECTORIS: ICD-10-CM

## 2024-03-21 DIAGNOSIS — I25.10 CAD (CORONARY ARTERY DISEASE): ICD-10-CM

## 2024-03-21 DIAGNOSIS — I95.1 ORTHOSTATIC HYPOTENSION: Primary | ICD-10-CM

## 2024-03-21 LAB
BNP SERPL-MCNC: 448 PG/ML (ref 0–99)
POC ACTIVATED CLOTTING TIME K: 260 SEC (ref 74–137)
SAMPLE: ABNORMAL

## 2024-03-21 PROCEDURE — 25000003 PHARM REV CODE 250: Performed by: NURSE PRACTITIONER

## 2024-03-21 PROCEDURE — 63600175 PHARM REV CODE 636 W HCPCS: Performed by: INTERNAL MEDICINE

## 2024-03-21 PROCEDURE — 93458 L HRT ARTERY/VENTRICLE ANGIO: CPT | Performed by: INTERNAL MEDICINE

## 2024-03-21 PROCEDURE — 99153 MOD SED SAME PHYS/QHP EA: CPT | Performed by: INTERNAL MEDICINE

## 2024-03-21 PROCEDURE — 4A033BC MEASUREMENT OF ARTERIAL PRESSURE, CORONARY, PERCUTANEOUS APPROACH: ICD-10-PCS | Performed by: INTERNAL MEDICINE

## 2024-03-21 PROCEDURE — 25000003 PHARM REV CODE 250: Performed by: INTERNAL MEDICINE

## 2024-03-21 PROCEDURE — 93458 L HRT ARTERY/VENTRICLE ANGIO: CPT | Mod: 26,,, | Performed by: INTERNAL MEDICINE

## 2024-03-21 PROCEDURE — C1769 GUIDE WIRE: HCPCS | Performed by: INTERNAL MEDICINE

## 2024-03-21 PROCEDURE — 92978 ENDOLUMINL IVUS OCT C 1ST: CPT | Mod: 26,LD,, | Performed by: INTERNAL MEDICINE

## 2024-03-21 PROCEDURE — 83880 ASSAY OF NATRIURETIC PEPTIDE: CPT | Performed by: NURSE PRACTITIONER

## 2024-03-21 PROCEDURE — 4A023N7 MEASUREMENT OF CARDIAC SAMPLING AND PRESSURE, LEFT HEART, PERCUTANEOUS APPROACH: ICD-10-PCS | Performed by: INTERNAL MEDICINE

## 2024-03-21 PROCEDURE — B240ZZ3 ULTRASONOGRAPHY OF SINGLE CORONARY ARTERY, INTRAVASCULAR: ICD-10-PCS | Performed by: INTERNAL MEDICINE

## 2024-03-21 PROCEDURE — C1753 CATH, INTRAVAS ULTRASOUND: HCPCS | Performed by: INTERNAL MEDICINE

## 2024-03-21 PROCEDURE — C1894 INTRO/SHEATH, NON-LASER: HCPCS | Performed by: INTERNAL MEDICINE

## 2024-03-21 PROCEDURE — 92978 ENDOLUMINL IVUS OCT C 1ST: CPT | Performed by: INTERNAL MEDICINE

## 2024-03-21 PROCEDURE — 99152 MOD SED SAME PHYS/QHP 5/>YRS: CPT | Mod: ,,, | Performed by: INTERNAL MEDICINE

## 2024-03-21 PROCEDURE — 99152 MOD SED SAME PHYS/QHP 5/>YRS: CPT | Performed by: INTERNAL MEDICINE

## 2024-03-21 PROCEDURE — 93799 UNLISTED CV SVC/PROCEDURE: CPT | Performed by: INTERNAL MEDICINE

## 2024-03-21 PROCEDURE — B2111ZZ FLUOROSCOPY OF MULTIPLE CORONARY ARTERIES USING LOW OSMOLAR CONTRAST: ICD-10-PCS | Performed by: INTERNAL MEDICINE

## 2024-03-21 PROCEDURE — 93571 IV DOP VEL&/PRESS C FLO 1ST: CPT | Mod: 26,52,LC, | Performed by: INTERNAL MEDICINE

## 2024-03-21 PROCEDURE — C1887 CATHETER, GUIDING: HCPCS | Performed by: INTERNAL MEDICINE

## 2024-03-21 RX ORDER — DIPHENHYDRAMINE HCL 25 MG
50 CAPSULE ORAL
Status: DISCONTINUED | OUTPATIENT
Start: 2024-03-21 | End: 2024-03-21 | Stop reason: HOSPADM

## 2024-03-21 RX ORDER — AMLODIPINE BESYLATE 5 MG/1
5 TABLET ORAL 2 TIMES DAILY
Status: DISCONTINUED | OUTPATIENT
Start: 2024-03-21 | End: 2024-03-24

## 2024-03-21 RX ORDER — SODIUM CHLORIDE 9 MG/ML
INJECTION, SOLUTION INTRAVENOUS CONTINUOUS
Status: ACTIVE | OUTPATIENT
Start: 2024-03-21 | End: 2024-03-22

## 2024-03-21 RX ORDER — METOPROLOL SUCCINATE 50 MG/1
50 TABLET, EXTENDED RELEASE ORAL DAILY
Status: DISCONTINUED | OUTPATIENT
Start: 2024-03-22 | End: 2024-03-23

## 2024-03-21 RX ORDER — CARBAMAZEPINE 200 MG/1
200 TABLET ORAL 3 TIMES DAILY
Status: DISCONTINUED | OUTPATIENT
Start: 2024-03-21 | End: 2024-03-24 | Stop reason: HOSPADM

## 2024-03-21 RX ORDER — OXYBUTYNIN CHLORIDE 10 MG/1
10 TABLET, EXTENDED RELEASE ORAL DAILY
Status: DISCONTINUED | OUTPATIENT
Start: 2024-03-22 | End: 2024-03-24 | Stop reason: HOSPADM

## 2024-03-21 RX ORDER — NAPROXEN SODIUM 220 MG/1
81 TABLET, FILM COATED ORAL DAILY
Status: DISCONTINUED | OUTPATIENT
Start: 2024-03-22 | End: 2024-03-24 | Stop reason: HOSPADM

## 2024-03-21 RX ORDER — CLOPIDOGREL BISULFATE 75 MG/1
TABLET ORAL
Status: DISCONTINUED | OUTPATIENT
Start: 2024-03-21 | End: 2024-03-21 | Stop reason: HOSPADM

## 2024-03-21 RX ORDER — CLONIDINE HYDROCHLORIDE 0.1 MG/1
0.1 TABLET ORAL 3 TIMES DAILY
Status: DISCONTINUED | OUTPATIENT
Start: 2024-03-21 | End: 2024-03-21

## 2024-03-21 RX ORDER — DIPHENHYDRAMINE HCL 25 MG
CAPSULE ORAL
Status: DISPENSED
Start: 2024-03-21 | End: 2024-03-21

## 2024-03-21 RX ORDER — AMLODIPINE BESYLATE 5 MG/1
TABLET ORAL
Status: DISPENSED
Start: 2024-03-21 | End: 2024-03-21

## 2024-03-21 RX ORDER — CLOPIDOGREL BISULFATE 75 MG/1
75 TABLET ORAL EVERY OTHER DAY
Status: DISCONTINUED | OUTPATIENT
Start: 2024-03-22 | End: 2024-03-24 | Stop reason: HOSPADM

## 2024-03-21 RX ORDER — POTASSIUM CITRATE 10 MEQ/1
20 TABLET, EXTENDED RELEASE ORAL 2 TIMES DAILY
Status: DISCONTINUED | OUTPATIENT
Start: 2024-03-21 | End: 2024-03-21

## 2024-03-21 RX ORDER — ISOSORBIDE MONONITRATE 60 MG/1
60 TABLET, EXTENDED RELEASE ORAL DAILY
Status: DISCONTINUED | OUTPATIENT
Start: 2024-03-21 | End: 2024-03-24 | Stop reason: HOSPADM

## 2024-03-21 RX ORDER — CLONIDINE HYDROCHLORIDE 0.1 MG/1
0.1 TABLET ORAL 2 TIMES DAILY PRN
Status: DISCONTINUED | OUTPATIENT
Start: 2024-03-21 | End: 2024-03-23

## 2024-03-21 RX ORDER — TEMAZEPAM 15 MG/1
30 CAPSULE ORAL EVERY EVENING
Status: DISCONTINUED | OUTPATIENT
Start: 2024-03-21 | End: 2024-03-24 | Stop reason: HOSPADM

## 2024-03-21 RX ORDER — ACETAMINOPHEN 325 MG/1
650 TABLET ORAL EVERY 6 HOURS PRN
Status: DISCONTINUED | OUTPATIENT
Start: 2024-03-21 | End: 2024-03-24 | Stop reason: HOSPADM

## 2024-03-21 RX ORDER — HEPARIN SODIUM 10000 [USP'U]/ML
INJECTION, SOLUTION INTRAVENOUS; SUBCUTANEOUS
Status: DISCONTINUED | OUTPATIENT
Start: 2024-03-21 | End: 2024-03-21 | Stop reason: HOSPADM

## 2024-03-21 RX ORDER — FLUTICASONE PROPIONATE 50 MCG
1 SPRAY, SUSPENSION (ML) NASAL DAILY
Status: DISCONTINUED | OUTPATIENT
Start: 2024-03-22 | End: 2024-03-24 | Stop reason: HOSPADM

## 2024-03-21 RX ORDER — NITROGLYCERIN 0.4 MG/1
0.4 TABLET SUBLINGUAL EVERY 5 MIN PRN
Status: DISCONTINUED | OUTPATIENT
Start: 2024-03-21 | End: 2024-03-24 | Stop reason: HOSPADM

## 2024-03-21 RX ORDER — ISOSORBIDE MONONITRATE 60 MG/1
60 TABLET, EXTENDED RELEASE ORAL DAILY
Status: DISCONTINUED | OUTPATIENT
Start: 2024-03-22 | End: 2024-03-21

## 2024-03-21 RX ORDER — QUETIAPINE FUMARATE 100 MG/1
400 TABLET, FILM COATED ORAL DAILY
Status: DISCONTINUED | OUTPATIENT
Start: 2024-03-21 | End: 2024-03-23

## 2024-03-21 RX ORDER — AMLODIPINE BESYLATE 2.5 MG/1
5 TABLET ORAL 2 TIMES DAILY
Qty: 120 TABLET | Refills: 11 | Status: SHIPPED | OUTPATIENT
Start: 2024-03-21 | End: 2024-03-24 | Stop reason: HOSPADM

## 2024-03-21 RX ORDER — AMLODIPINE BESYLATE 5 MG/1
5 TABLET ORAL ONCE
Status: COMPLETED | OUTPATIENT
Start: 2024-03-21 | End: 2024-03-21

## 2024-03-21 RX ORDER — FENTANYL CITRATE 50 UG/ML
INJECTION, SOLUTION INTRAMUSCULAR; INTRAVENOUS
Status: DISCONTINUED | OUTPATIENT
Start: 2024-03-21 | End: 2024-03-21 | Stop reason: HOSPADM

## 2024-03-21 RX ORDER — LANOLIN ALCOHOL/MO/W.PET/CERES
1 CREAM (GRAM) TOPICAL DAILY
Status: DISCONTINUED | OUTPATIENT
Start: 2024-03-22 | End: 2024-03-24 | Stop reason: HOSPADM

## 2024-03-21 RX ORDER — EZETIMIBE 10 MG/1
10 TABLET ORAL DAILY
Status: DISCONTINUED | OUTPATIENT
Start: 2024-03-22 | End: 2024-03-24 | Stop reason: HOSPADM

## 2024-03-21 RX ORDER — MIDAZOLAM HYDROCHLORIDE 1 MG/ML
INJECTION INTRAMUSCULAR; INTRAVENOUS
Status: DISCONTINUED | OUTPATIENT
Start: 2024-03-21 | End: 2024-03-21 | Stop reason: HOSPADM

## 2024-03-21 RX ORDER — LIDOCAINE HYDROCHLORIDE 10 MG/ML
INJECTION, SOLUTION EPIDURAL; INFILTRATION; INTRACAUDAL; PERINEURAL
Status: DISCONTINUED | OUTPATIENT
Start: 2024-03-21 | End: 2024-03-21 | Stop reason: HOSPADM

## 2024-03-21 RX ORDER — SODIUM CHLORIDE 9 MG/ML
INJECTION, SOLUTION INTRAVENOUS ONCE
Status: COMPLETED | OUTPATIENT
Start: 2024-03-21 | End: 2024-03-21

## 2024-03-21 RX ORDER — ATORVASTATIN CALCIUM 40 MG/1
40 TABLET, FILM COATED ORAL DAILY
Status: DISCONTINUED | OUTPATIENT
Start: 2024-03-22 | End: 2024-03-24 | Stop reason: HOSPADM

## 2024-03-21 RX ADMIN — SODIUM CHLORIDE 250 ML: 9 INJECTION, SOLUTION INTRAVENOUS at 11:03

## 2024-03-21 RX ADMIN — AMLODIPINE BESYLATE 5 MG: 5 TABLET ORAL at 08:03

## 2024-03-21 RX ADMIN — CARBAMAZEPINE 200 MG: 200 TABLET ORAL at 08:03

## 2024-03-21 RX ADMIN — AMLODIPINE BESYLATE 5 MG: 5 TABLET ORAL at 11:03

## 2024-03-21 RX ADMIN — TEMAZEPAM 30 MG: 15 CAPSULE ORAL at 11:03

## 2024-03-21 RX ADMIN — QUETIAPINE 400 MG: 100 TABLET ORAL at 11:03

## 2024-03-21 RX ADMIN — CLONIDINE HYDROCHLORIDE 0.1 MG: 0.1 TABLET ORAL at 11:03

## 2024-03-21 RX ADMIN — SODIUM CHLORIDE: 9 INJECTION, SOLUTION INTRAVENOUS at 08:03

## 2024-03-21 RX ADMIN — DIPHENHYDRAMINE HYDROCHLORIDE 50 MG: 25 CAPSULE ORAL at 08:03

## 2024-03-21 RX ADMIN — ACETAMINOPHEN 650 MG: 325 TABLET ORAL at 10:03

## 2024-03-21 NOTE — PLAN OF CARE
Nursing Transfer Note    Report called to JAIRON Woodward at 1611.  Patient transfer at 1710.      Transfer To: [id number]    Transfer From: [facility]    Transfer via stretcher    Transfer with cardiac monitoring, all personal belongings    Transported by JAIRON Cardona    Chart send with patient: Yes    Notified: spouse at bedside    Upon arrival to floor: cardiac monitor applied, patient oriented to room, call bell in reach, and bed in lowest position

## 2024-03-21 NOTE — PLAN OF CARE
Ambulated onto unit without difficulty. No complaints of pain or distress noted. Undressed of personal clothing and gown on. Pre-op. Resting in bed with call light in reach. Spouse at bedside.

## 2024-03-21 NOTE — PLAN OF CARE
Bedside hand off report given to JAIRON Stephenson .  Chart given. Left to cath lab via stretcher for scheduled procedure. Spouse returned to waiting area.

## 2024-03-21 NOTE — PROGRESS NOTES
Patient tolerated procedure.  Patient developed SOB post procedure with elevated BP and recent SARAH   Have decided to keep patient overnight for observation  See orders for details.      Goal to control BP tonight   Check kidney function in am if stable will DC

## 2024-03-21 NOTE — Clinical Note
The catheter was inserted over the wire into the ostium   left main. An angiography was performed of the left coronary arteries. Multiple views were taken. The angiography was performed via power injection. The injected amount was 8 mL contrast at 4 mL/s. The PSI from the power injection was 400.

## 2024-03-21 NOTE — PLAN OF CARE
Report received from cath lab RN. Arrived back from cath lab via stretcher. No complaints of pain or distress noted. TR Band to right radial CDI. Post angio instructions given. Resting in bed with call light in reach.

## 2024-03-22 PROBLEM — I95.1 ORTHOSTATIC HYPOTENSION: Status: ACTIVE | Noted: 2024-03-22

## 2024-03-22 PROBLEM — I10 ORTHOSTATIC HYPERTENSION: Status: ACTIVE | Noted: 2024-03-22

## 2024-03-22 LAB
ALBUMIN SERPL BCP-MCNC: 3.9 G/DL (ref 3.5–5.2)
ALP SERPL-CCNC: 47 U/L (ref 55–135)
ALT SERPL W/O P-5'-P-CCNC: 20 U/L (ref 10–44)
ANION GAP SERPL CALC-SCNC: 5 MMOL/L (ref 8–16)
AST SERPL-CCNC: 16 U/L (ref 10–40)
BASOPHILS # BLD AUTO: 0.07 K/UL (ref 0–0.2)
BASOPHILS NFR BLD: 1.5 % (ref 0–1.9)
BILIRUB SERPL-MCNC: 0.4 MG/DL (ref 0.1–1)
BUN SERPL-MCNC: 12 MG/DL (ref 8–23)
CALCIUM SERPL-MCNC: 8.7 MG/DL (ref 8.7–10.5)
CHLORIDE SERPL-SCNC: 106 MMOL/L (ref 95–110)
CO2 SERPL-SCNC: 25 MMOL/L (ref 23–29)
CREAT SERPL-MCNC: 0.9 MG/DL (ref 0.5–1.4)
DIFFERENTIAL METHOD BLD: ABNORMAL
EOSINOPHIL # BLD AUTO: 0.2 K/UL (ref 0–0.5)
EOSINOPHIL NFR BLD: 3.7 % (ref 0–8)
ERYTHROCYTE [DISTWIDTH] IN BLOOD BY AUTOMATED COUNT: 13.2 % (ref 11.5–14.5)
ERYTHROCYTE [DISTWIDTH] IN BLOOD BY AUTOMATED COUNT: 13.3 % (ref 11.5–14.5)
EST. GFR  (NO RACE VARIABLE): >60 ML/MIN/1.73 M^2
GLUCOSE SERPL-MCNC: 102 MG/DL (ref 70–110)
HCT VFR BLD AUTO: 29.2 % (ref 40–54)
HCT VFR BLD AUTO: 31.1 % (ref 40–54)
HGB BLD-MCNC: 10.3 G/DL (ref 14–18)
HGB BLD-MCNC: 9.8 G/DL (ref 14–18)
IMM GRANULOCYTES # BLD AUTO: 0.03 K/UL (ref 0–0.04)
IMM GRANULOCYTES NFR BLD AUTO: 0.7 % (ref 0–0.5)
LYMPHOCYTES # BLD AUTO: 1.1 K/UL (ref 1–4.8)
LYMPHOCYTES NFR BLD: 23.5 % (ref 18–48)
MCH RBC QN AUTO: 31.1 PG (ref 27–31)
MCH RBC QN AUTO: 31.7 PG (ref 27–31)
MCHC RBC AUTO-ENTMCNC: 33.1 G/DL (ref 32–36)
MCHC RBC AUTO-ENTMCNC: 33.6 G/DL (ref 32–36)
MCV RBC AUTO: 94 FL (ref 82–98)
MCV RBC AUTO: 95 FL (ref 82–98)
MONOCYTES # BLD AUTO: 0.6 K/UL (ref 0.3–1)
MONOCYTES NFR BLD: 13.2 % (ref 4–15)
NEUTROPHILS # BLD AUTO: 2.6 K/UL (ref 1.8–7.7)
NEUTROPHILS NFR BLD: 57.4 % (ref 38–73)
NRBC BLD-RTO: 0 /100 WBC
PLATELET # BLD AUTO: 222 K/UL (ref 150–450)
PLATELET # BLD AUTO: 235 K/UL (ref 150–450)
PMV BLD AUTO: 8.8 FL (ref 9.2–12.9)
PMV BLD AUTO: 9 FL (ref 9.2–12.9)
POTASSIUM SERPL-SCNC: 4.2 MMOL/L (ref 3.5–5.1)
PROT SERPL-MCNC: 6.4 G/DL (ref 6–8.4)
RBC # BLD AUTO: 3.09 M/UL (ref 4.6–6.2)
RBC # BLD AUTO: 3.31 M/UL (ref 4.6–6.2)
SODIUM SERPL-SCNC: 136 MMOL/L (ref 136–145)
WBC # BLD AUTO: 4.55 K/UL (ref 3.9–12.7)
WBC # BLD AUTO: 5.27 K/UL (ref 3.9–12.7)

## 2024-03-22 PROCEDURE — 85025 COMPLETE CBC W/AUTO DIFF WBC: CPT

## 2024-03-22 PROCEDURE — 80053 COMPREHEN METABOLIC PANEL: CPT | Performed by: NURSE PRACTITIONER

## 2024-03-22 PROCEDURE — 36415 COLL VENOUS BLD VENIPUNCTURE: CPT

## 2024-03-22 PROCEDURE — 25000003 PHARM REV CODE 250: Performed by: INTERNAL MEDICINE

## 2024-03-22 PROCEDURE — 21400001 HC TELEMETRY ROOM

## 2024-03-22 PROCEDURE — 85027 COMPLETE CBC AUTOMATED: CPT | Performed by: NURSE PRACTITIONER

## 2024-03-22 PROCEDURE — 25000003 PHARM REV CODE 250: Performed by: NURSE PRACTITIONER

## 2024-03-22 PROCEDURE — 36415 COLL VENOUS BLD VENIPUNCTURE: CPT | Performed by: NURSE PRACTITIONER

## 2024-03-22 RX ORDER — SODIUM CHLORIDE 9 MG/ML
INJECTION, SOLUTION INTRAVENOUS CONTINUOUS
Status: DISCONTINUED | OUTPATIENT
Start: 2024-03-22 | End: 2024-03-24 | Stop reason: HOSPADM

## 2024-03-22 RX ADMIN — OXYBUTYNIN CHLORIDE 10 MG: 10 TABLET, EXTENDED RELEASE ORAL at 10:03

## 2024-03-22 RX ADMIN — CARBAMAZEPINE 200 MG: 200 TABLET ORAL at 09:03

## 2024-03-22 RX ADMIN — LEVOTHYROXINE SODIUM 175 MCG: 0.1 TABLET ORAL at 06:03

## 2024-03-22 RX ADMIN — METOPROLOL SUCCINATE 50 MG: 50 TABLET, FILM COATED, EXTENDED RELEASE ORAL at 10:03

## 2024-03-22 RX ADMIN — AMLODIPINE BESYLATE 5 MG: 5 TABLET ORAL at 10:03

## 2024-03-22 RX ADMIN — FERROUS SULFATE TAB 325 MG (65 MG ELEMENTAL FE) 1 EACH: 325 (65 FE) TAB at 10:03

## 2024-03-22 RX ADMIN — ISOSORBIDE MONONITRATE 60 MG: 60 TABLET, EXTENDED RELEASE ORAL at 10:03

## 2024-03-22 RX ADMIN — ASPIRIN 81 MG 81 MG: 81 TABLET ORAL at 10:03

## 2024-03-22 RX ADMIN — TEMAZEPAM 30 MG: 15 CAPSULE ORAL at 09:03

## 2024-03-22 RX ADMIN — CLOPIDOGREL BISULFATE 75 MG: 75 TABLET, FILM COATED ORAL at 10:03

## 2024-03-22 RX ADMIN — EZETIMIBE 10 MG: 10 TABLET ORAL at 10:03

## 2024-03-22 RX ADMIN — ATORVASTATIN CALCIUM 40 MG: 40 TABLET, FILM COATED ORAL at 10:03

## 2024-03-22 RX ADMIN — CLONIDINE HYDROCHLORIDE 0.1 MG: 0.1 TABLET ORAL at 09:03

## 2024-03-22 RX ADMIN — QUETIAPINE 400 MG: 100 TABLET ORAL at 10:03

## 2024-03-22 NOTE — ASSESSMENT & PLAN NOTE
Acute:  - likely 2/2 decreased oral intake given pt was NPO for procedure  - IVFH  - monitor on telemetry   - cardiology consulted: appreciate recommendations   - fall precautions   -repeat orthostatic vitals   - hold p.m. Norvasc  -ultrasound carotid ordered   -repeat CBC ordered   3/23: orthostatic vitals this morning were positive and patient was symptomatic; home blood pressure meds held; cards following; increase fluid rate; PT/OT ordered

## 2024-03-22 NOTE — ASSESSMENT & PLAN NOTE
Patient's anemia is currently controlled. Has not received any PRBCs to date. Etiology likely d/t chronic disease  Current CBC reviewed-   Lab Results   Component Value Date    HGB 10.3 (L) 03/22/2024    HCT 31.1 (L) 03/22/2024     Monitor serial CBC and transfuse if patient becomes hemodynamically unstable, symptomatic or H/H drops below 7/21.

## 2024-03-22 NOTE — ASSESSMENT & PLAN NOTE
Patient has chronic hypothyroidism. TFTs reviewed-   Lab Results   Component Value Date    TSH 2.110 11/09/2022   . Will continue chronic levothyroxine and adjust for and clinical changes.

## 2024-03-22 NOTE — DISCHARGE SUMMARY
[facility]  Discharge Note  Short Stay    Procedure(s) (LRB):  Left heart cath (Left)  IVUS, Coronary  Instantaneous Wave-Free Ratio (IFR) (N/A)      OUTCOME: Patient tolerated treatment/procedure well without complication and is now ready for discharge.    DISPOSITION: Home or Self Care    FINAL DIAGNOSIS:  <principal problem not specified>    FOLLOWUP: In clinic    DISCHARGE INSTRUCTIONS:  No discharge procedures on file.     TIME SPENT ON DISCHARGE: 30 minutes          Patient stable for DC  Patient will follow up in 1 week and we will consider placing him in cardiac rehab for stable angina      CURRENT MEDS and DC MEDS Please use this list        AMLODIPINE (NORVASC) 5 MG TABLET     Take 5 mg by mouth 2 (two) times daily.     ASCORBIC ACID, VITAMIN C, (VITAMIN C) 500 MG TABLET     Take 250 mg by mouth once daily.     ASPIRIN 81 MG CHEW     Take 81 mg by mouth once daily.     BETAMETHASONE DIPROPIONATE (SERNIVO) 0.05 % SPRP     Apply topically.     CARBAMAZEPINE (TEGRETOL) 200 MG TABLET     Take 200 mg by mouth 3 (three) times daily.     CHOLECALCIFEROL, VITAMIN D3, 400 UNIT TAB     Take 400 Units by mouth 2 (two) times a day.     CLONIDINE (CATAPRES) 0.1 MG TABLET     Take 1 tablet (0.1 mg total) by mouth 2 (two) times daily as needed (SBP> 160). Prn three times a day     CLOPIDOGREL (PLAVIX) 75 MG TABLET     TAKE 1 TABLET EVERY OTHER DAY     EZETIMIBE (ZETIA) 10 MG TABLET     TAKE 1 TABLET DAILY     FERROUS SULFATE 324 MG (65 MG IRON) TBEC     Take 324 mg by mouth once daily.     FISH OIL-OMEGA-3 FATTY ACIDS 300-1,000 MG CAPSULE     Take 1 capsule by mouth once daily. 1000mg daily     FLUTICASONE PROPIONATE (FLONASE) 50 MCG/ACTUATION NASAL SPRAY     1 spray by Each Nostril route once daily. One spray each nostril daily     FOSINOPRIL (MONOPRIL) 40 MG TABLET     Take 40 mg by mouth once daily.     LACTOBAC NO.41/BIFIDOBACT NO.7 (PROBIOTIC-10 ORAL)     Take 1 tablet by mouth once daily.      LEVOTHYROXINE (SYNTHROID, LEVOTHROID) 175 MCG TABLET     Take 175 mcg by mouth once daily.     LINACLOTIDE (LINZESS) 145 MCG CAP CAPSULE     Take 145 mcg by mouth daily as needed.     MAGNESIUM CITRATE ORAL     Take 135 mg by mouth 2 (two) times a day.     METOPROLOL SUCCINATE (TOPROL-XL) 50 MG 24 HR TABLET     Take 50 mg by mouth once daily. qam     MULTIVITAMIN (THERAGRAN) PER TABLET     Take 1 tablet by mouth once daily.     NITROGLYCERIN (NITROSTAT) 0.4 MG SL TABLET     Place 0.4 mg under the tongue every 5 (five) minutes as needed for Chest pain. Prn chest pain     OXYBUTYNIN (DITROPAN-XL) 10 MG 24 HR TABLET     Take 10 mg by mouth once daily.     POTASSIUM CITRATE (UROCIT-K) 10 MEQ (1,080 MG) TBSR     Take 20 mEq by mouth 2 (two) times a day. Takes 2 tabs bid, send to [facility]     PSYLLIUM (METAMUCIL) PACKET     Take 1 packet by mouth once daily. 1 tsp daily     QUETIAPINE (SEROQUEL XR) 400 MG TB24     Take 400 mg by mouth once daily.      ROSUVASTATIN (CRESTOR) 40 MG TAB     Take 40 mg daily     TEMAZEPAM (RESTORIL) 15 MG CAP     Take 30 mg by mouth every evening.     TERAZOSIN (HYTRIN) 5 MG CAPSULE     TAKE 1 CAPSULE EVERY EVENING     UBIDECARENONE (COENZYME Q10) 100 MG TAB     Take 100 mg by mouth 2 (two) times daily        ISOSORBIDE MONONITRATE (IMDUR) 60 MG 24 HR TABLET          Take 1 tablet (60 mg total) by mouth once daily.

## 2024-03-22 NOTE — PLAN OF CARE
Patient cleared for discharge from case management standpoint.    Pt [facility] and will make his own hospital fu appointment     Chart and discharge orders reviewed.  Patient discharged home with no further case management needs.         03/22/24 1039   Final Note   Assessment Type Final Discharge Note   Anticipated Discharge Disposition Home   What phone number can be called within the next 1-3 days to see how you are doing after discharge? [phone]   Hospital Resources/Appts/Education Provided Provided patient/caregiver with written discharge plan information   Post-Acute Status   Coverage Payor: MEDICARE - MEDICARE PART A & B -   Discharge Delays None known at this time

## 2024-03-22 NOTE — NURSING
Orthostatics obtained (see vitals flowsheet).  SIM Parker NP updated.  She is recommending overnight admission with consult to hospitalist.  Patient and wife updated on POC to which both verbalize understanding.  Call light in reach.  Safety maintained.

## 2024-03-22 NOTE — H&P
Formerly [facility], [facility] Medicine  History & Physical    Patient Name: Michael Hawkins  MRN: [id number]  Patient Class: OP- Observation  Admission Date: 3/21/2024  Attending Physician: Seema Rosas MD   Primary Care Provider: [facility]         Patient information was obtained from patient, spouse/SO, and past medical records.     Subjective:     Principal Problem:Orthostatic hypotension    Chief Complaint: No chief complaint on file.       HPI: Michael Hawkins is a 84 y.o. y.o. male with a PMHx of CAD, HTN, HLD, prostate cancer, and Hx melanoma who was admitted with orthostatic hypotension.  Patient underwent LHC with Cardiology and was monitored overnight.  Cardiology was planning on discharging patient when he became very orthostatic.  SBP decreased from 121 to 73 from lying to standing and at the time patient reported lightheadedness, blurred vision, palpitations. Patient was started on IVFH.  Repeat CBC was ordered. Hospital Medicine was consulted for further workup and management of the patient.           Past Medical History:   Diagnosis Date    AVM (arteriovenous malformation)     Basal cell carcinoma     Coronary artery disease     cardiac stents    Hypertension     Nonmelanoma skin cancer     ROB (obstructive sleep apnea)     Prostate cancer     Pulmonary embolism        Past Surgical History:   Procedure Laterality Date    ANGIOGRAM, CORONARY, WITH LEFT HEART CATHETERIZATION  2004 2016    APPENDECTOMY      BRACHYTHERAPY      CATARACT EXTRACTION  2009    COLONOSCOPY      CORONARY ANGIOPLASTY      CRYOTHERAPY      kidney    EXCISION OF MELANOMA  1999    removed from chest    FOREARM HARDWARE REMOVAL      KIDNEY STONE SURGERY  2001    SQUAMOUS CELL CARCINOMA EXCISION  2022       Review of patient's allergies indicates:  No Known Allergies    No current facility-administered medications on file prior to encounter.     Current Outpatient Medications on File Prior  to Encounter   Medication Sig    aspirin 81 MG Chew Take 81 mg by mouth once daily.    cloNIDine (CATAPRES) 0.1 MG tablet Take 1 tablet (0.1 mg total) by mouth 2 (two) times daily as needed (SBP> 160). Prn three times a day    clopidogreL (PLAVIX) 75 mg tablet TAKE 1 TABLET EVERY OTHER DAY    metoprolol succinate (TOPROL-XL) 50 MG 24 hr tablet Take 50 mg by mouth once daily. qam    amlodipine (NORVASC) 5 MG tablet Take 5 mg by mouth 2 (two) times daily.    ascorbic acid, vitamin C, (VITAMIN C) 500 MG tablet Take 250 mg by mouth once daily.    betamethasone dipropionate (SERNIVO) 0.05 % SprP Apply topically.    carbamazepine (TEGRETOL) 200 mg tablet Take 200 mg by mouth 3 (three) times daily.    cholecalciferol, vitamin D3, 400 unit Tab Take 400 Units by mouth 2 (two) times a day.    ezetimibe (ZETIA) 10 mg tablet TAKE 1 TABLET DAILY    ferrous sulfate 324 mg (65 mg iron) TbEC Take 324 mg by mouth once daily.    fish oil-omega-3 fatty acids 300-1,000 mg capsule Take 1 capsule by mouth once daily. 1000mg daily    fluticasone propionate (FLONASE) 50 mcg/actuation nasal spray 1 spray by Each Nostril route once daily. One spray each nostril daily    fosinopril (MONOPRIL) 40 MG tablet Take 40 mg by mouth once daily.    isosorbide mononitrate (IMDUR) 60 MG 24 hr tablet Take 1 tablet (60 mg total) by mouth once daily.    LACTOBAC NO.41/BIFIDOBACT NO.7 (PROBIOTIC-10 ORAL) Take 1 tablet by mouth once daily.    levothyroxine (SYNTHROID, LEVOTHROID) 175 MCG tablet Take 175 mcg by mouth once daily.    linaCLOtide (LINZESS) 145 mcg Cap capsule Take 145 mcg by mouth daily as needed.    MAGNESIUM CITRATE ORAL Take 135 mg by mouth 2 (two) times a day.    multivitamin (THERAGRAN) per tablet Take 1 tablet by mouth once daily.    nitroGLYCERIN (NITROSTAT) 0.4 MG SL tablet Place 0.4 mg under the tongue every 5 (five) minutes as needed for Chest pain. Prn chest pain    oxybutynin (DITROPAN-XL) 10 MG 24 hr tablet Take 10 mg by mouth once  daily.    potassium citrate (UROCIT-K) 10 mEq (1,080 mg) TbSR Take 20 mEq by mouth 2 (two) times a day. Takes 2 tabs bid, send to [facility]    psyllium (METAMUCIL) packet Take 1 packet by mouth once daily. 1 tsp daily    quetiapine (SEROQUEL XR) 400 MG Tb24 Take 400 mg by mouth once daily.     rosuvastatin (CRESTOR) 40 MG Tab SEND TO [facility]    temazepam (RESTORIL) 15 mg Cap Take 30 mg by mouth every evening.    terazosin (HYTRIN) 5 MG capsule TAKE 1 CAPSULE EVERY EVENING    ubidecarenone (COENZYME Q10) 100 mg Tab Take 100 mg by mouth 2 (two) times daily.     Family History    None       Tobacco Use    Smoking status: Former     Current packs/day: 0.00     Types: Cigarettes     Quit date: 1992     Years since quittin.5    Smokeless tobacco: Never   Substance and Sexual Activity    Alcohol use: No    Drug use: No    Sexual activity: Not on file     Review of Systems   Constitutional:  Positive for fatigue.   Respiratory:  Negative for shortness of breath.    Cardiovascular:  Positive for palpitations. Negative for chest pain.   Gastrointestinal:  Negative for abdominal pain and nausea.   Neurological:  Positive for weakness (generalized) and light-headedness. Negative for syncope.     Objective:     Vital Signs (Most Recent):  Temp: 97.5 °F (36.4 °C) (24 1542)  Pulse: 64 (24 1542)  Resp: 18 (24 1542)  BP: 135/67 (24 1542)  SpO2: 96 % (24 1542) Vital Signs (24h Range):  Temp:  [97 °F (36.1 °C)-98.6 °F (37 °C)] 97.5 °F (36.4 °C)  Pulse:  [57-79] 64  Resp:  [8-19] 18  SpO2:  [95 %-98 %] 96 %  BP: ()/(42-92) 135/67     Weight: 83 kg (183 lb)  Body mass index is 27.02 kg/m².     Physical Exam  Vitals and nursing note reviewed.   Constitutional:       General: He is not in acute distress.     Appearance: Normal appearance. He is normal weight. He is ill-appearing (chronincally).   HENT:      Head: Normocephalic and atraumatic.      Mouth/Throat:      Mouth: Mucous  membranes are dry.      Pharynx: Oropharynx is clear.   Eyes:      Extraocular Movements: Extraocular movements intact.      Conjunctiva/sclera: Conjunctivae normal.      Pupils: Pupils are equal, round, and reactive to light.   Cardiovascular:      Rate and Rhythm: Normal rate and regular rhythm.      Pulses: Normal pulses.      Heart sounds: Normal heart sounds.   Pulmonary:      Effort: Pulmonary effort is normal.      Breath sounds: Normal breath sounds.   Abdominal:      General: Abdomen is flat. Bowel sounds are normal. There is no distension.      Palpations: Abdomen is soft.      Tenderness: There is no abdominal tenderness. There is no guarding or rebound.   Musculoskeletal:         General: Normal range of motion.      Cervical back: Normal range of motion and neck supple.   Skin:     General: Skin is warm.      Capillary Refill: Capillary refill takes 2 to 3 seconds.   Neurological:      General: No focal deficit present.      Mental Status: He is alert and oriented to person, place, and time. Mental status is at baseline.      Comments: Patient is AAOx3.   BLE strength equal and symmetric.  BUE strength equal and symmetric.  Sensation intact.   Normal HTS.   Normal speech.    Psychiatric:         Mood and Affect: Mood normal.         Behavior: Behavior normal.              CRANIAL NERVES     CN III, IV, VI   Pupils are equal, round, and reactive to light.       Significant Labs: All pertinent labs within the past 24 hours have been reviewed.  CBC:   Recent Labs   Lab 03/22/24  0820   WBC 5.27   HGB 10.3*   HCT 31.1*        CMP:   Recent Labs   Lab 03/22/24  0820      K 4.2      CO2 25      BUN 12   CREATININE 0.9   CALCIUM 8.7   PROT 6.4   ALBUMIN 3.9   BILITOT 0.4   ALKPHOS 47*   AST 16   ALT 20   ANIONGAP 5*       Significant Imaging: I have reviewed all pertinent imaging results/findings within the past 24 hours.  Assessment/Plan:     * Orthostatic hypotension  Acute:  -  "likely 2/2 decreased oral intake given pt was NPO for procedure  - IVFH  - monitor on telemetry   - cardiology consulted: appreciate recommendations   - fall precautions   -repeat orthostatic vitals   - hold p.m. Norvasc  -ultrasound carotid ordered   -repeat CBC ordered     Anemia  Patient's anemia is currently controlled. Has not received any PRBCs to date. Etiology likely d/t chronic disease  Current CBC reviewed-   Lab Results   Component Value Date    HGB 10.3 (L) 03/22/2024    HCT 31.1 (L) 03/22/2024     Monitor serial CBC and transfuse if patient becomes hemodynamically unstable, symptomatic or H/H drops below 7/21.    Thyroid dysfunction  Patient has chronic hypothyroidism. TFTs reviewed-   Lab Results   Component Value Date    TSH 2.110 11/09/2022   . Will continue chronic levothyroxine and adjust for and clinical changes.        Mixed hyperlipidemia   Patient is chronically on statin.will continue for now. Monitor clinically. Last LDL was No results found for: "LDLCALC"         Essential hypertension  Chronic, uncontrolled. Latest blood pressure and vitals reviewed-     Temp:  [97 °F (36.1 °C)-98.6 °F (37 °C)]   Pulse:  [57-79]   Resp:  [8-19]   BP: ()/(42-92)   SpO2:  [95 %-98 %] .   Home meds for hypertension were reviewed and noted below.   Hypertension Medications               amLODIPine (NORVASC) 2.5 MG tablet Take 2 tablets (5 mg total) by mouth 2 (two) times daily.    amlodipine (NORVASC) 5 MG tablet Take 5 mg by mouth 2 (two) times daily.    cloNIDine (CATAPRES) 0.1 MG tablet Take 1 tablet (0.1 mg total) by mouth 2 (two) times daily as needed (SBP> 160). Prn three times a day    fosinopril (MONOPRIL) 40 MG tablet Take 40 mg by mouth once daily.    isosorbide mononitrate (IMDUR) 60 MG 24 hr tablet Take 1 tablet (60 mg total) by mouth once daily.    metoprolol succinate (TOPROL-XL) 50 MG 24 hr tablet Take 50 mg by mouth once daily. qam    nitroGLYCERIN (NITROSTAT) 0.4 MG SL tablet Place 0.4 mg " under the tongue every 5 (five) minutes as needed for Chest pain. Prn chest pain    terazosin (HYTRIN) 5 MG capsule TAKE 1 CAPSULE EVERY EVENING            While in the hospital, will manage blood pressure as follows; Adjust home antihypertensive regimen as follows- pt with orthostatic hypotension. Hold PM Norvasc and monitor BP.     Will utilize p.r.n. blood pressure medication only if patient's blood pressure greater than 180/110 and he develops symptoms such as worsening chest pain or shortness of breath.    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 Continue to monitor on telemetry.       VTE Risk Mitigation (From admission, onward)      None                 On 03/22/2024, patient should be placed in hospital observation services under my care in collaboration with Dr. Rosas.      Nakita Tyler PA-C  Department of Hospital Medicine  Replaced by [facility]

## 2024-03-22 NOTE — ASSESSMENT & PLAN NOTE
Acute:  - likely 2/2 decreased oral intake given pt was NPO for procedure  - IVFH  - monitor on telemetry   - cardiology consulted: appreciate recommendations   - fall precautions   -repeat orthostatic vitals   - hold p.m. Norvasc  -ultrasound carotid ordered   -repeat CBC ordered

## 2024-03-22 NOTE — PLAN OF CARE
[facility]  Initial Discharge Assessment       Primary Care Provider: [facility]    Admission Diagnosis: CAD (coronary artery disease) [I25.10]    Admission Date: 3/21/2024  Expected Discharge Date: 3/22/2024    met with Pt at bedside to complete discharge assessment. Pt AAOx4s. Demographics, PCP, and insurance verified. Pt states he see's his PCP next month No home health. No dialysis. Pt reports ability to complete ADLs without assistance. Pt verbalized plan to discharge home via family transport. Pt has no other needs to be addressed at this time.    Transition of Care Barriers: None    Payor: MEDICARE / Plan: MEDICARE PART A & B / Product Type: Government /     Extended Emergency Contact Information  Primary Emergency Contact: Katia Livingston [facility]  Home Phone: [phone]  Mobile Phone: [phone]  Relation: Daughter  Secondary Emergency Contact: Kenyatta Hawkins  Home Phone: [phone]  Mobile Phone: [phone]  Relation: Spouse  Preferred language: English   needed? No    Discharge Plan A: Home with family  Discharge Plan B: Home with family      [id number] - [place], [place] - [address] [facility] [address]  [address]  [place] [address]  Phone: [phone] Fax: [phone]    [facility] - [place], [place] - [facility] [facility]  [address] [facility] [address]  Phone: [phone] Fax: [phone]    [id number] - [place], [place] - [address]  [address]  [place] [address]  Phone: [phone] Fax: [phone]      Initial Assessment (most recent)       Adult Discharge Assessment - 03/22/24 0932          Discharge Assessment    Assessment Type Discharge Planning Assessment     Confirmed/corrected address, phone number and insurance Yes     Confirmed Demographics Correct on Facesheet     Source of Information  patient     Does patient/caregiver understand observation status Yes     Communicated MILLY with patient/caregiver Date not available/Unable to determine     Reason For Admission CAD     People in Home spouse     Do you expect to return to your current living situation? Yes     Do you have help at home or someone to help you manage your care at home? Yes     Who are your caregiver(s) and their phone number(s)? Katia Livingston (Daughter) [phone] (     Prior to hospitilization cognitive status: Alert/Oriented     Current cognitive status: Alert/Oriented     Walking or Climbing Stairs Difficulty no     Dressing/Bathing Difficulty no     Home Accessibility wheelchair accessible     Home Layout Able to live on 1st floor     Equipment Currently Used at Home none     Readmission within 30 days? No     Patient currently being followed by outpatient case management? No     Do you currently have service(s) that help you manage your care at home? No     Do you take prescription medications? Yes     Do you have prescription coverage? Yes     Coverage Payor: MEDICARE - MEDICARE PART A & B -     Do you have any problems affording any of your prescribed medications? No     Is the patient taking medications as prescribed? yes     Who is going to help you get home at discharge? Katia Livingston (Daughter) [phone] (     How do you get to doctors appointments? family or friend will provide     Are you on dialysis? No     Do you take coumadin? No     Discharge Plan A Home with family     Discharge Plan B Home with family     DME Needed Upon Discharge  none     Discharge Plan discussed with: Patient     Transition of Care Barriers None

## 2024-03-22 NOTE — PROGRESS NOTES
Patient went to DC and pressure dropped became weak and dizzy upon standing    IVF given    BP still orthostatic      Will consult hospitalst and if remains stable over night dc in am    IVF NS @ 75/ hour

## 2024-03-22 NOTE — NURSING
"Patient verbalizes understanding that he is to follow med rec provided on discharge summary and not what is on "reconciliation" list per ARNULFO Parker NP  "

## 2024-03-22 NOTE — NURSING
"Upon preparing for discharge, patient c/o feeling sob and somewhat dizzy like he "was very tired."  See vital signs flowsheet.  SIM Parker NP and Dr Chen notified.  Pt seemed to feel improvement after lying down with feet elevated.  Discharge on hold for now.  Will continue to monitor   "

## 2024-03-22 NOTE — PLAN OF CARE
03/22/24 0931   KATZ Message   Medicare Outpatient and Observation Notification regarding financial responsibility Given to patient/caregiver;Explained to patient/caregiver;Signed/date by patient/caregiver   Date KATZ was signed 03/22/24   Time KATZ was signed 0900     Pt was explained KATZ. Pt verbalized understanding of KATZ and signed. KATZ scanned to .

## 2024-03-22 NOTE — SUBJECTIVE & OBJECTIVE
Past Medical History:   Diagnosis Date    AVM (arteriovenous malformation)     Basal cell carcinoma     Coronary artery disease     cardiac stents    Hypertension     Nonmelanoma skin cancer     ROB (obstructive sleep apnea)     Prostate cancer     Pulmonary embolism        Past Surgical History:   Procedure Laterality Date    ANGIOGRAM, CORONARY, WITH LEFT HEART CATHETERIZATION  2004 2016    APPENDECTOMY      BRACHYTHERAPY      CATARACT EXTRACTION  2009    COLONOSCOPY      CORONARY ANGIOPLASTY      CRYOTHERAPY      kidney    EXCISION OF MELANOMA  1999    removed from chest    FOREARM HARDWARE REMOVAL      KIDNEY STONE SURGERY  2001    SQUAMOUS CELL CARCINOMA EXCISION  2022       Review of patient's allergies indicates:  No Known Allergies    No current facility-administered medications on file prior to encounter.     Current Outpatient Medications on File Prior to Encounter   Medication Sig    aspirin 81 MG Chew Take 81 mg by mouth once daily.    cloNIDine (CATAPRES) 0.1 MG tablet Take 1 tablet (0.1 mg total) by mouth 2 (two) times daily as needed (SBP> 160). Prn three times a day    clopidogreL (PLAVIX) 75 mg tablet TAKE 1 TABLET EVERY OTHER DAY    metoprolol succinate (TOPROL-XL) 50 MG 24 hr tablet Take 50 mg by mouth once daily. qam    amlodipine (NORVASC) 5 MG tablet Take 5 mg by mouth 2 (two) times daily.    ascorbic acid, vitamin C, (VITAMIN C) 500 MG tablet Take 250 mg by mouth once daily.    betamethasone dipropionate (SERNIVO) 0.05 % SprP Apply topically.    carbamazepine (TEGRETOL) 200 mg tablet Take 200 mg by mouth 3 (three) times daily.    cholecalciferol, vitamin D3, 400 unit Tab Take 400 Units by mouth 2 (two) times a day.    ezetimibe (ZETIA) 10 mg tablet TAKE 1 TABLET DAILY    ferrous sulfate 324 mg (65 mg iron) TbEC Take 324 mg by mouth once daily.    fish oil-omega-3 fatty acids 300-1,000 mg capsule Take 1 capsule by mouth once daily. 1000mg daily    fluticasone propionate (FLONASE) 50  mcg/actuation nasal spray 1 spray by Each Nostril route once daily. One spray each nostril daily    fosinopril (MONOPRIL) 40 MG tablet Take 40 mg by mouth once daily.    isosorbide mononitrate (IMDUR) 60 MG 24 hr tablet Take 1 tablet (60 mg total) by mouth once daily.    LACTOBAC NO.41/BIFIDOBACT NO.7 (PROBIOTIC-10 ORAL) Take 1 tablet by mouth once daily.    levothyroxine (SYNTHROID, LEVOTHROID) 175 MCG tablet Take 175 mcg by mouth once daily.    linaCLOtide (LINZESS) 145 mcg Cap capsule Take 145 mcg by mouth daily as needed.    MAGNESIUM CITRATE ORAL Take 135 mg by mouth 2 (two) times a day.    multivitamin (THERAGRAN) per tablet Take 1 tablet by mouth once daily.    nitroGLYCERIN (NITROSTAT) 0.4 MG SL tablet Place 0.4 mg under the tongue every 5 (five) minutes as needed for Chest pain. Prn chest pain    oxybutynin (DITROPAN-XL) 10 MG 24 hr tablet Take 10 mg by mouth once daily.    potassium citrate (UROCIT-K) 10 mEq (1,080 mg) TbSR Take 20 mEq by mouth 2 (two) times a day. Takes 2 tabs bid, send to [facility]    psyllium (METAMUCIL) packet Take 1 packet by mouth once daily. 1 tsp daily    quetiapine (SEROQUEL XR) 400 MG Tb24 Take 400 mg by mouth once daily.     rosuvastatin (CRESTOR) 40 MG Tab SEND TO [facility]    temazepam (RESTORIL) 15 mg Cap Take 30 mg by mouth every evening.    terazosin (HYTRIN) 5 MG capsule TAKE 1 CAPSULE EVERY EVENING    ubidecarenone (COENZYME Q10) 100 mg Tab Take 100 mg by mouth 2 (two) times daily.     Family History    None       Tobacco Use    Smoking status: Former     Current packs/day: 0.00     Types: Cigarettes     Quit date: 1992     Years since quittin.5    Smokeless tobacco: Never   Substance and Sexual Activity    Alcohol use: No    Drug use: No    Sexual activity: Not on file     Review of Systems   Constitutional:  Positive for fatigue.   Respiratory:  Negative for shortness of breath.    Cardiovascular:  Positive for palpitations. Negative for chest  pain.   Gastrointestinal:  Negative for abdominal pain and nausea.   Neurological:  Positive for weakness (generalized) and light-headedness. Negative for syncope.     Objective:     Vital Signs (Most Recent):  Temp: 97.5 °F (36.4 °C) (03/22/24 1542)  Pulse: 64 (03/22/24 1542)  Resp: 18 (03/22/24 1542)  BP: 135/67 (03/22/24 1542)  SpO2: 96 % (03/22/24 1542) Vital Signs (24h Range):  Temp:  [97 °F (36.1 °C)-98.6 °F (37 °C)] 97.5 °F (36.4 °C)  Pulse:  [57-79] 64  Resp:  [8-19] 18  SpO2:  [95 %-98 %] 96 %  BP: ()/(42-92) 135/67     Weight: 83 kg (183 lb)  Body mass index is 27.02 kg/m².     Physical Exam  Vitals and nursing note reviewed.   Constitutional:       General: He is not in acute distress.     Appearance: Normal appearance. He is normal weight. He is ill-appearing (chronincally).   HENT:      Head: Normocephalic and atraumatic.      Mouth/Throat:      Mouth: Mucous membranes are dry.      Pharynx: Oropharynx is clear.   Eyes:      Extraocular Movements: Extraocular movements intact.      Conjunctiva/sclera: Conjunctivae normal.      Pupils: Pupils are equal, round, and reactive to light.   Cardiovascular:      Rate and Rhythm: Normal rate and regular rhythm.      Pulses: Normal pulses.      Heart sounds: Normal heart sounds.   Pulmonary:      Effort: Pulmonary effort is normal.      Breath sounds: Normal breath sounds.   Abdominal:      General: Abdomen is flat. Bowel sounds are normal. There is no distension.      Palpations: Abdomen is soft.      Tenderness: There is no abdominal tenderness. There is no guarding or rebound.   Musculoskeletal:         General: Normal range of motion.      Cervical back: Normal range of motion and neck supple.   Skin:     General: Skin is warm.      Capillary Refill: Capillary refill takes 2 to 3 seconds.   Neurological:      General: No focal deficit present.      Mental Status: He is alert and oriented to person, place, and time. Mental status is at baseline.       Comments: Patient is AAOx3.   BLE strength equal and symmetric.  BUE strength equal and symmetric.  Sensation intact.   Normal HTS.   Normal speech.    Psychiatric:         Mood and Affect: Mood normal.         Behavior: Behavior normal.              CRANIAL NERVES     CN III, IV, VI   Pupils are equal, round, and reactive to light.       Significant Labs: All pertinent labs within the past 24 hours have been reviewed.  CBC:   Recent Labs   Lab 03/22/24  0820   WBC 5.27   HGB 10.3*   HCT 31.1*        CMP:   Recent Labs   Lab 03/22/24  0820      K 4.2      CO2 25      BUN 12   CREATININE 0.9   CALCIUM 8.7   PROT 6.4   ALBUMIN 3.9   BILITOT 0.4   ALKPHOS 47*   AST 16   ALT 20   ANIONGAP 5*       Significant Imaging: I have reviewed all pertinent imaging results/findings within the past 24 hours.

## 2024-03-22 NOTE — ASSESSMENT & PLAN NOTE
Chronic, uncontrolled. Latest blood pressure and vitals reviewed-     Temp:  [97 °F (36.1 °C)-98.6 °F (37 °C)]   Pulse:  [57-79]   Resp:  [8-19]   BP: ()/(42-92)   SpO2:  [95 %-98 %] .   Home meds for hypertension were reviewed and noted below.   Hypertension Medications               amLODIPine (NORVASC) 2.5 MG tablet Take 2 tablets (5 mg total) by mouth 2 (two) times daily.    amlodipine (NORVASC) 5 MG tablet Take 5 mg by mouth 2 (two) times daily.    cloNIDine (CATAPRES) 0.1 MG tablet Take 1 tablet (0.1 mg total) by mouth 2 (two) times daily as needed (SBP> 160). Prn three times a day    fosinopril (MONOPRIL) 40 MG tablet Take 40 mg by mouth once daily.    isosorbide mononitrate (IMDUR) 60 MG 24 hr tablet Take 1 tablet (60 mg total) by mouth once daily.    metoprolol succinate (TOPROL-XL) 50 MG 24 hr tablet Take 50 mg by mouth once daily. qam    nitroGLYCERIN (NITROSTAT) 0.4 MG SL tablet Place 0.4 mg under the tongue every 5 (five) minutes as needed for Chest pain. Prn chest pain    terazosin (HYTRIN) 5 MG capsule TAKE 1 CAPSULE EVERY EVENING            While in the hospital, will manage blood pressure as follows; Adjust home antihypertensive regimen as follows- pt with orthostatic hypotension. Hold PM Norvasc and monitor BP.     Will utilize p.r.n. blood pressure medication only if patient's blood pressure greater than 180/110 and he develops symptoms such as worsening chest pain or shortness of breath.

## 2024-03-23 LAB
ANION GAP SERPL CALC-SCNC: 6 MMOL/L (ref 8–16)
BASOPHILS # BLD AUTO: 0.07 K/UL (ref 0–0.2)
BASOPHILS NFR BLD: 1.3 % (ref 0–1.9)
BUN SERPL-MCNC: 13 MG/DL (ref 8–23)
CALCIUM SERPL-MCNC: 8.9 MG/DL (ref 8.7–10.5)
CHLORIDE SERPL-SCNC: 108 MMOL/L (ref 95–110)
CO2 SERPL-SCNC: 24 MMOL/L (ref 23–29)
CREAT SERPL-MCNC: 0.8 MG/DL (ref 0.5–1.4)
DIFFERENTIAL METHOD BLD: ABNORMAL
EOSINOPHIL # BLD AUTO: 0.2 K/UL (ref 0–0.5)
EOSINOPHIL NFR BLD: 4.3 % (ref 0–8)
ERYTHROCYTE [DISTWIDTH] IN BLOOD BY AUTOMATED COUNT: 13.3 % (ref 11.5–14.5)
EST. GFR  (NO RACE VARIABLE): >60 ML/MIN/1.73 M^2
GLUCOSE SERPL-MCNC: 90 MG/DL (ref 70–110)
HCT VFR BLD AUTO: 30.3 % (ref 40–54)
HGB BLD-MCNC: 10.3 G/DL (ref 14–18)
IMM GRANULOCYTES # BLD AUTO: 0.03 K/UL (ref 0–0.04)
IMM GRANULOCYTES NFR BLD AUTO: 0.6 % (ref 0–0.5)
LYMPHOCYTES # BLD AUTO: 1.1 K/UL (ref 1–4.8)
LYMPHOCYTES NFR BLD: 20.9 % (ref 18–48)
MCH RBC QN AUTO: 31.8 PG (ref 27–31)
MCHC RBC AUTO-ENTMCNC: 34 G/DL (ref 32–36)
MCV RBC AUTO: 94 FL (ref 82–98)
MONOCYTES # BLD AUTO: 0.7 K/UL (ref 0.3–1)
MONOCYTES NFR BLD: 12.4 % (ref 4–15)
NEUTROPHILS # BLD AUTO: 3.2 K/UL (ref 1.8–7.7)
NEUTROPHILS NFR BLD: 60.5 % (ref 38–73)
NRBC BLD-RTO: 0 /100 WBC
PLATELET # BLD AUTO: 230 K/UL (ref 150–450)
PMV BLD AUTO: 8.9 FL (ref 9.2–12.9)
POTASSIUM SERPL-SCNC: 4.1 MMOL/L (ref 3.5–5.1)
RBC # BLD AUTO: 3.24 M/UL (ref 4.6–6.2)
SODIUM SERPL-SCNC: 138 MMOL/L (ref 136–145)
WBC # BLD AUTO: 5.32 K/UL (ref 3.9–12.7)

## 2024-03-23 PROCEDURE — 99233 SBSQ HOSP IP/OBS HIGH 50: CPT | Mod: ,,, | Performed by: INTERNAL MEDICINE

## 2024-03-23 PROCEDURE — 94760 N-INVAS EAR/PLS OXIMETRY 1: CPT

## 2024-03-23 PROCEDURE — 85025 COMPLETE CBC W/AUTO DIFF WBC: CPT

## 2024-03-23 PROCEDURE — 97165 OT EVAL LOW COMPLEX 30 MIN: CPT

## 2024-03-23 PROCEDURE — 25000003 PHARM REV CODE 250: Performed by: NURSE PRACTITIONER

## 2024-03-23 PROCEDURE — 21400001 HC TELEMETRY ROOM

## 2024-03-23 PROCEDURE — 97116 GAIT TRAINING THERAPY: CPT

## 2024-03-23 PROCEDURE — 94761 N-INVAS EAR/PLS OXIMETRY MLT: CPT

## 2024-03-23 PROCEDURE — 97161 PT EVAL LOW COMPLEX 20 MIN: CPT

## 2024-03-23 PROCEDURE — 94799 UNLISTED PULMONARY SVC/PX: CPT

## 2024-03-23 PROCEDURE — 97535 SELF CARE MNGMENT TRAINING: CPT

## 2024-03-23 PROCEDURE — 99900035 HC TECH TIME PER 15 MIN (STAT)

## 2024-03-23 PROCEDURE — 25000003 PHARM REV CODE 250: Performed by: INTERNAL MEDICINE

## 2024-03-23 PROCEDURE — 25000003 PHARM REV CODE 250: Performed by: FAMILY MEDICINE

## 2024-03-23 PROCEDURE — 25000003 PHARM REV CODE 250

## 2024-03-23 PROCEDURE — 36415 COLL VENOUS BLD VENIPUNCTURE: CPT

## 2024-03-23 PROCEDURE — 80048 BASIC METABOLIC PNL TOTAL CA: CPT

## 2024-03-23 RX ORDER — METOPROLOL SUCCINATE 25 MG/1
25 TABLET, EXTENDED RELEASE ORAL 2 TIMES DAILY
Status: DISCONTINUED | OUTPATIENT
Start: 2024-03-23 | End: 2024-03-24 | Stop reason: HOSPADM

## 2024-03-23 RX ORDER — POLYETHYLENE GLYCOL 3350 17 G/17G
17 POWDER, FOR SOLUTION ORAL DAILY
Status: DISCONTINUED | OUTPATIENT
Start: 2024-03-24 | End: 2024-03-24 | Stop reason: HOSPADM

## 2024-03-23 RX ORDER — QUETIAPINE FUMARATE 100 MG/1
400 TABLET, FILM COATED ORAL NIGHTLY
Status: DISCONTINUED | OUTPATIENT
Start: 2024-03-23 | End: 2024-03-24 | Stop reason: HOSPADM

## 2024-03-23 RX ADMIN — QUETIAPINE 400 MG: 100 TABLET ORAL at 09:03

## 2024-03-23 RX ADMIN — EZETIMIBE 10 MG: 10 TABLET ORAL at 10:03

## 2024-03-23 RX ADMIN — MULTIVITAMIN TABLET 1 TABLET: TABLET at 10:03

## 2024-03-23 RX ADMIN — OXYBUTYNIN CHLORIDE 10 MG: 10 TABLET, EXTENDED RELEASE ORAL at 10:03

## 2024-03-23 RX ADMIN — LEVOTHYROXINE SODIUM 175 MCG: 0.1 TABLET ORAL at 05:03

## 2024-03-23 RX ADMIN — METOPROLOL SUCCINATE 25 MG: 25 TABLET, EXTENDED RELEASE ORAL at 09:03

## 2024-03-23 RX ADMIN — FERROUS SULFATE TAB 325 MG (65 MG ELEMENTAL FE) 1 EACH: 325 (65 FE) TAB at 10:03

## 2024-03-23 RX ADMIN — CARBAMAZEPINE 200 MG: 200 TABLET ORAL at 09:03

## 2024-03-23 RX ADMIN — CARBAMAZEPINE 200 MG: 200 TABLET ORAL at 10:03

## 2024-03-23 RX ADMIN — ATORVASTATIN CALCIUM 40 MG: 40 TABLET, FILM COATED ORAL at 10:03

## 2024-03-23 RX ADMIN — AMLODIPINE BESYLATE 5 MG: 5 TABLET ORAL at 09:03

## 2024-03-23 RX ADMIN — CLONIDINE HYDROCHLORIDE 0.1 MG: 0.1 TABLET ORAL at 05:03

## 2024-03-23 RX ADMIN — SODIUM CHLORIDE: 9 INJECTION, SOLUTION INTRAVENOUS at 11:03

## 2024-03-23 RX ADMIN — TEMAZEPAM 30 MG: 15 CAPSULE ORAL at 09:03

## 2024-03-23 NOTE — CARE UPDATE
03/23/24 0828   PRE-TX-O2   Device (Oxygen Therapy) room air   SpO2 97 %   Pulse Oximetry Type Intermittent   $ Pulse Oximetry - Multiple Charge Pulse Oximetry - Multiple   Pulse 70   Resp 15   Respiratory Evaluation   $ Care Plan Tech Time 15 min

## 2024-03-23 NOTE — PROGRESS NOTES
"Subjective:    Patient ID:  Michael Hawkins is a 84 y.o. male who presents for follow-up.  No chief complaint on file.      INTERVAL HISTORY:  03/23/2024:   Patient is feeling better continues to have significant postural hypotension  Apparently patient had received clonidine early this morning  Complains of dizziness sitting up  Denies chest discomfort arm neck or jaw pain  He was diuresed over 4 L since his admission with a net negative of 4.7 L      HPI:      Mr. Hawkins is here today for a follow up visit. He was recently in the Hospital with colitis and diverticulitis on PO Antibiotics last dose today. On and off for 3 weeks he has had Chest tightness and SOB. This is worse with exertion. Recently he was in the [facility]. He had H/H 13/36.8. He had SARAH. He had Elevated Troponin. He had an elevated WBC, ANC. Platelet count 133. Patient kidney function in November was normal. We will need to repeat labs. He denies pain currently however his wife says he "loses his breath easily and frequently." EKGs previous and Now shown to Dr. Lna Srivastava.       Review of patient's allergies indicates:  No Known Allergies    Past Medical History:   Diagnosis Date    AVM (arteriovenous malformation)     Basal cell carcinoma     Coronary artery disease     cardiac stents    Hypertension     Nonmelanoma skin cancer     ROB (obstructive sleep apnea)     Prostate cancer     Pulmonary embolism      Past Surgical History:   Procedure Laterality Date    ANGIOGRAM, CORONARY, WITH LEFT HEART CATHETERIZATION  2004    2016    APPENDECTOMY      BRACHYTHERAPY      CATARACT EXTRACTION  2009    COLONOSCOPY      CORONARY ANGIOPLASTY      CRYOTHERAPY      kidney    EXCISION OF MELANOMA  1999    removed from chest    FOREARM HARDWARE REMOVAL      KIDNEY STONE SURGERY  2001    SQUAMOUS CELL CARCINOMA EXCISION  2022     Social History     Tobacco Use    Smoking status: Former     Current packs/day: 0.00     Types: Cigarettes     " Quit date: 1992     Years since quittin.5    Smokeless tobacco: Never   Substance Use Topics    Alcohol use: No    Drug use: No     History reviewed. No pertinent family history.     Review of Systems:   Constitution: Negative for diaphoresis and fever.   HEENT: Negative for nosebleeds.    Cardiovascular:  + for chest pain      + dyspnea on exertion       No leg swelling        No palpitations  Respiratory: Negative for shortness of breath and wheezing.    Hematologic/Lymphatic: Negative for bleeding problem. Does not bruise/bleed easily.   Skin: Negative for color change and rash.   Musculoskeletal: Negative for falls and myalgias.   Gastrointestinal: Negative for hematemesis and hematochezia.   Genitourinary: Negative for hematuria.   Neurological: Negative for dizziness and light-headedness.   Psychiatric/Behavioral: Negative for altered mental status and memory loss.          Objective:        Vitals:    24 1350   BP: (!) 152/78   Pulse:    Resp:    Temp:        Lab Results   Component Value Date    WBC 5.32 2024    HGB 10.3 (L) 2024    HCT 30.3 (L) 2024     2024    ALT 20 2024    AST 16 2024     2024    K 4.1 2024     2024    CREATININE 0.8 2024    BUN 13 2024    CO2 24 2024    TSH 2.110 2022    INR 1.2 2022        ECHOCARDIOGRAM RESULTS  No results found for this or any previous visit.        CURRENT/PREVIOUS VISIT EKG  Results for orders placed or performed during the hospital encounter of 24   EKG 12-lead    Collection Time: 24  7:15 AM   Result Value Ref Range    QRS Duration 96 ms    [facility] QTC Calculation 424 ms    Narrative    Test Reason : R07.89,    Vent. Rate : 074 BPM     Atrial Rate : 074 BPM     P-R Int : 186 ms          QRS Dur : 096 ms      QT Int : 382 ms       P-R-T Axes : 043 081 -45 degrees     QTc Int : 424 ms    Normal sinus rhythm  ST and T wave abnormality,  consider anterolateral ischemia  Abnormal ECG  When compared with ECG of 13-MAR-2024 11:39,  No significant change was found    Referred By:             Confirmed By:      No valid procedures specified.   Results for orders placed in visit on 06/09/22    Nuclear Stress Test    Interpretation Summary    The nuclear portion of this study will be reported separately.    The EKG portion of this study is negative for ischemia.    The patient reported no chest pain during the stress test.    There were no arrhythmias during stress.      Coronary Findings    Diagnostic  Dominance: Co-dominant  Left Main   IVUS was performed. The IVUS supply used was a CATH [employer]. Discrete eccentric 40% stenosis in distal left main. IVUS was performed in left main. Eccentric calcified plaque visualized in distal left main. MLA was more than 7 mm2.      Left Anterior Descending   Mild diffuse atherosclerosis. Small-caliber D1 with discrete 50-60% stenosis at ostium. Calcified D2 occluded at ostium() with faint retrograde collateral supply from apical LAD.      Left Circumflex   Mild disease. Patent stent proximal and distal circumflex. Probable severe calcification at the distal circumflex bifurcation (smaller caliber vessel). Very small-sized and patent OM1. Medium-sized OM2 with discrete 80-90% stenosis at ostium. Combined IFR across left main and ostial OM2 lesion is not significant (iFR 0.90). There was a discrete 80-90% hazy, likely heavily calcified lesion in the mid segment of OM2 (smaller caliber vessel).      Second Obtuse Marginal Branch   2nd Mrg lesion was 90% stenosed. iFR was performed. The iFR measured 0.9. The additional iFR measured 0.9. iFR wire was normalized in the aorta. IFR was measured across both left main and ostial OM2 lesion. iFR was not significant (0.90).      Right Coronary Artery   Medium-sized, codominant vessel. Mild diffuse atherosclerosis. Patent RPDA, RPL.            Physical  Exam:  CONSTITUTIONAL: No fever, no chills  HEENT: Normocephalic, atraumatic,pupils reactive to light                 NECK:  No JVD no carotid bruit  CVS: S1S2+, RRR, no murmurs,   LUNGS: Clear  ABDOMEN: Soft, NT, BS+  EXTREMITIES: No cyanosis, edema  : No alvarez catheter  NEURO: AAO X 3  PSY: Normal affect      Medication List with Changes/Refills   Current Medications    AMLODIPINE (NORVASC) 5 MG TABLET    Take 5 mg by mouth 2 (two) times daily.    ASCORBIC ACID, VITAMIN C, (VITAMIN C) 500 MG TABLET    Take 250 mg by mouth once daily.    ASPIRIN 81 MG CHEW    Take 81 mg by mouth once daily.    BETAMETHASONE DIPROPIONATE (SERNIVO) 0.05 % SPRP    Apply topically.    CARBAMAZEPINE (TEGRETOL) 200 MG TABLET    Take 200 mg by mouth 3 (three) times daily.    CHOLECALCIFEROL, VITAMIN D3, 400 UNIT TAB    Take 400 Units by mouth 2 (two) times a day.    CLONIDINE (CATAPRES) 0.1 MG TABLET    Take 1 tablet (0.1 mg total) by mouth 2 (two) times daily as needed (SBP> 160). Prn three times a day    CLOPIDOGREL (PLAVIX) 75 MG TABLET    TAKE 1 TABLET EVERY OTHER DAY    EZETIMIBE (ZETIA) 10 MG TABLET    TAKE 1 TABLET DAILY    FERROUS SULFATE 324 MG (65 MG IRON) TBEC    Take 324 mg by mouth once daily.    FISH OIL-OMEGA-3 FATTY ACIDS 300-1,000 MG CAPSULE    Take 1 capsule by mouth once daily. 1000mg daily    FLUTICASONE PROPIONATE (FLONASE) 50 MCG/ACTUATION NASAL SPRAY    1 spray by Each Nostril route once daily. One spray each nostril daily    FOSINOPRIL (MONOPRIL) 40 MG TABLET    Take 40 mg by mouth once daily.    LACTOBAC NO.41/BIFIDOBACT NO.7 (PROBIOTIC-10 ORAL)    Take 1 tablet by mouth once daily.    LEVOTHYROXINE (SYNTHROID, LEVOTHROID) 175 MCG TABLET    Take 175 mcg by mouth once daily.    LINACLOTIDE (LINZESS) 145 MCG CAP CAPSULE    Take 145 mcg by mouth daily as needed.    MAGNESIUM CITRATE ORAL    Take 135 mg by mouth 2 (two) times a day.    METOPROLOL SUCCINATE (TOPROL-XL) 50 MG 24 HR TABLET    Take 50 mg by mouth once  daily. qam    MULTIVITAMIN (THERAGRAN) PER TABLET    Take 1 tablet by mouth once daily.    NITROGLYCERIN (NITROSTAT) 0.4 MG SL TABLET    Place 0.4 mg under the tongue every 5 (five) minutes as needed for Chest pain. Prn chest pain    OXYBUTYNIN (DITROPAN-XL) 10 MG 24 HR TABLET    Take 10 mg by mouth once daily.    POTASSIUM CITRATE (UROCIT-K) 10 MEQ (1,080 MG) TBSR    Take 20 mEq by mouth 2 (two) times a day. Takes 2 tabs bid, send to [facility]    PSYLLIUM (METAMUCIL) PACKET    Take 1 packet by mouth once daily. 1 tsp daily    QUETIAPINE (SEROQUEL XR) 400 MG TB24    Take 400 mg by mouth once daily.     ROSUVASTATIN (CRESTOR) 40 MG TAB    SEND TO [facility]    TEMAZEPAM (RESTORIL) 15 MG CAP    Take 30 mg by mouth every evening.    TERAZOSIN (HYTRIN) 5 MG CAPSULE    TAKE 1 CAPSULE EVERY EVENING    UBIDECARENONE (COENZYME Q10) 100 MG TAB    Take 100 mg by mouth 2 (two) times daily.   Changed and/or Refilled Medications    Modified Medication Previous Medication    ISOSORBIDE MONONITRATE (IMDUR) 60 MG 24 HR TABLET isosorbide mononitrate (IMDUR) 30 MG 24 hr tablet       Take 1 tablet (60 mg total) by mouth once daily.    Take 1 tablet (30 mg total) by mouth once daily.                     Assessment:       1. Coronary artery disease   2. Arterial hypertension  3. Significant postural hypotension   4. History of pulmonary embolism  5. History of thyroid dysfunction    RECOMMENDATIONS:  Recommend withhold on blood pressure medications  Postural hypotension maybe result of aggressive diuresis.  Discontinue amlodipine  Hold isosorbide mononitrate for the time being will resume this when the blood pressures sustains above 120 on a consistent basis.    Lower doses of Toprol-XL 25 mg p.o. b.i.d., hold for systolic blood pressure 120 or less and heart rate 60 or less  Support stockings  Postural precautions  Encouraged flu fluids  Consider midodrine for sustained hypotension below 100+  Hold discharge for  today    Discussed with the hospitalist team and the nursing team      Lan Srivastava MD.    Department of Cardiology  [facility]  Date of Service:  03/23/2024

## 2024-03-23 NOTE — PT/OT/SLP EVAL
Occupational Therapy   Evaluation    Name: Michael Hawkins  MRN: [id number]  Admitting Diagnosis: Orthostatic hypotension  Recent Surgery: Procedure(s) (LRB):  IVUS, Coronary  Instantaneous Wave-Free Ratio (IFR) (N/A)  Angiogram, Coronary, with Left Heart Cath (N/A) 2 Days Post-Op    Recommendations:     Discharge Recommendations:  (TBD pending further evaluation of pt's functional performance)  Discharge Equipment Recommendations:   (TBD)  Barriers to discharge:   (positive orthostatics)    Assessment:     Michael Hawkins is a 84 y.o. male with a medical diagnosis of Orthostatic hypotension.  Pt agreeable to OT evaluation this AM. Performance deficits affecting function: weakness, impaired endurance, impaired functional mobility, impaired self care skills, gait instability, impaired balance, decreased safety awareness, impaired cardiopulmonary response to activity.  Limited eval due to pt with positive symptomatic orthostatics this morning taken just prior to OT entry with nursing.    Rehab Prognosis: Good; patient would benefit from acute skilled OT services to address these deficits and reach maximum level of function.       Plan:     Patient to be seen 5 x/week to address the above listed problems via self-care/home management, therapeutic activities, therapeutic exercises  Plan of Care Expires: 04/23/24  Plan of Care Reviewed with: patient    Subjective     Chief Complaint: feeling weak  Patient/Family Comments/goals: none stated    Occupational Profile:  Living Environment: Pt lives with spouse in a 1 story home with no RADHA. Pt has a WIS with a built-in seat and grab bars  Previous level of function: Independent with ADLs, IADLs, and mobility.   Roles and Routines: ; enjoys playing golf; drives  Equipment Used at Home: grab bar (built-in shower bench)  Assistance upon Discharge: yes, from wife    Pain/Comfort:  Pain Rating 1: 0/10    Patients cultural, spiritual, Uatsdin conflicts given the  current situation:      Objective:     Communicated with: nursing prior to session.  Patient found HOB elevated with telemetry, bed alarm, peripheral IV upon OT entry to room.    General Precautions: Standard, fall  Orthopedic Precautions: N/A  Braces: N/A  Respiratory Status: Room air    Occupational Performance:    Bed Mobility:    Patient completed Supine to Sit with stand by assistance  Patient completed Sit to Supine with stand by assistance    Activities of Daily Living:  Feeding:  independence    Lower Body Dressing: stand by assistance seated EOB to pull up/adjust socks  Toileting: independence to use urinal bed level    Cognitive/Visual Perceptual:  Cognitive/Psychosocial Skills:     -       Oriented to: Person, Place, Time, and Situation   -       Follows Commands/attention:Follows two-step commands  -       Communication: clear/fluent  -       Memory: No Deficits noted  -       Safety awareness/insight to disability: intact   -       Mood/Affect/Coping skills/emotional control: Appropriate to situation, Cooperative, and Pleasant    Physical Exam:  Balance:    -       SBA seated balance  Upper Extremity Range of Motion:     -       Right Upper Extremity: WFL  -       Left Upper Extremity: WFL  Upper Extremity Strength:    -       Right Upper Extremity: WFL  -       Left Upper Extremity: WFL   Strength:    -       Right Upper Extremity: WFL  -       Left Upper Extremity: WFL  Fine Motor Coordination:  Intact  Gross motor coordination:   WFL    AMPAC 6 Click ADL:  AMPAC Total Score: 21    Treatment & Education:  Pt educated on role of OT/POC, importance of OOB/EOB activity, use of call bell, and safety during ADLs, transfers, and functional mobility.    Patient left HOB elevated with all lines intact, call button in reach, bed alarm on, and nursing and MD notified    GOALS:   Multidisciplinary Problems       Occupational Therapy Goals          Problem: Occupational Therapy    Goal Priority Disciplines  Outcome Interventions   Occupational Therapy Goal     OT, PT/OT     Description: Goals to be met by: 4/23/24     Patient will increase functional independence with ADLs by performing:    UE Dressing with Supervision.  LE Dressing with Supervision.  Grooming while standing at sink with Supervision.  Toileting from toilet with Supervision for hygiene and clothing management.   Toilet transfer to toilet with Supervision.                         History:     Past Medical History:   Diagnosis Date    AVM (arteriovenous malformation)     Basal cell carcinoma     Coronary artery disease     cardiac stents    Hypertension     Nonmelanoma skin cancer     ROB (obstructive sleep apnea)     Prostate cancer     Pulmonary embolism          Past Surgical History:   Procedure Laterality Date    ANGIOGRAM, CORONARY, WITH LEFT HEART CATHETERIZATION  2004 2016    APPENDECTOMY      BRACHYTHERAPY      CATARACT EXTRACTION  2009    COLONOSCOPY      CORONARY ANGIOPLASTY      CRYOTHERAPY      kidney    EXCISION OF MELANOMA  1999    removed from chest    FOREARM HARDWARE REMOVAL      KIDNEY STONE SURGERY  2001    SQUAMOUS CELL CARCINOMA EXCISION  2022       Time Tracking:     OT Date of Treatment: 03/23/24  OT Start Time: 0932  OT Stop Time: 0955  OT Total Time (min): 23 min    Billable Minutes:Evaluation 13  Self Care/Home Management 10    3/23/2024

## 2024-03-23 NOTE — SUBJECTIVE & OBJECTIVE
Interval History:  Patient seen and examined.  Overnight notes reviewed.  Orthostatic vitals from last night increased from lying to standing.  Repeat orthostatic vitals from this morning were positive and patient reported lightheadedness, shortness of breath, and palpitations at that time.  He denies lightheadedness, shortness of breath, or palpitations upon my evaluation.  Home blood pressure meds held this morning.  PT/OT ordered.  Cardiology following.  Increased IV fluids.    Review of Systems   Respiratory:  Positive for shortness of breath (intermittent).    Cardiovascular:  Positive for palpitations (intermittent). Negative for chest pain.   Gastrointestinal:  Negative for abdominal pain and nausea.   Neurological:  Positive for light-headedness (intermittent).     Objective:     Vital Signs (Most Recent):  Temp: 98.4 °F (36.9 °C) (03/23/24 0938)  Pulse: 83 (03/23/24 0943)  Resp: 16 (03/23/24 0943)  BP: (!) 152/78 (03/23/24 1350)  SpO2: 98 % (03/23/24 0943) Vital Signs (24h Range):  Temp:  [97.5 °F (36.4 °C)-98.4 °F (36.9 °C)] 98.4 °F (36.9 °C)  Pulse:  [61-83] 83  Resp:  [15-20] 16  SpO2:  [96 %-98 %] 98 %  BP: ()/() 152/78     Weight: 83 kg (183 lb)  Body mass index is 27.02 kg/m².    Intake/Output Summary (Last 24 hours) at 3/23/2024 1436  Last data filed at 3/23/2024 1226  Gross per 24 hour   Intake 2218.75 ml   Output 2875 ml   Net -656.25 ml         Physical Exam  Vitals and nursing note reviewed.   Constitutional:       General: He is not in acute distress.     Appearance: Normal appearance. He is normal weight. He is ill-appearing (Chronically).   HENT:      Head: Normocephalic and atraumatic.      Mouth/Throat:      Mouth: Mucous membranes are dry.      Pharynx: Oropharynx is clear.   Eyes:      Extraocular Movements: Extraocular movements intact.      Pupils: Pupils are equal, round, and reactive to light.   Cardiovascular:      Rate and Rhythm: Normal rate and regular rhythm.       Pulses: Normal pulses.      Heart sounds: Normal heart sounds.   Pulmonary:      Effort: Pulmonary effort is normal.      Breath sounds: Normal breath sounds.   Abdominal:      General: Abdomen is flat. Bowel sounds are normal.      Palpations: Abdomen is soft.      Tenderness: There is no abdominal tenderness. There is no guarding or rebound.   Musculoskeletal:      Cervical back: Normal range of motion and neck supple.   Skin:     General: Skin is warm.      Capillary Refill: Capillary refill takes less than 2 seconds.   Neurological:      General: No focal deficit present.      Mental Status: He is oriented to person, place, and time. Mental status is at baseline.   Psychiatric:         Mood and Affect: Mood normal.         Behavior: Behavior normal.             Significant Labs: All pertinent labs within the past 24 hours have been reviewed.  CBC:   Recent Labs   Lab 03/22/24  0820 03/22/24  1536 03/23/24  0431   WBC 5.27 4.55 5.32   HGB 10.3* 9.8* 10.3*   HCT 31.1* 29.2* 30.3*    222 230     CMP:   Recent Labs   Lab 03/22/24  0820 03/23/24  0431    138   K 4.2 4.1    108   CO2 25 24    90   BUN 12 13   CREATININE 0.9 0.8   CALCIUM 8.7 8.9   PROT 6.4  --    ALBUMIN 3.9  --    BILITOT 0.4  --    ALKPHOS 47*  --    AST 16  --    ALT 20  --    ANIONGAP 5* 6*       Significant Imaging: I have reviewed all pertinent imaging results/findings within the past 24 hours.    US Carotids:  1. On the left, stenosis in the 50-70% luminal diameter reduction range based on the calculated velocities and IC/CC ratios.   2. Left ECA waveform is somewhat erratic likely secondary to diffuse atheromatous changes, though no demonstrable occlusion.     CXR:  1.  No evidence of acute cardiopulmonary findings.   2.  Streaky interstitial opacities at the basilar segments with marginal improvement upon comparison.

## 2024-03-23 NOTE — CARE UPDATE
03/23/24 0828   Patient Assessment/Suction   Level of Consciousness (AVPU) alert   Respiratory Effort Unlabored   Expansion/Accessory Muscles/Retractions no use of accessory muscles   All Lung Fields Breath Sounds clear   Rhythm/Pattern, Respiratory depth regular;pattern regular   Cough Frequency no cough   PRE-TX-O2   Device (Oxygen Therapy) room air  (pt wore O2 last night)   SpO2 97 %   Pulse Oximetry Type Intermittent   $ Pulse Oximetry - Multiple Charge Pulse Oximetry - Multiple   Pulse 70   Resp 15   Tobacco Cessation Intervention   Do you use any type of tobacco product? No   Respiratory Evaluation   $ Care Plan Tech Time 15 min   $ Eval/Re-eval Charges Evaluation   Evaluation For New Orders   Admitting Diagnosis orthostatic hypotension   Cardiac Diagnosis CAD   Pulmonary Diagnosis pulmonary embolism   Home Oxygen   Has Home Oxygen? No   Home Aerosol, MDI, DPI, and Other Treatments/Therapies   Home Respiratory Therapy Per Patient/Review of Chart Yes   Other Home Respiratory Therapies home cpap but pt doesn't wear it   Oxygen Care Plan   Oxygen Care Plan Per Protocol   SPO2 Goal (%) 95% cardiac   Rationale SpO2 is <95% (Cardiac Pt.)   Bronchodilator Care Plan   Rationale No Rationale found   Atelectasis Care Plan   Rationale No Rational Found   Airway Clearance Care Plan   Rationale No rationale found

## 2024-03-23 NOTE — ASSESSMENT & PLAN NOTE
Patient's anemia is currently controlled. Has not received any PRBCs to date. Etiology likely d/t chronic disease  Current CBC reviewed-   Lab Results   Component Value Date    HGB 10.3 (L) 03/23/2024    HCT 30.3 (L) 03/23/2024     Monitor serial CBC and transfuse if patient becomes hemodynamically unstable, symptomatic or H/H drops below 7/21.

## 2024-03-23 NOTE — PROGRESS NOTES
[facility]  Progress Note    Patient Name: Michael Hawkins  MRN: [id number]  Patient Class: IP- Inpatient   Admission Date: 3/21/2024  Length of Stay: 1 days  Attending Physician: Leidy Kumar MD  Primary Care Provider: [facility]        Subjective:     Principal Problem:Orthostatic hypotension        HPI:  Michael Hawkins is a 84 y.o. y.o. male with a PMHx of CAD, HTN, HLD, prostate cancer, and Hx melanoma who was admitted with orthostatic hypotension.  Patient underwent LHC with Cardiology and was monitored overnight.  Cardiology was planning on discharging patient when he became very orthostatic.  SBP decreased from 121 to 73 from lying to standing and at the time patient reported lightheadedness, blurred vision, palpitations. Patient was started on IVFH.  Repeat CBC was ordered. Hospital Medicine was consulted for further workup and management of the patient.           Overview/Hospital Course:  No notes on file    Interval History:  Patient seen and examined.  Overnight notes reviewed.  Orthostatic vitals from last night increased from lying to standing.  Repeat orthostatic vitals from this morning were positive and patient reported lightheadedness, shortness of breath, and palpitations at that time.  He denies lightheadedness, shortness of breath, or palpitations upon my evaluation.  Home blood pressure meds held this morning.  PT/OT ordered.  Cardiology following.  Increased IV fluids.    Review of Systems   Respiratory:  Positive for shortness of breath (intermittent).    Cardiovascular:  Positive for palpitations (intermittent). Negative for chest pain.   Gastrointestinal:  Negative for abdominal pain and nausea.   Neurological:  Positive for light-headedness (intermittent).     Objective:     Vital Signs (Most Recent):  Temp: 98.4 °F (36.9 °C) (03/23/24 0938)  Pulse: 83 (03/23/24 0943)  Resp: 16 (03/23/24 0943)  BP: (!) 152/78 (03/23/24  1350)  SpO2: 98 % (03/23/24 0943) Vital Signs (24h Range):  Temp:  [97.5 °F (36.4 °C)-98.4 °F (36.9 °C)] 98.4 °F (36.9 °C)  Pulse:  [61-83] 83  Resp:  [15-20] 16  SpO2:  [96 %-98 %] 98 %  BP: ()/() 152/78     Weight: 83 kg (183 lb)  Body mass index is 27.02 kg/m².    Intake/Output Summary (Last 24 hours) at 3/23/2024 1436  Last data filed at 3/23/2024 1226  Gross per 24 hour   Intake 2218.75 ml   Output 2875 ml   Net -656.25 ml         Physical Exam  Vitals and nursing note reviewed.   Constitutional:       General: He is not in acute distress.     Appearance: Normal appearance. He is normal weight. He is ill-appearing (Chronically).   HENT:      Head: Normocephalic and atraumatic.      Mouth/Throat:      Mouth: Mucous membranes are dry.      Pharynx: Oropharynx is clear.   Eyes:      Extraocular Movements: Extraocular movements intact.      Pupils: Pupils are equal, round, and reactive to light.   Cardiovascular:      Rate and Rhythm: Normal rate and regular rhythm.      Pulses: Normal pulses.      Heart sounds: Normal heart sounds.   Pulmonary:      Effort: Pulmonary effort is normal.      Breath sounds: Normal breath sounds.   Abdominal:      General: Abdomen is flat. Bowel sounds are normal.      Palpations: Abdomen is soft.      Tenderness: There is no abdominal tenderness. There is no guarding or rebound.   Musculoskeletal:      Cervical back: Normal range of motion and neck supple.   Skin:     General: Skin is warm.      Capillary Refill: Capillary refill takes less than 2 seconds.   Neurological:      General: No focal deficit present.      Mental Status: He is oriented to person, place, and time. Mental status is at baseline.   Psychiatric:         Mood and Affect: Mood normal.         Behavior: Behavior normal.             Significant Labs: All pertinent labs within the past 24 hours have been reviewed.  CBC:   Recent Labs   Lab 03/22/24  0820 03/22/24  1536 03/23/24  0431   WBC 5.27 4.55 5.32    HGB 10.3* 9.8* 10.3*   HCT 31.1* 29.2* 30.3*    222 230     CMP:   Recent Labs   Lab 03/22/24  0820 03/23/24  0431    138   K 4.2 4.1    108   CO2 25 24    90   BUN 12 13   CREATININE 0.9 0.8   CALCIUM 8.7 8.9   PROT 6.4  --    ALBUMIN 3.9  --    BILITOT 0.4  --    ALKPHOS 47*  --    AST 16  --    ALT 20  --    ANIONGAP 5* 6*       Significant Imaging: I have reviewed all pertinent imaging results/findings within the past 24 hours.    US Carotids:  1. On the left, stenosis in the 50-70% luminal diameter reduction range based on the calculated velocities and IC/CC ratios.   2. Left ECA waveform is somewhat erratic likely secondary to diffuse atheromatous changes, though no demonstrable occlusion.     CXR:  1.  No evidence of acute cardiopulmonary findings.   2.  Streaky interstitial opacities at the basilar segments with marginal improvement upon comparison.     Assessment/Plan:      * Orthostatic hypotension  Acute:  - likely 2/2 decreased oral intake given pt was NPO for procedure  - [facility]  - monitor on telemetry   - cardiology consulted: appreciate recommendations   - fall precautions   -repeat orthostatic vitals   - hold p.m. Norvasc  -ultrasound carotid ordered   -repeat CBC ordered   3/23: orthostatic vitals this morning were positive and patient was symptomatic; home blood pressure meds held; cards following; increase fluid rate; PT/OT ordered    Anemia  Patient's anemia is currently controlled. Has not received any PRBCs to date. Etiology likely d/t chronic disease  Current CBC reviewed-   Lab Results   Component Value Date    HGB 10.3 (L) 03/23/2024    HCT 30.3 (L) 03/23/2024     Monitor serial CBC and transfuse if patient becomes hemodynamically unstable, symptomatic or H/H drops below 7/21.    Thyroid dysfunction  Patient has chronic hypothyroidism. TFTs reviewed-   Lab Results   Component Value Date    TSH 2.110 11/09/2022   . Will continue chronic levothyroxine and adjust for  "and clinical changes.        Mixed hyperlipidemia   Patient is chronically on statin.will continue for now. Monitor clinically. Last LDL was No results found for: "LDLCALC"         Essential hypertension  Chronic, uncontrolled. Latest blood pressure and vitals reviewed-     Temp:  [97.5 °F (36.4 °C)-98.4 °F (36.9 °C)]   Pulse:  [61-83]   Resp:  [15-20]   BP: ()/()   SpO2:  [96 %-98 %] .   Home meds for hypertension were reviewed and noted below.   Hypertension Medications               amLODIPine (NORVASC) 2.5 MG tablet Take 2 tablets (5 mg total) by mouth 2 (two) times daily.    amlodipine (NORVASC) 5 MG tablet Take 5 mg by mouth 2 (two) times daily.    cloNIDine (CATAPRES) 0.1 MG tablet Take 1 tablet (0.1 mg total) by mouth 2 (two) times daily as needed (SBP> 160). Prn three times a day    fosinopril (MONOPRIL) 40 MG tablet Take 40 mg by mouth once daily.    isosorbide mononitrate (IMDUR) 60 MG 24 hr tablet Take 1 tablet (60 mg total) by mouth once daily.    metoprolol succinate (TOPROL-XL) 50 MG 24 hr tablet Take 50 mg by mouth once daily. qam    nitroGLYCERIN (NITROSTAT) 0.4 MG SL tablet Place 0.4 mg under the tongue every 5 (five) minutes as needed for Chest pain. Prn chest pain    terazosin (HYTRIN) 5 MG capsule TAKE 1 CAPSULE EVERY EVENING            While in the hospital, will manage blood pressure as follows; Adjust home antihypertensive regimen as follows- pt with orthostatic hypotension. Hold home BP med and monitor BP.     Will utilize p.r.n. blood pressure medication only if patient's blood pressure greater than 180/110 and he develops symptoms such as worsening chest pain or shortness of breath.    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 Continue to monitor on telemetry.       VTE Risk " Mitigation (From admission, onward)      None            Discharge Planning   MILLY: 3/22/2024     Code Status: Prior   Is the patient medically ready for discharge?:     Reason for patient still in hospital (select all that apply): Patient trending condition, Laboratory test, Treatment, Consult recommendations, PT / OT recommendations, and Pending disposition  Discharge Plan A: Home with family   Discharge Delays: (!) Personal Transportation              Nakita Tyler PA-C  Department of Hospital Medicine   [facility]

## 2024-03-23 NOTE — ASSESSMENT & PLAN NOTE
Chronic, uncontrolled. Latest blood pressure and vitals reviewed-     Temp:  [97.5 °F (36.4 °C)-98.4 °F (36.9 °C)]   Pulse:  [61-83]   Resp:  [15-20]   BP: ()/()   SpO2:  [96 %-98 %] .   Home meds for hypertension were reviewed and noted below.   Hypertension Medications               amLODIPine (NORVASC) 2.5 MG tablet Take 2 tablets (5 mg total) by mouth 2 (two) times daily.    amlodipine (NORVASC) 5 MG tablet Take 5 mg by mouth 2 (two) times daily.    cloNIDine (CATAPRES) 0.1 MG tablet Take 1 tablet (0.1 mg total) by mouth 2 (two) times daily as needed (SBP> 160). Prn three times a day    fosinopril (MONOPRIL) 40 MG tablet Take 40 mg by mouth once daily.    isosorbide mononitrate (IMDUR) 60 MG 24 hr tablet Take 1 tablet (60 mg total) by mouth once daily.    metoprolol succinate (TOPROL-XL) 50 MG 24 hr tablet Take 50 mg by mouth once daily. qam    nitroGLYCERIN (NITROSTAT) 0.4 MG SL tablet Place 0.4 mg under the tongue every 5 (five) minutes as needed for Chest pain. Prn chest pain    terazosin (HYTRIN) 5 MG capsule TAKE 1 CAPSULE EVERY EVENING            While in the hospital, will manage blood pressure as follows; Adjust home antihypertensive regimen as follows- pt with orthostatic hypotension. Hold home BP med and monitor BP.     Will utilize p.r.n. blood pressure medication only if patient's blood pressure greater than 180/110 and he develops symptoms such as worsening chest pain or shortness of breath.

## 2024-03-23 NOTE — PT/OT/SLP EVAL
Physical Therapy Evaluation    Patient Name:  Michael Hawkins   MRN:  [id number]    Recommendations:     Discharge Recommendations: Low Intensity Therapy   Discharge Equipment Recommendations: none   Barriers to discharge:  orthostatic hypotension    Assessment:     Michael Hawkins is a 84 y.o. male admitted with a medical diagnosis of Orthostatic hypotension.  He presents with the following impairments/functional limitations: weakness, impaired endurance, impaired functional mobility, gait instability, impaired balance, decreased upper extremity function, decreased lower extremity function, decreased safety awareness, pain, impaired cardiopulmonary response to activity. Patient is agreeable to participation with PT evaluation. He lives with spouse and is independent at baseline. He reports pressure in his head and right arm pain since recent angiogram. (+) orthostatics: supine 161/77 (), sitting 138/65 (MAP 98), standing 109/63 (MAP 82). He reports mild lightheadedness throughout. He requests to attempt to ambulate and reports he does not feel as though he will lose his balance. He ambulated 80' in room with no AD and CGA. He is agreeable to sit up in chair post-gait with /69 (), chair alarm on, spouse present, RN notified.    Rehab Prognosis: Good; patient would benefit from acute skilled PT services to address these deficits and reach maximum level of function.    Recent Surgery: Procedure(s) (LRB):  IVUS, Coronary  Instantaneous Wave-Free Ratio (IFR) (N/A)  Angiogram, Coronary, with Left Heart Cath (N/A) 2 Days Post-Op    Plan:     During this hospitalization, patient to be seen daily to address the identified rehab impairments via gait training, therapeutic activities, therapeutic exercises and progress toward the following goals:    Plan of Care Expires:  04/23/24    Subjective     Chief Complaint: head pressure since angiogram   Patient/Family Comments/goals: sit in chair    Pain/Comfort:  Pain Rating 1:  (not rated)  Location - Side 1: Right  Location 1: arm (from angiogram)  Pain Addressed 1: Nurse notified    Patients cultural, spiritual, Restorationism conflicts given the current situation:      Living Environment:  Patient lives with spouse in a H with no RADHA   Prior to admission, patients level of function was independent. He denies any recent falls or PT. He reports decreased activity the past 6 months attributed to depression.  Equipment used at home: none.  DME owned (not currently used): none.  Upon discharge, patient will have assistance from spouse.    Objective:     Communicated with JAIRON Johnson prior to session.  Patient found HOB elevated with bed alarm, peripheral IV, telemetry  upon PT entry to room.    General Precautions: Standard, fall  Orthopedic Precautions:N/A   Braces: N/A  Respiratory Status: Room air    Exams:  RLE Strength: WFL  LLE Strength: WFL    Functional Mobility:  Bed Mobility:     Supine to Sit: minimum assistance  Transfers:     Sit to Stand:  contact guard assistance with no AD  Gait: 80' in room with no AD and CGA      AM-PAC 6 CLICK MOBILITY  Total Score:18       Treatment & Education:  Patient was educated on the importance of OOB activity and functional mobility to negate negative effects of prolonged bed rest during hospitalization, safe transfers and ambulation, and D/C planning     Patient left up in chair with all lines intact, call button in reach, chair alarm on, RN notified, and spouse present.    GOALS:   Multidisciplinary Problems       Physical Therapy Goals          Problem: Physical Therapy    Goal Priority Disciplines Outcome Goal Variances Interventions   Physical Therapy Goal     PT, PT/OT      Description: Goals to be met by: 24    Patient will increase functional independence with mobility by performin. Supine to sit with Supervision  2. Sit to stand transfer with Supervision  3. Bed to chair transfer with  Supervision using no AD  4. Gait  x 250 feet with Supervision using no AD.   5. Lower extremity exercise program x20 reps per handout, with supervision                       History:     Past Medical History:   Diagnosis Date    AVM (arteriovenous malformation)     Basal cell carcinoma     Coronary artery disease     cardiac stents    Hypertension     Nonmelanoma skin cancer     ROB (obstructive sleep apnea)     Prostate cancer     Pulmonary embolism        Past Surgical History:   Procedure Laterality Date    ANGIOGRAM, CORONARY, WITH LEFT HEART CATHETERIZATION  2004 2016    APPENDECTOMY      BRACHYTHERAPY      CATARACT EXTRACTION  2009    COLONOSCOPY      CORONARY ANGIOPLASTY      CRYOTHERAPY      kidney    EXCISION OF MELANOMA  1999    removed from chest    FOREARM HARDWARE REMOVAL      KIDNEY STONE SURGERY  2001    SQUAMOUS CELL CARCINOMA EXCISION  2022       Time Tracking:     PT Received On: 03/23/24  PT Start Time: 1320     PT Stop Time: 1345  PT Total Time (min): 25 min     Billable Minutes: Evaluation 10 and Gait Training 15      03/23/2024

## 2024-03-24 VITALS
DIASTOLIC BLOOD PRESSURE: 86 MMHG | HEART RATE: 70 BPM | RESPIRATION RATE: 18 BRPM | OXYGEN SATURATION: 98 % | BODY MASS INDEX: 27.11 KG/M2 | SYSTOLIC BLOOD PRESSURE: 186 MMHG | HEIGHT: 69 IN | TEMPERATURE: 98 F | WEIGHT: 183 LBS

## 2024-03-24 LAB
ANION GAP SERPL CALC-SCNC: 9 MMOL/L (ref 8–16)
BASOPHILS # BLD AUTO: 0.08 K/UL (ref 0–0.2)
BASOPHILS NFR BLD: 1.7 % (ref 0–1.9)
BUN SERPL-MCNC: 14 MG/DL (ref 8–23)
CALCIUM SERPL-MCNC: 9.3 MG/DL (ref 8.7–10.5)
CHLORIDE SERPL-SCNC: 105 MMOL/L (ref 95–110)
CO2 SERPL-SCNC: 24 MMOL/L (ref 23–29)
CREAT SERPL-MCNC: 0.8 MG/DL (ref 0.5–1.4)
DIFFERENTIAL METHOD BLD: ABNORMAL
EOSINOPHIL # BLD AUTO: 0.2 K/UL (ref 0–0.5)
EOSINOPHIL NFR BLD: 4.1 % (ref 0–8)
ERYTHROCYTE [DISTWIDTH] IN BLOOD BY AUTOMATED COUNT: 13.2 % (ref 11.5–14.5)
EST. GFR  (NO RACE VARIABLE): >60 ML/MIN/1.73 M^2
GLUCOSE SERPL-MCNC: 85 MG/DL (ref 70–110)
HCT VFR BLD AUTO: 33.4 % (ref 40–54)
HGB BLD-MCNC: 11.3 G/DL (ref 14–18)
IMM GRANULOCYTES # BLD AUTO: 0.02 K/UL (ref 0–0.04)
IMM GRANULOCYTES NFR BLD AUTO: 0.4 % (ref 0–0.5)
LYMPHOCYTES # BLD AUTO: 1.3 K/UL (ref 1–4.8)
LYMPHOCYTES NFR BLD: 28.5 % (ref 18–48)
MCH RBC QN AUTO: 31.7 PG (ref 27–31)
MCHC RBC AUTO-ENTMCNC: 33.8 G/DL (ref 32–36)
MCV RBC AUTO: 94 FL (ref 82–98)
MONOCYTES # BLD AUTO: 0.7 K/UL (ref 0.3–1)
MONOCYTES NFR BLD: 14.6 % (ref 4–15)
NEUTROPHILS # BLD AUTO: 2.4 K/UL (ref 1.8–7.7)
NEUTROPHILS NFR BLD: 50.7 % (ref 38–73)
NRBC BLD-RTO: 0 /100 WBC
PLATELET # BLD AUTO: 226 K/UL (ref 150–450)
PMV BLD AUTO: 8.6 FL (ref 9.2–12.9)
POTASSIUM SERPL-SCNC: 4.3 MMOL/L (ref 3.5–5.1)
RBC # BLD AUTO: 3.57 M/UL (ref 4.6–6.2)
SODIUM SERPL-SCNC: 138 MMOL/L (ref 136–145)
WBC # BLD AUTO: 4.67 K/UL (ref 3.9–12.7)

## 2024-03-24 PROCEDURE — 25000003 PHARM REV CODE 250: Performed by: INTERNAL MEDICINE

## 2024-03-24 PROCEDURE — 25000003 PHARM REV CODE 250

## 2024-03-24 PROCEDURE — 99233 SBSQ HOSP IP/OBS HIGH 50: CPT | Mod: ,,, | Performed by: INTERNAL MEDICINE

## 2024-03-24 PROCEDURE — 80048 BASIC METABOLIC PNL TOTAL CA: CPT

## 2024-03-24 PROCEDURE — 36415 COLL VENOUS BLD VENIPUNCTURE: CPT

## 2024-03-24 PROCEDURE — 97116 GAIT TRAINING THERAPY: CPT | Mod: CQ

## 2024-03-24 PROCEDURE — 85025 COMPLETE CBC W/AUTO DIFF WBC: CPT

## 2024-03-24 PROCEDURE — 25000003 PHARM REV CODE 250: Performed by: NURSE PRACTITIONER

## 2024-03-24 RX ORDER — METOPROLOL SUCCINATE 25 MG/1
25 TABLET, EXTENDED RELEASE ORAL 2 TIMES DAILY
Qty: 60 TABLET | Refills: 2 | Status: SHIPPED | OUTPATIENT
Start: 2024-03-24 | End: 2024-03-24

## 2024-03-24 RX ORDER — METOPROLOL SUCCINATE 25 MG/1
25 TABLET, EXTENDED RELEASE ORAL 2 TIMES DAILY
Qty: 60 TABLET | Refills: 2 | Status: SHIPPED | OUTPATIENT
Start: 2024-03-24 | End: 2024-06-17 | Stop reason: SDUPTHER

## 2024-03-24 RX ADMIN — CARBAMAZEPINE 200 MG: 200 TABLET ORAL at 10:03

## 2024-03-24 RX ADMIN — OXYBUTYNIN CHLORIDE 10 MG: 10 TABLET, EXTENDED RELEASE ORAL at 10:03

## 2024-03-24 RX ADMIN — METOPROLOL SUCCINATE 25 MG: 25 TABLET, EXTENDED RELEASE ORAL at 10:03

## 2024-03-24 RX ADMIN — ASPIRIN 81 MG 81 MG: 81 TABLET ORAL at 10:03

## 2024-03-24 RX ADMIN — ISOSORBIDE MONONITRATE 60 MG: 60 TABLET, EXTENDED RELEASE ORAL at 10:03

## 2024-03-24 RX ADMIN — FERROUS SULFATE TAB 325 MG (65 MG ELEMENTAL FE) 1 EACH: 325 (65 FE) TAB at 10:03

## 2024-03-24 RX ADMIN — EZETIMIBE 10 MG: 10 TABLET ORAL at 10:03

## 2024-03-24 RX ADMIN — MULTIVITAMIN TABLET 1 TABLET: TABLET at 10:03

## 2024-03-24 RX ADMIN — POLYETHYLENE GLYCOL 3350 17 G: 17 POWDER, FOR SOLUTION ORAL at 10:03

## 2024-03-24 RX ADMIN — LEVOTHYROXINE SODIUM 175 MCG: 0.1 TABLET ORAL at 05:03

## 2024-03-24 RX ADMIN — ATORVASTATIN CALCIUM 40 MG: 40 TABLET, FILM COATED ORAL at 10:03

## 2024-03-24 RX ADMIN — CLOPIDOGREL BISULFATE 75 MG: 75 TABLET, FILM COATED ORAL at 10:03

## 2024-03-24 RX ADMIN — SODIUM CHLORIDE: 9 INJECTION, SOLUTION INTRAVENOUS at 01:03

## 2024-03-24 NOTE — CARE UPDATE
03/23/24 2221   Patient Assessment/Suction   Level of Consciousness (AVPU) alert   Respiratory Effort Normal;Unlabored   Expansion/Accessory Muscles/Retractions no use of accessory muscles;no retractions;expansion symmetric   All Lung Fields Breath Sounds clear;equal bilaterally   Rhythm/Pattern, Respiratory unlabored;pattern regular;depth regular;snoring   Cough Frequency no cough   PRE-TX-O2   Device (Oxygen Therapy) room air   SpO2 96 %   Pulse Oximetry Type Intermittent   $ Pulse Oximetry - Single Charge Pulse Oximetry - Single   Pulse 82   Resp 20

## 2024-03-24 NOTE — PT/OT/SLP PROGRESS
Physical Therapy Treatment    Patient Name:  Michael Hawkins   MRN:  [id number]    Recommendations:     Discharge Recommendations: Low Intensity Therapy  Discharge Equipment Recommendations: none  Barriers to discharge:  orthostatic hypotension    Assessment:     Michael Hawkins is a 84 y.o. male admitted with a medical diagnosis of Orthostatic hypotension.  He presents with the following impairments/functional limitations: weakness, impaired endurance, impaired functional mobility, gait instability, impaired balance, decreased upper extremity function, decreased lower extremity function, decreased safety awareness, pain, impaired cardiopulmonary response to activity.    Pt up in chair and agreeable to visit. Pt has been orthostatic so blood pressure monitored throughout session.    Blood pressure in sitting 194/87 (125).    Pt performed sit to stand transfer with no AD and contact guard assist. Blood pressure in standing 149/67 (97). Pt reports some dizziness but improved from previous day.    Pt ambulated 20' with no AD and contact guard assist. RN and cardiologist entered room and spoke with pt. Pt able to maintain standing while speaking with contact guard assist.    Pt ambulated 80' x 2 with standing rest breaks in between. No reports of increased dizziness. Pt returned to room and BP was taken in standing 161/94 (120)    Pt left up in chair with chair alarm on. RN informed of blood pressure.    Rehab Prognosis: Fair; patient would benefit from acute skilled PT services to address these deficits and reach maximum level of function.    Recent Surgery: Procedure(s) (LRB):  IVUS, Coronary  Instantaneous Wave-Free Ratio (IFR) (N/A)  Angiogram, Coronary, with Left Heart Cath (N/A) 3 Days Post-Op    Plan:     During this hospitalization, patient to be seen daily to address the identified rehab impairments via gait training, therapeutic activities and progress toward the following goals:    Plan of Care Expires:   SW met with pt via addwish to complete assessment. Pt is AxO x3  and able to verbally answer assessment questions. SW used  Jose M #[id number]. Pt has family support at bedside. Pt able to confirm demographic information. Pt reports to live at home with her partner and feeling safe. Pt is independent of ADL's and no DME in use. Pt reports no medical insurance. Pt reports family to transport back home. SW updated whiteboard with SWCM name and contact information. SW confirmed pt understanding of Observation unit and expected discharge plan. SW will continue to follow pt throughout care and assist with any discharge needs.         04/19/23 7467   Discharge Planning   Assessment Type Discharge Planning Brief Assessment   Resource/Environmental Concerns none   Support Systems Spouse/significant other;Family members;Parent   Equipment Currently Used at Home none   Current Living Arrangements home   Patient/Family Anticipates Transition to home with family   Patient/Family Anticipated Services at Transition none   DME Needed Upon Discharge  none   Discharge Plan A Home with family       Future Appointments   Date Time Provider Department Center   5/1/2023  7:30 AM [facility] ODC XR-A LIMIT 350 LBS [facility] XRAY [facility]   5/1/2023  8:00 AM Cici Conteh PA-C [facility] ORTHO [facility]     [facility] Case Management  [phone]     24    Subjective     Chief Complaint: pt hoping that blood pressure will not be high today  Patient/Family Comments/goals: to get better  Pain/Comfort:  Pain Rating 1: 0/10      Objective:     Communicated with RN prior to session.  Patient found up in chair with peripheral IV, chair check, telemetry upon PT entry to room.     General Precautions: Standard, fall  Orthopedic Precautions: N/A  Braces: N/A  Respiratory Status: Room air     Functional Mobility:  Transfers:     Sit to Stand:  contact guard assistance with no AD  Gait: 80' x 2 no AD and CGA      AM-PAC 6 CLICK MOBILITY          Treatment & Education:  Pt educated on importance of time OOB, importance of intermittent mobility, safe techniques for transfers/ambulation, discharge recommendations/options, and use of call light for assistance and fall prevention.      Patient left up in chair with all lines intact, call button in reach, chair alarm on, and RN notified..    GOALS:   Multidisciplinary Problems       Physical Therapy Goals          Problem: Physical Therapy    Goal Priority Disciplines Outcome Goal Variances Interventions   Physical Therapy Goal     PT, PT/OT      Description: Goals to be met by: 24    Patient will increase functional independence with mobility by performin. Supine to sit with Supervision  2. Sit to stand transfer with Supervision  3. Bed to chair transfer with Supervision using no AD  4. Gait  x 250 feet with Supervision using no AD.   5. Lower extremity exercise program x20 reps per handout, with supervision                       Time Tracking:     PT Received On: 24  PT Start Time: 1101     PT Stop Time: 1116  PT Total Time (min): 15 min     Billable Minutes: Gait Training 15    Treatment Type: Treatment        Number of PTA visits since last PT visit: 2024

## 2024-03-24 NOTE — DISCHARGE INSTRUCTIONS
Discharge Instructions, [facility]    Thank you for choosing [facility] for your medical care. The primary doctor who is taking care of you at the time of your discharge is Leidy Kumar MD.     You were admitted to the hospital with Orthostatic hypotension.     Please note your discharge instructions, including diet/activity restrictions, follow-up appointments, and medication changes.  If you have any questions about your medical issues, prescriptions, or any other questions, please feel free to contact the [facility] Medicine Dept at [phone] and we will help.    If you are previously with Home health, outpatient PT/OT or under a therapy program, you are cleared to return to those programs.    Please direct all long term medication refills and follow up to your primary care provider, [facility]. Thank you again for letting us take care of your health care needs.    Please note the following discharge instructions per your discharging physician-  Nakita Tyler PA-C

## 2024-03-24 NOTE — PROGRESS NOTES
"Subjective:    Patient ID:  Michael Hawkins is a 84 y.o. male who presents for follow-up.  No chief complaint on file.      INTERVAL HISTORY:    03/24/2024:   Patient is feeling much better although some orthostasis still persisting   He was able to ambulate with the physical therapy relatively stable   Denies having chest discomfort no arm neck or jaw pain   And no significant shortness of breath at rest and the normal physical activity   Discussed these plans with the hospitalist team and the nursing staff        03/23/2024:   Patient is feeling better continues to have significant postural hypotension  Apparently patient had received clonidine early this morning  Complains of dizziness sitting up  Denies chest discomfort arm neck or jaw pain  He was diuresed over 4 L since his admission with a net negative of 4.7 L      HPI:      Mr. Hawkins is here today for a follow up visit. He was recently in the Hospital with colitis and diverticulitis on PO Antibiotics last dose today. On and off for 3 weeks he has had Chest tightness and SOB. This is worse with exertion. Recently he was in the [facility]. He had H/H 13/36.8. He had SARAH. He had Elevated Troponin. He had an elevated WBC, ANC. Platelet count 133. Patient kidney function in November was normal. We will need to repeat labs. He denies pain currently however his wife says he "loses his breath easily and frequently." EKGs previous and Now shown to Dr. Lan Srivastava.       Review of patient's allergies indicates:  No Known Allergies    Past Medical History:   Diagnosis Date    AVM (arteriovenous malformation)     Basal cell carcinoma     Coronary artery disease     cardiac stents    Hypertension     Nonmelanoma skin cancer     ROB (obstructive sleep apnea)     Prostate cancer     Pulmonary embolism      Past Surgical History:   Procedure Laterality Date    ANGIOGRAM, CORONARY, WITH LEFT HEART CATHETERIZATION  2004    2016    APPENDECTOMY      " BRACHYTHERAPY      CATARACT EXTRACTION      COLONOSCOPY      CORONARY ANGIOPLASTY      CRYOTHERAPY      kidney    EXCISION OF MELANOMA      removed from chest    FOREARM HARDWARE REMOVAL      KIDNEY STONE SURGERY      SQUAMOUS CELL CARCINOMA EXCISION       Social History     Tobacco Use    Smoking status: Former     Current packs/day: 0.00     Types: Cigarettes     Quit date: 1992     Years since quittin.5    Smokeless tobacco: Never   Substance Use Topics    Alcohol use: No    Drug use: No     History reviewed. No pertinent family history.     Review of Systems:   Constitution: Negative for diaphoresis and fever.   HEENT: Negative for nosebleeds.    Cardiovascular:   negative for chest pain shortness of breath  Dizziness has improved  Respiratory: Negative for shortness of breath and wheezing.    Hematologic/Lymphatic: Negative for bleeding problem. Does not bruise/bleed easily.   Skin: Negative for color change and rash.   Musculoskeletal: Negative for falls and myalgias.   Gastrointestinal: Negative for hematemesis and hematochezia.   Genitourinary: Negative for hematuria.   Neurological: Negative for dizziness and light-headedness.   Psychiatric/Behavioral: Negative for altered mental status and memory loss.          Objective:        Vitals:    24 0715   BP: (!) 188/85   Pulse: 65   Resp: 18   Temp: 98.2 °F (36.8 °C)       Lab Results   Component Value Date    WBC 4.67 2024    HGB 11.3 (L) 2024    HCT 33.4 (L) 2024     2024    ALT 20 2024    AST 16 2024     2024    K 4.3 2024     2024    CREATININE 0.8 2024    BUN 14 2024    CO2 24 2024    TSH 2.110 2022    INR 1.2 2022        ECHOCARDIOGRAM RESULTS  No results found for this or any previous visit.        CURRENT/PREVIOUS VISIT EKG  Results for orders placed or performed during the hospital encounter of 24   EKG 12-lead     Collection Time: 03/19/24  7:15 AM   Result Value Ref Range    QRS Duration 96 ms    OHS QTC Calculation 424 ms    Narrative    Test Reason : R07.89,    Vent. Rate : 074 BPM     Atrial Rate : 074 BPM     P-R Int : 186 ms          QRS Dur : 096 ms      QT Int : 382 ms       P-R-T Axes : 043 081 -45 degrees     QTc Int : 424 ms    Normal sinus rhythm  ST and T wave abnormality, consider anterolateral ischemia  Abnormal ECG  When compared with ECG of 13-MAR-2024 11:39,  No significant change was found    Referred By:             Confirmed By:      No valid procedures specified.   Results for orders placed in visit on 06/09/22    Nuclear Stress Test    Interpretation Summary    The nuclear portion of this study will be reported separately.    The EKG portion of this study is negative for ischemia.    The patient reported no chest pain during the stress test.    There were no arrhythmias during stress.      Coronary Findings    Diagnostic  Dominance: Co-dominant  Left Main   IVUS was performed. The IVUS supply used was a CATH [employer]INUM. Discrete eccentric 40% stenosis in distal left main. IVUS was performed in left main. Eccentric calcified plaque visualized in distal left main. MLA was more than 7 mm2.      Left Anterior Descending   Mild diffuse atherosclerosis. Small-caliber D1 with discrete 50-60% stenosis at ostium. Calcified D2 occluded at ostium() with faint retrograde collateral supply from apical LAD.      Left Circumflex   Mild disease. Patent stent proximal and distal circumflex. Probable severe calcification at the distal circumflex bifurcation (smaller caliber vessel). Very small-sized and patent OM1. Medium-sized OM2 with discrete 80-90% stenosis at ostium. Combined IFR across left main and ostial OM2 lesion is not significant (iFR 0.90). There was a discrete 80-90% hazy, likely heavily calcified lesion in the mid segment of OM2 (smaller caliber vessel).      Second Obtuse Marginal Branch   2nd  Mrg lesion was 90% stenosed. iFR was performed. The iFR measured 0.9. The additional iFR measured 0.9. iFR wire was normalized in the aorta. IFR was measured across both left main and ostial OM2 lesion. iFR was not significant (0.90).      Right Coronary Artery   Medium-sized, codominant vessel. Mild diffuse atherosclerosis. Patent RPDA, RPL.            Physical Exam:  CONSTITUTIONAL: No fever, no chills  HEENT: Normocephalic, atraumatic,pupils reactive to light                 NECK:  No JVD no carotid bruit  CVS: S1S2+, RRR, no murmurs,   LUNGS: Clear  ABDOMEN: Soft, NT, BS+  EXTREMITIES: No cyanosis, edema  : No alvarez catheter  NEURO: AAO X 3  PSY: Normal affect      Medication List with Changes/Refills   Current Medications    AMLODIPINE (NORVASC) 5 MG TABLET    Take 5 mg by mouth 2 (two) times daily.    ASCORBIC ACID, VITAMIN C, (VITAMIN C) 500 MG TABLET    Take 250 mg by mouth once daily.    ASPIRIN 81 MG CHEW    Take 81 mg by mouth once daily.    BETAMETHASONE DIPROPIONATE (SERNIVO) 0.05 % SPRP    Apply topically.    CARBAMAZEPINE (TEGRETOL) 200 MG TABLET    Take 200 mg by mouth 3 (three) times daily.    CHOLECALCIFEROL, VITAMIN D3, 400 UNIT TAB    Take 400 Units by mouth 2 (two) times a day.    CLONIDINE (CATAPRES) 0.1 MG TABLET    Take 1 tablet (0.1 mg total) by mouth 2 (two) times daily as needed (SBP> 160). Prn three times a day    CLOPIDOGREL (PLAVIX) 75 MG TABLET    TAKE 1 TABLET EVERY OTHER DAY    EZETIMIBE (ZETIA) 10 MG TABLET    TAKE 1 TABLET DAILY    FERROUS SULFATE 324 MG (65 MG IRON) TBEC    Take 324 mg by mouth once daily.    FISH OIL-OMEGA-3 FATTY ACIDS 300-1,000 MG CAPSULE    Take 1 capsule by mouth once daily. 1000mg daily    FLUTICASONE PROPIONATE (FLONASE) 50 MCG/ACTUATION NASAL SPRAY    1 spray by Each Nostril route once daily. One spray each nostril daily    FOSINOPRIL (MONOPRIL) 40 MG TABLET    Take 40 mg by mouth once daily.    LACTOBAC NO.41/BIFIDOBACT NO.7 (PROBIOTIC-10 ORAL)    Take  1 tablet by mouth once daily.    LEVOTHYROXINE (SYNTHROID, LEVOTHROID) 175 MCG TABLET    Take 175 mcg by mouth once daily.    LINACLOTIDE (LINZESS) 145 MCG CAP CAPSULE    Take 145 mcg by mouth daily as needed.    MAGNESIUM CITRATE ORAL    Take 135 mg by mouth 2 (two) times a day.    METOPROLOL SUCCINATE (TOPROL-XL) 50 MG 24 HR TABLET    Take 50 mg by mouth once daily. qam    MULTIVITAMIN (THERAGRAN) PER TABLET    Take 1 tablet by mouth once daily.    NITROGLYCERIN (NITROSTAT) 0.4 MG SL TABLET    Place 0.4 mg under the tongue every 5 (five) minutes as needed for Chest pain. Prn chest pain    OXYBUTYNIN (DITROPAN-XL) 10 MG 24 HR TABLET    Take 10 mg by mouth once daily.    POTASSIUM CITRATE (UROCIT-K) 10 MEQ (1,080 MG) TBSR    Take 20 mEq by mouth 2 (two) times a day. Takes 2 tabs bid, send to [facility]    PSYLLIUM (METAMUCIL) PACKET    Take 1 packet by mouth once daily. 1 tsp daily    QUETIAPINE (SEROQUEL XR) 400 MG TB24    Take 400 mg by mouth once daily.     ROSUVASTATIN (CRESTOR) 40 MG TAB    SEND TO [facility]    TEMAZEPAM (RESTORIL) 15 MG CAP    Take 30 mg by mouth every evening.    TERAZOSIN (HYTRIN) 5 MG CAPSULE    TAKE 1 CAPSULE EVERY EVENING    UBIDECARENONE (COENZYME Q10) 100 MG TAB    Take 100 mg by mouth 2 (two) times daily.   Changed and/or Refilled Medications    Modified Medication Previous Medication    ISOSORBIDE MONONITRATE (IMDUR) 60 MG 24 HR TABLET isosorbide mononitrate (IMDUR) 30 MG 24 hr tablet       Take 1 tablet (60 mg total) by mouth once daily.    Take 1 tablet (30 mg total) by mouth once daily.                     Assessment:       1. Coronary artery disease   2. Arterial hypertension  3. Significant postural hypotension   4. History of pulmonary embolism  5. History of thyroid dysfunction    RECOMMENDATIONS:   03/24/2024:  Patient has remained relatively stable  From a cardiac perspective he can maintain on the following therapy   1. Dual antiplatelet therapy   2. Continue on  isosorbide mononitrate 60 mg daily   3. Continue on Toprol-XL 25 mg p.o. b.I.d.   4. Withhold amlodipine on the blood pressure medications   5. Instructed the patient to anticipate higher blood pressure has significant postural drop is anticipated   6. Maintain on support stockings postural precautions and adequate fluid intake   7.  Follow-up in the office in 4-6 weeks time         03/23/2024  Recommend withhold on blood pressure medications  Postural hypotension maybe result of aggressive diuresis.  Discontinue amlodipine  Hold isosorbide mononitrate for the time being will resume this when the blood pressures sustains above 120 on a consistent basis.    Lower doses of Toprol-XL 25 mg p.o. b.i.d., hold for systolic blood pressure 120 or less and heart rate 60 or less  Support stockings  Postural precautions  Encouraged flu fluids  Consider midodrine for sustained hypotension below 100+  Hold discharge for today    Discussed with the hospitalist team and the nursing team      Lan Srivastava MD.    Department of Cardiology  [facility]  Date of Service:  03/24/2024

## 2024-03-24 NOTE — DISCHARGE SUMMARY
[facility] Hospital Medicine  Discharge Summary      Patient Name: Michael Hawkins  MRN: [id number]  [facility]: [id number]  Patient Class: IP- Inpatient  Admission Date: 3/21/2024  Hospital Length of Stay: 2 days  Discharge Date and Time:  03/24/2024 3:11 PM  Attending Physician: Leidy Kumar MD   Discharging Provider: Nakita Tyler PA-C  Primary Care Provider: [facility]    Primary Care Team: Networked reference to record PCT     HPI:   Michael Hawkins is a 84 y.o. y.o. male with a PMHx of CAD, HTN, HLD, prostate cancer, and Hx melanoma who was admitted with orthostatic hypotension.  Patient underwent LHC with Cardiology and was monitored overnight.  Cardiology was planning on discharging patient when he became very orthostatic.  SBP decreased from 121 to 73 from lying to standing and at the time patient reported lightheadedness, blurred vision, palpitations. Patient was started on IVFH.  Repeat CBC was ordered. Hospital Medicine was consulted for further workup and management of the patient.           Procedure(s) (LRB):  IVUS, Coronary  Instantaneous Wave-Free Ratio (IFR) (N/A)  Angiogram, Coronary, with Left Heart Cath (N/A)      Hospital Course:   Patient was admitted to the hospital with orthostatic hypotension.  Patient was monitored closely throughout his hospital stay.  Cardiology was consulted on admission.  Patient was given IV fluid hydration.  Patient's home blood pressure medications were adjusted per Cardiology recommendations.  Orthostatic hypotension and symptoms gradually improved throughout his stay.  PT/OT were consulted and recommended low intensity therapy.  Patient was seen on day of discharge.  Patient was eager for discharge.  Patient was cleared for discharge by Cardiology.  Patient's blood pressure medications were adjusted per Cardiology recommendations and home metoprolol was decreased to 25 mg b.i.d..  Patient to continue Imdur.  Patient  Problem: Patient Care Overview  Goal: Plan of Care Review  Outcome: Ongoing (interventions implemented as appropriate)  4 hour dialysis complete.  Blood rinsed back.  RIJ permcath flushed with normal saline, both lumens filled with heparin, capped and taped.  Net UF 2L.  Tolerated well.       to hold amlodipine, fosinopril, and clonidine.  Fall precautions and postural precautions discussed at length with the patient and patient's wife.  Discussed importance of oral hydration.  Home health was ordered at discharge.  Patient to follow up with PCP and Cardiology.  Return precautions discussed at length.  Patient to wear compression stockings.  All questions answered.     Goals of Care Treatment Preferences:  Code Status: Full Code      Consults:   Consults (From admission, onward)          Status Ordering Provider     Inpatient consult to Cardiology  Once        Provider:  Lan Srivastava MD    Completed ROMINA BELL     Inpatient consult to Hospitalist  Once        Provider:  Seema Rosas MD    Acknowledged DARÍO MARCOS            No new Assessment & Plan notes have been filed under this hospital service since the last note was generated.  Service: Hospital Medicine    Final Active Diagnoses:    Diagnosis Date Noted POA    PRINCIPAL PROBLEM:  Orthostatic hypotension [I95.1] 03/22/2024 Yes    Coronary artery disease involving native coronary artery of native heart with refractory angina pectoris [I25.112] 09/13/2021 Yes    Essential hypertension [I10] 09/13/2021 Yes    Mixed hyperlipidemia [E78.2] 09/13/2021 Yes    Thyroid dysfunction [E07.9] 09/13/2021 Yes    Anemia [D64.9] 09/13/2021 Yes      Problems Resolved During this Admission:       Discharged Condition: good    Disposition: Home-Health Care [facility]    Follow Up:   Follow-up Information       Estella Chen MD Follow up in 1 week(s).    Specialty: Cardiology  Contact information:  [address] [place] [address]  [address] [place] [id number]  [phone]               [facility] Follow up.    Contact information:  [address] [facility] [address] [phone]                           Patient Instructions:      COMPRESSION STOCKINGS     Order Specific Question Answer Comments   Pressure amount: 20-30 mmHg       Ambulatory referral/consult to Home Health   Standing Status: Future   Referral Priority: Routine Referral Type: Home Health   Referral Reason: Specialty Services Required   Requested Specialty: Home Health Services   Number of Visits Requested: 1     Diet Cardiac     Notify your health care provider if you experience any of the following:  temperature >100.4     Notify your health care provider if you experience any of the following:  persistent nausea and vomiting or diarrhea     Notify your health care provider if you experience any of the following:  severe uncontrolled pain     Notify your health care provider if you experience any of the following:  redness, tenderness, or signs of infection (pain, swelling, redness, odor or green/yellow discharge around incision site)     Notify your health care provider if you experience any of the following:  difficulty breathing or increased cough     Notify your health care provider if you experience any of the following:  increased confusion or weakness     Activity as tolerated       Significant Diagnostic Studies: Labs: CMP   Recent Labs   Lab 03/23/24  0431 03/24/24  0510    138   K 4.1 4.3    105   CO2 24 24   GLU 90 85   BUN 13 14   CREATININE 0.8 0.8   CALCIUM 8.9 9.3   ANIONGAP 6* 9    and CBC   Recent Labs   Lab 03/22/24  1536 03/23/24  0431 03/24/24  0510   WBC 4.55 5.32 4.67   HGB 9.8* 10.3* 11.3*   HCT 29.2* 30.3* 33.4*    230 226       Pending Diagnostic Studies:       None           Medications:  Reconciled Home Medications:      Medication List        CHANGE how you take these medications      metoprolol succinate 25 MG 24 hr tablet  Commonly known as: TOPROL-XL  Take 1 tablet (25 mg total) by mouth 2 (two) times daily. hold for systolic blood pressure 120 or less and heart rate 60 or less  What changed:   medication strength  how much to take  when to take this  additional instructions            CONTINUE taking these medications       ascorbic acid (vitamin C) 500 MG tablet  Commonly known as: VITAMIN C  Take 250 mg by mouth once daily.     aspirin 81 MG Chew  Take 81 mg by mouth once daily.     carBAMazepine 200 mg tablet  Commonly known as: TEGRETOL  Take 200 mg by mouth 3 (three) times daily.     cholecalciferol (vitamin D3) 10 mcg (400 unit) Tab  Commonly known as: VITAMIN D3  Take 400 Units by mouth 2 (two) times a day.     clopidogreL 75 mg tablet  Commonly known as: PLAVIX  TAKE 1 TABLET EVERY OTHER DAY     coenzyme Q10 100 mg Tab  Take 100 mg by mouth 2 (two) times daily.     ezetimibe 10 mg tablet  Commonly known as: ZETIA  TAKE 1 TABLET DAILY     ferrous sulfate 324 mg (65 mg iron) Tbec  Take 324 mg by mouth once daily.     fish oil-omega-3 fatty acids 300-1,000 mg capsule  Take 1 capsule by mouth once daily. 1000mg daily     fluticasone propionate 50 mcg/actuation nasal spray  Commonly known as: FLONASE  1 spray by Each Nostril route once daily. One spray each nostril daily     isosorbide mononitrate 60 MG 24 hr tablet  Commonly known as: IMDUR  Take 1 tablet (60 mg total) by mouth once daily.     levothyroxine 175 MCG tablet  Commonly known as: SYNTHROID, LEVOTHROID  Take 175 mcg by mouth once daily.     linaCLOtide 145 mcg Cap capsule  Commonly known as: LINZESS  Take 145 mcg by mouth daily as needed.     MAGNESIUM CITRATE ORAL  Take 135 mg by mouth 2 (two) times a day.     multivitamin per tablet  Commonly known as: THERAGRAN  Take 1 tablet by mouth once daily.     nitroGLYCERIN 0.4 MG SL tablet  Commonly known as: NITROSTAT  Place 0.4 mg under the tongue every 5 (five) minutes as needed for Chest pain. Prn chest pain     oxybutynin 10 MG 24 hr tablet  Commonly known as: DITROPAN-XL  Take 10 mg by mouth once daily.     potassium citrate 10 mEq (1,080 mg) Tbsr  Commonly known as: UROCIT-K  Take 20 mEq by mouth 2 (two) times a day. Takes 2 tabs bid, send to [facility]     PROBIOTIC-10 ORAL  Take 1 tablet by mouth once  daily.     psyllium packet  Commonly known as: HYDROCIL  Take 1 packet by mouth once daily. 1 tsp daily     QUEtiapine 400 MG Tb24  Commonly known as: SEROQUEL XR  Take 400 mg by mouth once daily.     rosuvastatin 40 MG Tab  Commonly known as: CRESTOR  SEND TO [facility]     SERNIVO 0.05 % Sprp  Generic drug: betamethasone dipropionate  Apply topically.     temazepam 15 mg Cap  Commonly known as: RESTORIL  Take 30 mg by mouth every evening.     terazosin 5 MG capsule  Commonly known as: HYTRIN  TAKE 1 CAPSULE EVERY EVENING            STOP taking these medications      amLODIPine 5 MG tablet  Commonly known as: NORVASC     cloNIDine 0.1 MG tablet  Commonly known as: CATAPRES     fosinopriL 40 MG tablet  Commonly known as: MONOPRIL              Indwelling Lines/Drains at time of discharge:   Lines/Drains/Airways       None                   Time spent on the discharge of patient: 35 minutes         PIPO MendozaC  Department of Hospital Medicine  Formerly [facility], later [facility]

## 2024-03-24 NOTE — PLAN OF CARE
03/24/24 1533   Final Note   Assessment Type Final Discharge Note   Anticipated Discharge Disposition Home-Health   Post-Acute Status   Post-Acute Authorization Home Health   Home Health Status Referrals Sent       Patient cleared for discharge from case management standpoint.  [facility] health will start tomorrow.     Chart and discharge orders reviewed.  Patient discharged home with no further case management needs.

## 2024-03-24 NOTE — PLAN OF CARE
Patient AAOx4. Ambulate with one person assistance. Tolerating Cardiac diet and po well. Tele in place and safety maintained. Condition stable. Bed alarm in place.

## 2024-03-24 NOTE — HOSPITAL COURSE
Patient was admitted to the hospital with orthostatic hypotension.  Patient was monitored closely throughout his hospital stay.  Cardiology was consulted on admission.  Patient was given IV fluid hydration.  Patient's home blood pressure medications were adjusted per Cardiology recommendations.  Orthostatic hypotension and symptoms gradually improved throughout his stay.  PT/OT were consulted and recommended low intensity therapy.  Patient was seen on day of discharge.  Patient was eager for discharge.  Patient was cleared for discharge by Cardiology.  Patient's blood pressure medications were adjusted per Cardiology recommendations and home metoprolol was decreased to 25 mg b.i.d..  Patient to continue Imdur.  Patient to hold amlodipine, fosinopril, and clonidine.  Fall precautions and postural precautions discussed at length with the patient and patient's wife.  Discussed importance of oral hydration.  Home health was ordered at discharge.  Patient to follow up with PCP and Cardiology.  Return precautions discussed at length.  Patient to wear compression stockings.  All questions answered.

## 2024-04-01 ENCOUNTER — PATIENT OUTREACH (OUTPATIENT)
Dept: ADMINISTRATIVE | Facility: CLINIC | Age: 84
End: 2024-04-01
Payer: MEDICARE

## 2024-04-01 NOTE — PROGRESS NOTES
C3 nurse spoke with Michael Hawkins for a TCC post hospital discharge follow up call. The patient reports does not have a scheduled HOSFU appointment. C3 nurse was unable to schedule [facility]. Patient advised to contact their PCP to schedule a HOSPFU within 5-7 days.

## 2024-04-01 NOTE — TELEPHONE ENCOUNTER
Labs scheduled 04/26, PCP 05/31 appt and mental health  Upcoming EKG, Cardiology next week 04/10

## 2024-04-04 ENCOUNTER — TELEPHONE (OUTPATIENT)
Dept: CARDIOLOGY | Facility: CLINIC | Age: 84
End: 2024-04-04
Payer: MEDICARE

## 2024-04-04 NOTE — TELEPHONE ENCOUNTER
Sadaf/ Letha from [facility]   As instructed  from provider Allie Parker,NP      Wants to make share that med has been reconciled on both ends and that they keep a log  for next visit  and call if numbers has not decreased within the next couple of days

## 2024-04-04 NOTE — TELEPHONE ENCOUNTER
----- Message from Allie Parker NP sent at 2024  9:02 AM CDT -----  Regarding: FW: home health  Contact: Letha with [facility]  Keep log for now 160/73 is ok please update med list and make sure its right in chart    ----- Message -----  From: Shirin Byers MA  Sent: 2024   8:35 AM CDT  To: Allie Parker NP  Subject: FW: home health                                  What is the directive for this patient?  ----- Message -----  From: Doug Morel  Sent: 4/3/2024   2:40 PM CDT  To: Keith Kelley Staff  Subject: home health                                      Type:  Patient Returning Call    Who Called:Letha with [facility]  Who Left Message for Patient:office nurse  Does the patient know what this is regarding?:BP readin/100/ 8:30am 160/73  Would the patient rather a call back or a response via [facility]? Please advise  Best Call Back Number:[phone]  Additional Information:

## 2024-04-09 LAB
OHS QRS DURATION: 96 MS
OHS QTC CALCULATION: 424 MS

## 2024-04-10 ENCOUNTER — TELEPHONE (OUTPATIENT)
Dept: CARDIOLOGY | Facility: CLINIC | Age: 84
End: 2024-04-10
Payer: MEDICARE

## 2024-04-10 DIAGNOSIS — I25.110 ATHEROSCLEROSIS OF NATIVE CORONARY ARTERY OF NATIVE HEART WITH UNSTABLE ANGINA PECTORIS: Primary | ICD-10-CM

## 2024-04-10 NOTE — TELEPHONE ENCOUNTER
Sw/p about the canceled clinic for 4/10/24 due to in climate  weather pt informed of new assigned slot for 5/9/24@3:30 pm

## 2024-04-11 RX ORDER — ISOSORBIDE MONONITRATE 60 MG/1
60 TABLET, EXTENDED RELEASE ORAL
Qty: 30 TABLET | Refills: 11 | Status: SHIPPED | OUTPATIENT
Start: 2024-04-11 | End: 2024-05-20 | Stop reason: SDUPTHER

## 2024-04-11 NOTE — TELEPHONE ENCOUNTER
Sadaf/ Krysten from [facility] about the symptoms this patient are exhibiting It was suggested that the patient visit the ER  for elevated  BP rep stated that she will inform the patient once again of the suggested visit to ER

## 2024-04-25 ENCOUNTER — TELEPHONE (OUTPATIENT)
Dept: CARDIOLOGY | Facility: CLINIC | Age: 84
End: 2024-04-25
Payer: MEDICARE

## 2024-04-25 NOTE — TELEPHONE ENCOUNTER
Called and let Ms. Shaina know the recommendation. She was still with the family and let them know, did find a sooner appointment for next week for hospital follow up

## 2024-04-25 NOTE — TELEPHONE ENCOUNTER
Norvasc, Monopril, and clonidine were stopped while he was in the hospital. He is having elevated pressures. 162/80, 186/75, 204/84, 213/94    He is taking nitro, he is having a little pressure in his chest with the elevated bp. Can we restart some of his bp meds or send him to the ED?

## 2024-04-25 NOTE — TELEPHONE ENCOUNTER
----- Message from To Rosen sent at 4/25/2024 11:13 AM CDT -----  Type: Needs Medical Advice  Who Called:  Shaina from Home health  Symptoms (please be specific):  said she need to speak to the nurse his BP is 162/80 elevated--R arm pain and radiation in the chest--please call and advise  Best Call Back Number: [phone]  Additional Information: thank you

## 2024-04-30 ENCOUNTER — OFFICE VISIT (OUTPATIENT)
Dept: CARDIOLOGY | Facility: CLINIC | Age: 84
End: 2024-04-30
Payer: MEDICARE

## 2024-04-30 VITALS
DIASTOLIC BLOOD PRESSURE: 69 MMHG | HEIGHT: 69 IN | OXYGEN SATURATION: 98 % | BODY MASS INDEX: 28.76 KG/M2 | SYSTOLIC BLOOD PRESSURE: 153 MMHG | HEART RATE: 62 BPM | WEIGHT: 194.19 LBS

## 2024-04-30 DIAGNOSIS — I10 ESSENTIAL HYPERTENSION: Primary | ICD-10-CM

## 2024-04-30 DIAGNOSIS — I25.112 CORONARY ARTERY DISEASE INVOLVING NATIVE CORONARY ARTERY OF NATIVE HEART WITH REFRACTORY ANGINA PECTORIS: ICD-10-CM

## 2024-04-30 DIAGNOSIS — I70.0 AORTIC ATHEROSCLEROSIS: ICD-10-CM

## 2024-04-30 DIAGNOSIS — E78.2 MIXED HYPERLIPIDEMIA: ICD-10-CM

## 2024-04-30 PROCEDURE — 99999 PR PBB SHADOW E&M-EST. PATIENT-LVL V: CPT | Mod: PBBFAC,,, | Performed by: NURSE PRACTITIONER

## 2024-04-30 PROCEDURE — 93010 ELECTROCARDIOGRAM REPORT: CPT | Mod: S$PBB,,, | Performed by: INTERNAL MEDICINE

## 2024-04-30 PROCEDURE — 99215 OFFICE O/P EST HI 40 MIN: CPT | Mod: S$PBB,,, | Performed by: NURSE PRACTITIONER

## 2024-04-30 PROCEDURE — 93005 ELECTROCARDIOGRAM TRACING: CPT | Mod: PBBFAC,PN | Performed by: INTERNAL MEDICINE

## 2024-04-30 PROCEDURE — 99215 OFFICE O/P EST HI 40 MIN: CPT | Mod: PBBFAC,PN | Performed by: NURSE PRACTITIONER

## 2024-04-30 RX ORDER — CLONIDINE HYDROCHLORIDE 0.1 MG/1
0.1 TABLET ORAL EVERY 6 HOURS PRN
Start: 2024-04-30 | End: 2025-04-30

## 2024-04-30 RX ORDER — FOSINOPRIL SODIUM 20 MG/1
20 TABLET ORAL DAILY
Qty: 90 TABLET | Refills: 3 | Status: SHIPPED | OUTPATIENT
Start: 2024-04-30

## 2024-04-30 NOTE — PROGRESS NOTES
Subjective:    Patient ID:  Michael Hawkins is a 84 y.o. male.  Chief Complaint   Patient presents with    Hospital Follow Up    Palpitations    Shortness of Breath    Blood Pressure Check    Foot Swelling       From DC summary: Hospital Course:   Patient was admitted to the hospital with orthostatic hypotension.  Patient was monitored closely throughout his hospital stay.  Cardiology was consulted on admission.  Patient was given IV fluid hydration.  Patient's home blood pressure medications were adjusted per Cardiology recommendations.  Orthostatic hypotension and symptoms gradually improved throughout his stay.  PT/OT were consulted and recommended low intensity therapy.  Patient was seen on day of discharge.  Patient was eager for discharge.  Patient was cleared for discharge by Cardiology.  Patient's blood pressure medications were adjusted per Cardiology recommendations and home metoprolol was decreased to 25 mg b.i.d..  Patient to continue Imdur.  Patient to hold amlodipine, fosinopril, and clonidine.  Fall precautions and postural precautions discussed at length with the patient and patient's wife.  Discussed importance of oral hydration.  Home health was ordered at discharge.  Patient to follow up with PCP and Cardiology.  Return precautions discussed at length.  Patient to wear compression stockings.  All questions answered.     HPI:  Patient presents today for follow up appointment with complaints of elevated BP at home as well as some shortness of breath and swelling in the lower extremities. He states he was very weak post hospitalization but received some home therapy and has been feeling better. He does still have some dizziness with quick position changes but has noticed his BP has been significantly elevated at home and he is quite concerned. He does not complain of any chest pain. He states his SOB improves with exertion.     Review of patient's allergies indicates:  No Known Allergies    Past  Medical History:   Diagnosis Date    AVM (arteriovenous malformation)     Basal cell carcinoma     Coronary artery disease     cardiac stents    Hypertension     Nonmelanoma skin cancer     ROB (obstructive sleep apnea)     Prostate cancer     Pulmonary embolism      Past Surgical History:   Procedure Laterality Date    ANGIOGRAM, CORONARY, WITH LEFT HEART CATHETERIZATION  2004    ANGIOGRAM, CORONARY, WITH LEFT HEART CATHETERIZATION N/A 3/21/2024    Procedure: Angiogram, Coronary, with Left Heart Cath;  Surgeon: Estella Chen MD;  Location: [facility] CATH/EP LAB;  Service: Cardiology;  Laterality: N/A;    APPENDECTOMY      BRACHYTHERAPY      CATARACT EXTRACTION      COLONOSCOPY      CORONARY ANGIOPLASTY      CRYOTHERAPY      kidney    EXCISION OF MELANOMA      removed from chest    FOREARM HARDWARE REMOVAL      INSTANTANEOUS WAVE-FREE RATIO (IFR) N/A 3/21/2024    Procedure: Instantaneous Wave-Free Ratio (IFR);  Surgeon: Estella Chen MD;  Location: [facility] CATH/EP LAB;  Service: Cardiology;  Laterality: N/A;    IVUS, CORONARY  3/21/2024    Procedure: IVUS, Coronary;  Surgeon: Estella Chen MD;  Location: [facility] CATH/EP LAB;  Service: Cardiology;;    KIDNEY STONE SURGERY      SQUAMOUS CELL CARCINOMA EXCISION       Social History     Tobacco Use    Smoking status: Former     Current packs/day: 0.00     Types: Cigarettes     Quit date: 1992     Years since quittin.6    Smokeless tobacco: Never   Substance Use Topics    Alcohol use: No    Drug use: No     No family history on file.     Review of Systems:   Per HPI         Objective:        Vitals:    24 1102   BP: (!) 153/69   Pulse: 62       Lab Results   Component Value Date    WBC 4.67 2024    HGB 11.3 (L) 2024    HCT 33.4 (L) 2024     2024    ALT 20 2024    AST 16 2024     2024    K 4.3 2024     2024    CREATININE 0.8 2024    BUN 14  03/24/2024    CO2 24 03/24/2024    TSH 2.110 11/09/2022    INR 1.2 11/09/2022        ECHOCARDIOGRAM RESULTS  No results found for this or any previous visit.        CURRENT/PREVIOUS VISIT EKG  Results for orders placed or performed in visit on 04/30/24   IN OFFICE EKG 12-LEAD (to [facility])    Collection Time: 04/30/24 11:19 AM   Result Value Ref Range    QRS Duration 100 ms    OHS QTC Calculation 416 ms    Narrative    Test Reason : I10,I70.0,    Vent. Rate : 065 BPM     Atrial Rate : 065 BPM     P-R Int : 190 ms          QRS Dur : 100 ms      QT Int : 400 ms       P-R-T Axes : 040 076 087 degrees     QTc Int : 416 ms    Normal sinus rhythm  Nonspecific ST and T wave abnormality  Abnormal ECG  When compared with ECG of 19-MAR-2024 07:15,  T wave inversion no longer evident in Inferior leads  T wave inversion no longer evident in Anterior leads    Referred By:             Confirmed By:      No valid procedures specified.   Results for orders placed in visit on 06/09/22    Nuclear Stress Test    Interpretation Summary    The nuclear portion of this study will be reported separately.    The EKG portion of this study is negative for ischemia.    The patient reported no chest pain during the stress test.    There were no arrhythmias during stress.      Physical Exam:  CONSTITUTIONAL: No fever, no chills  HEENT: Normocephalic, atraumatic,pupils reactive to light                 NECK:  No JVD no carotid bruit  CVS: S1S2+, RRR, no murmurs,   LUNGS: Clear  ABDOMEN: Soft, NT, BS+  EXTREMITIES: No cyanosis, edema  : No alvarez catheter  NEURO: AAO X 3  PSY: Normal affect      Medication List with Changes/Refills   New Medications    CLONIDINE (CATAPRES) 0.1 MG TABLET    Take 1 tablet (0.1 mg total) by mouth every 6 (six) hours as needed (for systolic Blood pressure greater than 180).    FOSINOPRIL (MONOPRIL) 20 MG TABLET    Take 1 tablet (20 mg total) by mouth once daily.   Current Medications    ASCORBIC ACID, VITAMIN C, (VITAMIN  C) 500 MG TABLET    Take 250 mg by mouth once daily.    ASPIRIN 81 MG CHEW    Take 81 mg by mouth once daily.    BETAMETHASONE DIPROPIONATE (SERNIVO) 0.05 % SPRP    Apply topically.    CARBAMAZEPINE (TEGRETOL) 200 MG TABLET    Take 200 mg by mouth 3 (three) times daily.    CHOLECALCIFEROL, VITAMIN D3, 400 UNIT TAB    Take 400 Units by mouth 2 (two) times a day.    CLOPIDOGREL (PLAVIX) 75 MG TABLET    TAKE 1 TABLET EVERY OTHER DAY    EZETIMIBE (ZETIA) 10 MG TABLET    TAKE 1 TABLET DAILY    FERROUS SULFATE 324 MG (65 MG IRON) TBEC    Take 324 mg by mouth once daily.    FISH OIL-OMEGA-3 FATTY ACIDS 300-1,000 MG CAPSULE    Take 1 capsule by mouth once daily. 1000mg daily    FLUTICASONE PROPIONATE (FLONASE) 50 MCG/ACTUATION NASAL SPRAY    1 spray by Each Nostril route once daily. One spray each nostril daily    ISOSORBIDE MONONITRATE (IMDUR) 60 MG 24 HR TABLET    TAKE 1 TABLET DAILY    LACTOBAC NO.41/BIFIDOBACT NO.7 (PROBIOTIC-10 ORAL)    Take 1 tablet by mouth once daily.    LEVOTHYROXINE (SYNTHROID, LEVOTHROID) 175 MCG TABLET    Take 175 mcg by mouth once daily.    LINACLOTIDE (LINZESS) 145 MCG CAP CAPSULE    Take 145 mcg by mouth daily as needed.    MAGNESIUM CITRATE ORAL    Take 135 mg by mouth 2 (two) times a day.    METOPROLOL SUCCINATE (TOPROL-XL) 25 MG 24 HR TABLET    Take 1 tablet (25 mg total) by mouth 2 (two) times daily. hold for systolic blood pressure 120 or less and heart rate 60 or less    MULTIVITAMIN (THERAGRAN) PER TABLET    Take 1 tablet by mouth once daily.    NITROGLYCERIN (NITROSTAT) 0.4 MG SL TABLET    Place 0.4 mg under the tongue every 5 (five) minutes as needed for Chest pain. Prn chest pain    OXYBUTYNIN (DITROPAN-XL) 10 MG 24 HR TABLET    Take 10 mg by mouth once daily.    POTASSIUM CITRATE (UROCIT-K) 10 MEQ (1,080 MG) TBSR    Take 20 mEq by mouth 2 (two) times a day. Takes 2 tabs bid, send to [facility]    PSYLLIUM (METAMUCIL) PACKET    Take 1 packet by mouth once daily. 1 tsp daily     QUETIAPINE (SEROQUEL XR) 400 MG TB24    Take 400 mg by mouth once daily.     ROSUVASTATIN (CRESTOR) 40 MG TAB    SEND TO [facility]    TEMAZEPAM (RESTORIL) 15 MG CAP    Take 30 mg by mouth every evening.    TERAZOSIN (HYTRIN) 5 MG CAPSULE    TAKE 1 CAPSULE EVERY EVENING    UBIDECARENONE (COENZYME Q10) 100 MG TAB    Take 100 mg by mouth 2 (two) times daily.             Assessment:       1. Essential hypertension    2. Aortic atherosclerosis    3. Mixed hyperlipidemia    4. 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 Continue Imdur 60 mg po daily.     Extensive discussion was had regarding blockages, recommendation for cardiac rehab, and medication management. He declines cardiac rehab at this time.          Relevant Orders    Echo    Essential hypertension - Primary    Current Assessment & Plan     Continue terazosin 5 mg PO QHS and metoprolol succinate 25 mg po BID.   Resume fosinopril at 10 mg po daily.   Please track your BP (blood pressure) every 4 hours while awake for 5 days and turn in a log for evaluation. Please include the date, time, BP, and heart rate on your log in a list format.  Your BP log can be turned in via [employer] or in person at the  of our office.   Can also resume clonidine 0.1 mg po Q6 hours PRN for systolic BP greater than 180. Advised him NOT to take the Prn clonidine if a scheduled antihypertensive is due.          Relevant Orders    IN OFFICE EKG 12-LEAD (to [place])    Echo    Mixed hyperlipidemia    Current Assessment & Plan     Continue rosuvastatin 40 mg po daily.          Aortic atherosclerosis    Current Assessment & Plan      Recommend risk factor modifications including low fat low cholesterol diet and regular exercise. Goal for Ldl is less than 70.         Relevant Orders    IN OFFICE EKG 12-LEAD (to [facility])       Follow up in about 4 weeks (around 5/28/2024).

## 2024-04-30 NOTE — PATIENT INSTRUCTIONS
Please track your BP (blood pressure) every 4 hours while awake for 5 days and turn in a log for evaluation. Please include the date, time, BP, and heart rate on your log in a list format.  Your BP log can be turned in via [employer] or in person at the  of our office.

## 2024-04-30 NOTE — ASSESSMENT & PLAN NOTE
Recommend risk factor modifications including low fat low cholesterol diet and regular exercise. Goal for Ldl is less than 70.

## 2024-04-30 NOTE — ASSESSMENT & PLAN NOTE
Continue terazosin 5 mg PO QHS and metoprolol succinate 25 mg po BID.   Resume fosinopril at 10 mg po daily.   Please track your BP (blood pressure) every 4 hours while awake for 5 days and turn in a log for evaluation. Please include the date, time, BP, and heart rate on your log in a list format.  Your BP log can be turned in via [employer] or in person at the  of our office.   Can also resume clonidine 0.1 mg po Q6 hours PRN for systolic BP greater than 180. Advised him NOT to take the Prn clonidine if a scheduled antihypertensive is due.

## 2024-04-30 NOTE — ASSESSMENT & PLAN NOTE
Continue aspirin 81 mg po daily and Plavix 75 mg po Q Mon, Wed, and Fri. Continue Imdur 60 mg po daily.     Extensive discussion was had regarding blockages, recommendation for cardiac rehab, and medication management. He declines cardiac rehab at this time.

## 2024-05-03 ENCOUNTER — HOSPITAL ENCOUNTER (OUTPATIENT)
Dept: CARDIOLOGY | Facility: HOSPITAL | Age: 84
Discharge: HOME | End: 2024-05-03
Attending: NURSE PRACTITIONER
Payer: MEDICARE

## 2024-05-03 VITALS — BODY MASS INDEX: 28.77 KG/M2 | HEIGHT: 69 IN | WEIGHT: 194.25 LBS

## 2024-05-03 DIAGNOSIS — I25.112 CORONARY ARTERY DISEASE INVOLVING NATIVE CORONARY ARTERY OF NATIVE HEART WITH REFRACTORY ANGINA PECTORIS: ICD-10-CM

## 2024-05-03 DIAGNOSIS — I10 ESSENTIAL HYPERTENSION: ICD-10-CM

## 2024-05-03 PROCEDURE — 93306 TTE W/DOPPLER COMPLETE: CPT

## 2024-05-03 PROCEDURE — 93306 TTE W/DOPPLER COMPLETE: CPT | Mod: 26,,, | Performed by: INTERNAL MEDICINE

## 2024-05-06 ENCOUNTER — TELEPHONE (OUTPATIENT)
Dept: CARDIOLOGY | Facility: CLINIC | Age: 84
End: 2024-05-06
Payer: MEDICARE

## 2024-05-06 NOTE — TELEPHONE ENCOUNTER
----- Message from Bhavya Pratt sent at 5/6/2024 11:53 AM CDT -----  Patient drop off a blood pressure log.

## 2024-05-07 ENCOUNTER — TELEPHONE (OUTPATIENT)
Dept: CARDIOLOGY | Facility: CLINIC | Age: 84
End: 2024-05-07
Payer: MEDICARE

## 2024-05-07 NOTE — TELEPHONE ENCOUNTER
Sw  the patient about the directive hiss provider give him about the Bp Medicaine and when to a specific rx ; clonidine  when Bp is elevated high  the patinet understood the stated

## 2024-05-07 NOTE — TELEPHONE ENCOUNTER
----- Message from Genevieve Graham NP sent at 5/7/2024  1:38 PM CDT -----  Good afternoon.   I received the BP log. It looks like the BP is starting to decrease. If he is not feeling dizzy or having near syncope, can continue with current medications including taking the clonidine only when his BP is significantly elevated. Because of the issues with his Bp dropping during the last hospitalization, I do not want the blood pressure to be low and would prefer it be slightly higher.

## 2024-05-10 LAB
AORTIC ROOT ANNULUS: 3 CM
AORTIC VALVE CUSP SEPERATION: 1.6 CM
AV INDEX (PROSTH): 0.53
AV MEAN GRADIENT: 7 MMHG
AV PEAK GRADIENT: 13 MMHG
AV VALVE AREA BY VELOCITY RATIO: 1.77 CM²
AV VALVE AREA: 1.83 CM²
AV VELOCITY RATIO: 0.51
BSA FOR ECHO PROCEDURE: 2.07 M2
CV ECHO LV RWT: 0.49 CM
DOP CALC AO PEAK VEL: 1.82 M/S
DOP CALC AO VTI: 37.6 CM
DOP CALC LVOT AREA: 3.5 CM2
DOP CALC LVOT DIAMETER: 2.1 CM
DOP CALC LVOT PEAK VEL: 0.93 M/S
DOP CALC LVOT STROKE VOLUME: 68.89 CM3
DOP CALC MV VTI: 33.2 CM
DOP CALCLVOT PEAK VEL VTI: 19.9 CM
E WAVE DECELERATION TIME: 190 MSEC
E/A RATIO: 2.04
E/E' RATIO: 12.94 M/S
ECHO LV POSTERIOR WALL: 1.28 CM (ref 0.6–1.1)
FRACTIONAL SHORTENING: 33 % (ref 28–44)
INTERVENTRICULAR SEPTUM: 1.27 CM (ref 0.6–1.1)
IVC DIAMETER: 1.51 CM
IVRT: 60 MSEC
LEFT ATRIUM SIZE: 4.8 CM
LEFT ATRIUM VOLUME INDEX MOD: 52 ML/M2
LEFT ATRIUM VOLUME MOD: 106 CM3
LEFT INTERNAL DIMENSION IN SYSTOLE: 3.52 CM (ref 2.1–4)
LEFT VENTRICLE DIASTOLIC VOLUME INDEX: 64.71 ML/M2
LEFT VENTRICLE DIASTOLIC VOLUME: 132 ML
LEFT VENTRICLE MASS INDEX: 135 G/M2
LEFT VENTRICLE SYSTOLIC VOLUME INDEX: 25.3 ML/M2
LEFT VENTRICLE SYSTOLIC VOLUME: 51.6 ML
LEFT VENTRICULAR INTERNAL DIMENSION IN DIASTOLE: 5.25 CM (ref 3.5–6)
LEFT VENTRICULAR MASS: 275.03 G
LV LATERAL E/E' RATIO: 11 M/S
LV SEPTAL E/E' RATIO: 15.71 M/S
LVOT MG: 2 MMHG
LVOT MV: 0.61 CM/S
MV MEAN GRADIENT: 2 MMHG
MV PEAK A VEL: 0.54 M/S
MV PEAK E VEL: 1.1 M/S
MV PEAK GRADIENT: 4 MMHG
MV VALVE AREA BY CONTINUITY EQUATION: 2.08 CM2
OHS CV RV/LV RATIO: 0.43 CM
PISA TR MAX VEL: 2.32 M/S
PV MV: 0.58 M/S
PV PEAK GRADIENT: 3 MMHG
PV PEAK VELOCITY: 0.82 M/S
RA PRESSURE ESTIMATED: 3 MMHG
RIGHT VENTRICULAR END-DIASTOLIC DIMENSION: 2.25 CM
RV TB RVSP: 5 MMHG
RV TISSUE DOPPLER FREE WALL SYSTOLIC VELOCITY 1 (APICAL 4 CHAMBER VIEW): 13.5 CM/S
TDI LATERAL: 0.1 M/S
TDI SEPTAL: 0.07 M/S
TDI: 0.09 M/S
TR MAX PG: 22 MMHG
TRICUSPID ANNULAR PLANE SYSTOLIC EXCURSION: 1.97 CM
TV REST PULMONARY ARTERY PRESSURE: 25 MMHG
Z-SCORE OF LEFT VENTRICULAR DIMENSION IN END DIASTOLE: -1.47
Z-SCORE OF LEFT VENTRICULAR DIMENSION IN END SYSTOLE: -0.45

## 2024-05-20 DIAGNOSIS — I25.110 ATHEROSCLEROSIS OF NATIVE CORONARY ARTERY OF NATIVE HEART WITH UNSTABLE ANGINA PECTORIS: ICD-10-CM

## 2024-05-20 NOTE — TELEPHONE ENCOUNTER
----- Message from Carmen Navarro, Patient Care Assistant sent at 5/20/2024 12:58 PM CDT -----  Regarding: refill  Contact: pt  Type:  RX Refill Request    Who Called:  pt   Refill or New Rx:  refill   RX Name and Strength:  isosorbide mononitrate (IMDUR) 60 MG 24 hr tablet    How is the patient currently taking it? 1xday   Is this a 30 day or 90 day RX:  90    Preferred Pharmacy with phone number:    [facility] - [address] [address]  Phone: [phone] Fax: [phone]    Local or Mail Order:  mail   Ordering Provider:  Keith [facility] Call Back Number:  [phone] (home)     Additional Information:  please call to advise. Thanks!

## 2024-05-20 NOTE — TELEPHONE ENCOUNTER
Patient last seen in the office with KAMARI Graham NP on 4/30/24. Patient is requesting a refill request will be sent to provider for approval

## 2024-05-21 RX ORDER — ISOSORBIDE MONONITRATE 60 MG/1
60 TABLET, EXTENDED RELEASE ORAL DAILY
Qty: 90 TABLET | Refills: 2 | Status: SHIPPED | OUTPATIENT
Start: 2024-05-21 | End: 2025-02-15

## 2024-05-29 LAB
OHS QRS DURATION: 100 MS
OHS QTC CALCULATION: 416 MS

## 2024-05-30 ENCOUNTER — OFFICE VISIT (OUTPATIENT)
Dept: CARDIOLOGY | Facility: CLINIC | Age: 84
End: 2024-05-30
Payer: MEDICARE

## 2024-05-30 VITALS
DIASTOLIC BLOOD PRESSURE: 91 MMHG | WEIGHT: 185.19 LBS | SYSTOLIC BLOOD PRESSURE: 196 MMHG | BODY MASS INDEX: 27.43 KG/M2 | HEART RATE: 65 BPM | HEIGHT: 69 IN

## 2024-05-30 DIAGNOSIS — I10 ESSENTIAL HYPERTENSION: Primary | ICD-10-CM

## 2024-05-30 DIAGNOSIS — I25.112 CORONARY ARTERY DISEASE INVOLVING NATIVE CORONARY ARTERY OF NATIVE HEART WITH REFRACTORY ANGINA PECTORIS: ICD-10-CM

## 2024-05-30 PROCEDURE — 99213 OFFICE O/P EST LOW 20 MIN: CPT | Mod: PBBFAC,PN | Performed by: NURSE PRACTITIONER

## 2024-05-30 PROCEDURE — 99213 OFFICE O/P EST LOW 20 MIN: CPT | Mod: S$PBB,,, | Performed by: NURSE PRACTITIONER

## 2024-05-30 PROCEDURE — 99999 PR PBB SHADOW E&M-EST. PATIENT-LVL III: CPT | Mod: PBBFAC,,, | Performed by: NURSE PRACTITIONER

## 2024-05-30 RX ORDER — AMLODIPINE BESYLATE 5 MG/1
5 TABLET ORAL DAILY
Qty: 90 TABLET | Refills: 3 | Status: SHIPPED | OUTPATIENT
Start: 2024-05-30 | End: 2024-07-03 | Stop reason: SDUPTHER

## 2024-05-30 NOTE — ASSESSMENT & PLAN NOTE
Resume amlodipine 5 mg po daily.   Repeat BP log.   If BP remains uncontrolled, increase fosinopril to 40 mg po daily.

## 2024-05-30 NOTE — PROGRESS NOTES
Subjective:    Patient ID:  Michael Hawkins is a 84 y.o. male.  Chief Complaint   Patient presents with    Coronary Artery Disease    Hypertension    Hyperlipidemia       HPI:  Patient reports today for follow-up appointment and complaints of continued significantly elevated blood pressure.  Is quite concerned about his blood pressure running 160s up to the 180s at times. Patient has no complaints of chest pain, dizziness, shortness of breath, palpitations, or syncope.        Review of patient's allergies indicates:  No Known Allergies    Past Medical History:   Diagnosis Date    AVM (arteriovenous malformation)     Basal cell carcinoma     Coronary artery disease     cardiac stents    Hypertension     Nonmelanoma skin cancer     ROB (obstructive sleep apnea)     Prostate cancer     Pulmonary embolism      Past Surgical History:   Procedure Laterality Date    ANGIOGRAM, CORONARY, WITH LEFT HEART CATHETERIZATION  2004 2016    ANGIOGRAM, CORONARY, WITH LEFT HEART CATHETERIZATION N/A 3/21/2024    Procedure: Angiogram, Coronary, with Left Heart Cath;  Surgeon: Estella Chen MD;  Location: [facility] CATH/EP LAB;  Service: Cardiology;  Laterality: N/A;    APPENDECTOMY      BRACHYTHERAPY      CATARACT EXTRACTION  2009    COLONOSCOPY      CORONARY ANGIOPLASTY      CRYOTHERAPY      kidney    EXCISION OF MELANOMA  1999    removed from chest    FOREARM HARDWARE REMOVAL      INSTANTANEOUS WAVE-FREE RATIO (IFR) N/A 3/21/2024    Procedure: Instantaneous Wave-Free Ratio (IFR);  Surgeon: Estella Chen MD;  Location: [facility] CATH/EP LAB;  Service: Cardiology;  Laterality: N/A;    IVUS, CORONARY  3/21/2024    Procedure: IVUS, Coronary;  Surgeon: Estella Chen MD;  Location: [facility] CATH/EP LAB;  Service: Cardiology;;    KIDNEY STONE SURGERY  2001    SQUAMOUS CELL CARCINOMA EXCISION  2022     Social History     Tobacco Use    Smoking status: Former     Current packs/day: 0.00     Types: Cigarettes     Quit date:  1992     Years since quittin.7    Smokeless tobacco: Never   Substance Use Topics    Alcohol use: No    Drug use: No     No family history on file.     Review of Systems:   Per HPI         Objective:        Vitals:    24 1415   BP: (!) 196/91   Pulse: 65       Lab Results   Component Value Date    WBC 4.67 2024    HGB 11.3 (L) 2024    HCT 33.4 (L) 2024     2024    ALT 20 2024    AST 16 2024     2024    K 4.3 2024     2024    CREATININE 0.8 2024    BUN 14 2024    CO2 24 2024    TSH 2.110 2022    INR 1.2 2022        ECHOCARDIOGRAM RESULTS  Results for orders placed during the hospital encounter of 24    Echo    Interpretation Summary    Left Ventricle: The left ventricle is normal in size. Normal wall thickness. Normal wall motion. There is normal systolic function with a visually estimated ejection fraction of 60 - 65%. There is normal diastolic function.    Right Ventricle: Normal right ventricular cavity size. Wall thickness is normal. Systolic function is normal.    Left Atrium: Left atrium is moderately dilated.    Aortic Valve: The aortic valve is a trileaflet valve. There is moderate aortic valve sclerosis. There is mild stenosis. Aortic valve area by VTI is 1.83 cm². Aortic valve peak velocity is 1.82 m/s. Mean gradient is 7 mmHg. The dimensionless index is 0.53.    Mitral Valve: There is mild regurgitation with a centrally directed jet.    Tricuspid Valve: There is mild regurgitation with a centrally directed jet.    IVC/SVC: Normal venous pressure at 3 mmHg.        CURRENT/PREVIOUS VISIT EKG  Results for orders placed or performed in visit on 24   IN OFFICE EKG 12-LEAD (to [place])    Collection Time: 24 11:19 AM   Result Value Ref Range    QRS Duration 100 ms    OHS QTC Calculation 416 ms    Narrative    Test Reason : I10,I70.0,    Vent. Rate : 065 BPM     Atrial Rate : 065  BPM     P-R Int : 190 ms          QRS Dur : 100 ms      QT Int : 400 ms       P-R-T Axes : 040 076 087 degrees     QTc Int : 416 ms    Normal sinus rhythm  Nonspecific ST and T wave abnormality  Abnormal ECG    Confirmed by April OLIVERA, Estella Bravo ([id number]) on 5/29/2024 11:55:37 AM    Referred By:             Confirmed By:Estella Chen MD     No valid procedures specified.   Results for orders placed in visit on 06/09/22    Nuclear Stress Test    Interpretation Summary    The nuclear portion of this study will be reported separately.    The EKG portion of this study is negative for ischemia.    The patient reported no chest pain during the stress test.    There were no arrhythmias during stress.      Physical Exam:  CONSTITUTIONAL: No fever, no chills  HEENT: Normocephalic, atraumatic,pupils reactive to light                 NECK:  No JVD no carotid bruit  CVS: S1S2+, RRR, no murmurs,   LUNGS: Clear  ABDOMEN: Soft, NT, BS+  EXTREMITIES: No cyanosis, edema  : No alvarez catheter  NEURO: AAO X 3  PSY: Normal affect      Medication List with Changes/Refills   New Medications    AMLODIPINE (NORVASC) 5 MG TABLET    Take 1 tablet (5 mg total) by mouth once daily.   Current Medications    ASCORBIC ACID, VITAMIN C, (VITAMIN C) 500 MG TABLET    Take 250 mg by mouth once daily.    ASPIRIN 81 MG CHEW    Take 81 mg by mouth once daily.    BETAMETHASONE DIPROPIONATE (SERNIVO) 0.05 % SPRP    Apply topically.    CARBAMAZEPINE (TEGRETOL) 200 MG TABLET    Take 200 mg by mouth 3 (three) times daily.    CHOLECALCIFEROL, VITAMIN D3, 400 UNIT TAB    Take 400 Units by mouth 2 (two) times a day.    CLONIDINE (CATAPRES) 0.1 MG TABLET    Take 1 tablet (0.1 mg total) by mouth every 6 (six) hours as needed (for systolic Blood pressure greater than 180).    CLOPIDOGREL (PLAVIX) 75 MG TABLET    TAKE 1 TABLET EVERY OTHER DAY    EZETIMIBE (ZETIA) 10 MG TABLET    TAKE 1 TABLET DAILY    FERROUS SULFATE 324 MG (65 MG IRON) TBEC    Take 324 mg by  mouth once daily.    FISH OIL-OMEGA-3 FATTY ACIDS 300-1,000 MG CAPSULE    Take 1 capsule by mouth once daily. 1000mg daily    FLUTICASONE PROPIONATE (FLONASE) 50 MCG/ACTUATION NASAL SPRAY    1 spray by Each Nostril route once daily. One spray each nostril daily    FOSINOPRIL (MONOPRIL) 20 MG TABLET    Take 1 tablet (20 mg total) by mouth once daily.    ISOSORBIDE MONONITRATE (IMDUR) 60 MG 24 HR TABLET    Take 1 tablet (60 mg total) by mouth once daily.    LACTOBAC NO.41/BIFIDOBACT NO.7 (PROBIOTIC-10 ORAL)    Take 1 tablet by mouth once daily.    LEVOTHYROXINE (SYNTHROID, LEVOTHROID) 175 MCG TABLET    Take 175 mcg by mouth once daily.    LINACLOTIDE (LINZESS) 145 MCG CAP CAPSULE    Take 145 mcg by mouth daily as needed.    MAGNESIUM CITRATE ORAL    Take 135 mg by mouth 2 (two) times a day.    METOPROLOL SUCCINATE (TOPROL-XL) 25 MG 24 HR TABLET    Take 1 tablet (25 mg total) by mouth 2 (two) times daily. hold for systolic blood pressure 120 or less and heart rate 60 or less    MULTIVITAMIN (THERAGRAN) PER TABLET    Take 1 tablet by mouth once daily.    NITROGLYCERIN (NITROSTAT) 0.4 MG SL TABLET    Place 0.4 mg under the tongue every 5 (five) minutes as needed for Chest pain. Prn chest pain    OXYBUTYNIN (DITROPAN-XL) 10 MG 24 HR TABLET    Take 10 mg by mouth once daily.    POTASSIUM CITRATE (UROCIT-K) 10 MEQ (1,080 MG) TBSR    Take 20 mEq by mouth 2 (two) times a day. Takes 2 tabs bid, send to [facility]    PSYLLIUM (METAMUCIL) PACKET    Take 1 packet by mouth once daily. 1 tsp daily    QUETIAPINE (SEROQUEL XR) 400 MG TB24    Take 400 mg by mouth once daily.     ROSUVASTATIN (CRESTOR) 40 MG TAB    SEND TO [facility]    TEMAZEPAM (RESTORIL) 15 MG CAP    Take 30 mg by mouth every evening.    TERAZOSIN (HYTRIN) 5 MG CAPSULE    TAKE 1 CAPSULE EVERY EVENING    UBIDECARENONE (COENZYME Q10) 100 MG TAB    Take 100 mg by mouth 2 (two) times daily.             Assessment:       1. Essential hypertension    2. 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   Repeat BP log.   If BP remains uncontrolled, increase fosinopril to 40 mg po daily.            Relevant Medications    amLODIPine (NORVASC) 5 MG tablet       Follow up in about 4 weeks (around 6/27/2024).

## 2024-05-30 NOTE — Clinical Note
The catheter was inserted over the wire into the distal   second OM artery. Spontaneous, unlabored and symmetrical

## 2024-06-17 DIAGNOSIS — I70.0 AORTIC ATHEROSCLEROSIS: ICD-10-CM

## 2024-06-17 DIAGNOSIS — Q27.30 ARTERIOVENOUS MALFORMATION (AVM): Primary | ICD-10-CM

## 2024-06-17 DIAGNOSIS — I25.110 ATHEROSCLEROSIS OF NATIVE CORONARY ARTERY OF NATIVE HEART WITH UNSTABLE ANGINA PECTORIS: ICD-10-CM

## 2024-06-17 PROBLEM — N17.9 AKI (ACUTE KIDNEY INJURY): Status: RESOLVED | Noted: 2024-03-13 | Resolved: 2024-06-17

## 2024-06-17 NOTE — TELEPHONE ENCOUNTER
Patient last seen in the office on 5/30/24 with Genevieve Graham Np patient is requesting a refill for  Metoprolol Succinate 25 oral BID  request  for a refill will be sent for signature and then sent electronically to pharmacy in file

## 2024-06-17 NOTE — TELEPHONE ENCOUNTER
----- Message from Karli Magaña sent at 6/17/2024  1:14 PM CDT -----  Regarding: refill med  Contact: Pt  Patient requesting a refill for . metoprolol succinate (TOPROL-XL) 25 MG 24 hr tablet    Pharmacy : [facility] [id number] [facility] [id number] , Phone- [phone]      Please advise.    Phone  [phone]    Thank You

## 2024-06-18 RX ORDER — METOPROLOL SUCCINATE 25 MG/1
25 TABLET, EXTENDED RELEASE ORAL 2 TIMES DAILY
Qty: 180 TABLET | Refills: 2 | Status: SHIPPED | OUTPATIENT
Start: 2024-06-18

## 2024-07-03 ENCOUNTER — OFFICE VISIT (OUTPATIENT)
Dept: CARDIOLOGY | Facility: CLINIC | Age: 84
End: 2024-07-03
Payer: MEDICARE

## 2024-07-03 VITALS
OXYGEN SATURATION: 97 % | SYSTOLIC BLOOD PRESSURE: 153 MMHG | WEIGHT: 188.38 LBS | HEART RATE: 61 BPM | BODY MASS INDEX: 27.9 KG/M2 | DIASTOLIC BLOOD PRESSURE: 71 MMHG | HEIGHT: 69 IN

## 2024-07-03 DIAGNOSIS — Q27.30 ARTERIOVENOUS MALFORMATION (AVM): ICD-10-CM

## 2024-07-03 DIAGNOSIS — I70.0 AORTIC ATHEROSCLEROSIS: ICD-10-CM

## 2024-07-03 DIAGNOSIS — I10 ESSENTIAL HYPERTENSION: Primary | ICD-10-CM

## 2024-07-03 DIAGNOSIS — I25.110 ATHEROSCLEROSIS OF NATIVE CORONARY ARTERY OF NATIVE HEART WITH UNSTABLE ANGINA PECTORIS: ICD-10-CM

## 2024-07-03 PROCEDURE — 99999 PR PBB SHADOW E&M-EST. PATIENT-LVL V: CPT | Mod: PBBFAC,,, | Performed by: NURSE PRACTITIONER

## 2024-07-03 PROCEDURE — 99215 OFFICE O/P EST HI 40 MIN: CPT | Mod: PBBFAC,PN | Performed by: NURSE PRACTITIONER

## 2024-07-03 PROCEDURE — 99214 OFFICE O/P EST MOD 30 MIN: CPT | Mod: S$PBB,,, | Performed by: NURSE PRACTITIONER

## 2024-07-03 RX ORDER — METOPROLOL SUCCINATE 25 MG/1
25 TABLET, EXTENDED RELEASE ORAL 2 TIMES DAILY
Qty: 180 TABLET | Refills: 2 | Status: SHIPPED | OUTPATIENT
Start: 2024-07-03

## 2024-07-03 RX ORDER — FOSINOPRIL SODIUM 40 MG/1
40 TABLET ORAL DAILY
Qty: 90 TABLET | Refills: 3 | Status: SHIPPED | OUTPATIENT
Start: 2024-07-03

## 2024-07-03 RX ORDER — ISOSORBIDE MONONITRATE 60 MG/1
60 TABLET, EXTENDED RELEASE ORAL DAILY
Qty: 90 TABLET | Refills: 2 | Status: SHIPPED | OUTPATIENT
Start: 2024-07-03 | End: 2025-03-30

## 2024-07-03 RX ORDER — FOSINOPRIL SODIUM 20 MG/1
20 TABLET ORAL DAILY
Qty: 90 TABLET | Refills: 3 | Status: CANCELLED | OUTPATIENT
Start: 2024-07-03

## 2024-07-03 RX ORDER — AMLODIPINE BESYLATE 5 MG/1
5 TABLET ORAL DAILY
Qty: 90 EACH | Refills: 3 | Status: SHIPPED | OUTPATIENT
Start: 2024-07-03 | End: 2025-07-03

## 2024-07-03 RX ORDER — TERAZOSIN 5 MG/1
5 CAPSULE ORAL NIGHTLY
Qty: 90 CAPSULE | Refills: 3 | Status: SHIPPED | OUTPATIENT
Start: 2024-07-03

## 2024-07-03 RX ORDER — HYDROCHLOROTHIAZIDE 12.5 MG/1
12.5 TABLET ORAL DAILY
Qty: 30 TABLET | Refills: 11 | Status: SHIPPED | OUTPATIENT
Start: 2024-07-03 | End: 2025-07-03

## 2024-07-03 NOTE — PROGRESS NOTES
Subjective:    Patient ID:  Michael Hawkins is a 84 y.o. male.  Chief Complaint   Patient presents with    Follow-up     4wk f/u  Bp elevating        HPI:  Patient seen today for follow-up appointment.  He has a blood pressure log which shows blood pressure has been better controlled but still has occasional spikes at times.  He also notes that his blood pressure has been low at times even as low as 88/40 but this is rare.  He has taken the clonidine appropriately and continues on his current regimen.  He does also have some mild lower extremity edema at times.    Review of patient's allergies indicates:  No Known Allergies    Past Medical History:   Diagnosis Date    AVM (arteriovenous malformation)     Basal cell carcinoma     Coronary artery disease     cardiac stents    Hypertension     Nonmelanoma skin cancer     ROB (obstructive sleep apnea)     Prostate cancer     Pulmonary embolism      Past Surgical History:   Procedure Laterality Date    ANGIOGRAM, CORONARY, WITH LEFT HEART CATHETERIZATION  2004 2016    ANGIOGRAM, CORONARY, WITH LEFT HEART CATHETERIZATION N/A 3/21/2024    Procedure: Angiogram, Coronary, with Left Heart Cath;  Surgeon: Estella Chen MD;  Location: [facility] CATH/EP LAB;  Service: Cardiology;  Laterality: N/A;    APPENDECTOMY      BRACHYTHERAPY      CATARACT EXTRACTION  2009    COLONOSCOPY      CORONARY ANGIOPLASTY      CRYOTHERAPY      kidney    EXCISION OF MELANOMA  1999    removed from chest    FOREARM HARDWARE REMOVAL      INSTANTANEOUS WAVE-FREE RATIO (IFR) N/A 3/21/2024    Procedure: Instantaneous Wave-Free Ratio (IFR);  Surgeon: Estella Chen MD;  Location: [facility] CATH/EP LAB;  Service: Cardiology;  Laterality: N/A;    IVUS, CORONARY  3/21/2024    Procedure: IVUS, Coronary;  Surgeon: Estella Chen MD;  Location: [facility] CATH/EP LAB;  Service: Cardiology;;    KIDNEY STONE SURGERY  2001    SQUAMOUS CELL CARCINOMA EXCISION  2022     Social History     Tobacco Use     Smoking status: Former     Current packs/day: 0.00     Types: Cigarettes     Quit date: 1992     Years since quittin.8    Smokeless tobacco: Never   Substance Use Topics    Alcohol use: No    Drug use: No     No family history on file.     Review of Systems:   Per HPI         Objective:        Vitals:    24 1427   BP: (!) 153/71   Pulse: 61       Lab Results   Component Value Date    WBC 4.67 2024    HGB 11.3 (L) 2024    HCT 33.4 (L) 2024     2024    ALT 20 2024    AST 16 2024     2024    K 4.3 2024     2024    CREATININE 0.8 2024    BUN 14 2024    CO2 24 2024    TSH 2.110 2022    INR 1.2 2022        ECHOCARDIOGRAM RESULTS  Results for orders placed during the hospital encounter of 24    Echo    Interpretation Summary    Left Ventricle: The left ventricle is normal in size. Normal wall thickness. Normal wall motion. There is normal systolic function with a visually estimated ejection fraction of 60 - 65%. There is normal diastolic function.    Right Ventricle: Normal right ventricular cavity size. Wall thickness is normal. Systolic function is normal.    Left Atrium: Left atrium is moderately dilated.    Aortic Valve: The aortic valve is a trileaflet valve. There is moderate aortic valve sclerosis. There is mild stenosis. Aortic valve area by VTI is 1.83 cm². Aortic valve peak velocity is 1.82 m/s. Mean gradient is 7 mmHg. The dimensionless index is 0.53.    Mitral Valve: There is mild regurgitation with a centrally directed jet.    Tricuspid Valve: There is mild regurgitation with a centrally directed jet.    IVC/SVC: Normal venous pressure at 3 mmHg.        CURRENT/PREVIOUS VISIT EKG  Results for orders placed or performed in visit on 24   IN OFFICE EKG 12-LEAD (to [place])    Collection Time: 24 11:19 AM   Result Value Ref Range    QRS Duration 100 ms    [facility] QTC Calculation 416  ms    Narrative    Test Reason : I10,I70.0,    Vent. Rate : 065 BPM     Atrial Rate : 065 BPM     P-R Int : 190 ms          QRS Dur : 100 ms      QT Int : 400 ms       P-R-T Axes : 040 076 087 degrees     QTc Int : 416 ms    Normal sinus rhythm  Nonspecific ST and T wave abnormality  Abnormal ECG    Confirmed by April OLIVERA, Estella Bravo ([id number]) on 5/29/2024 11:55:37 AM    Referred By:             Confirmed By:Estella Chen MD     No valid procedures specified.   Results for orders placed in visit on 06/09/22    Nuclear Stress Test    Interpretation Summary    The nuclear portion of this study will be reported separately.    The EKG portion of this study is negative for ischemia.    The patient reported no chest pain during the stress test.    There were no arrhythmias during stress.      Physical Exam:  CONSTITUTIONAL: No fever, no chills  HEENT: Normocephalic, atraumatic,pupils reactive to light                 NECK:  No JVD no carotid bruit  CVS: S1S2+, RRR  LUNGS: Clear  ABDOMEN: Soft, NT, BS+  EXTREMITIES: No cyanosis, edema  : No alvarez catheter  NEURO: AAO X 3  PSY: Normal affect      Medication List with Changes/Refills   New Medications    HYDROCHLOROTHIAZIDE (HYDRODIURIL) 12.5 MG TAB    Take 1 tablet (12.5 mg total) by mouth once daily.   Current Medications    ASCORBIC ACID, VITAMIN C, (VITAMIN C) 500 MG TABLET    Take 250 mg by mouth once daily.    ASPIRIN 81 MG CHEW    Take 81 mg by mouth once daily.    BETAMETHASONE DIPROPIONATE (SERNIVO) 0.05 % SPRP    Apply topically.    CARBAMAZEPINE (TEGRETOL) 200 MG TABLET    Take 200 mg by mouth 3 (three) times daily.    CHOLECALCIFEROL, VITAMIN D3, 400 UNIT TAB    Take 400 Units by mouth 2 (two) times a day.    CLONIDINE (CATAPRES) 0.1 MG TABLET    Take 1 tablet (0.1 mg total) by mouth every 6 (six) hours as needed (for systolic Blood pressure greater than 180).    CLOPIDOGREL (PLAVIX) 75 MG TABLET    TAKE 1 TABLET EVERY OTHER DAY    EZETIMIBE (ZETIA) 10 MG  TABLET    TAKE 1 TABLET DAILY    FERROUS SULFATE 324 MG (65 MG IRON) TBEC    Take 324 mg by mouth once daily.    FISH OIL-OMEGA-3 FATTY ACIDS 300-1,000 MG CAPSULE    Take 1 capsule by mouth once daily. 1000mg daily    FLUTICASONE PROPIONATE (FLONASE) 50 MCG/ACTUATION NASAL SPRAY    1 spray by Each Nostril route once daily. One spray each nostril daily    LACTOBAC NO.41/BIFIDOBACT NO.7 (PROBIOTIC-10 ORAL)    Take 1 tablet by mouth once daily.    LEVOTHYROXINE (SYNTHROID, LEVOTHROID) 175 MCG TABLET    Take 175 mcg by mouth once daily.    LINACLOTIDE (LINZESS) 145 MCG CAP CAPSULE    Take 145 mcg by mouth daily as needed.    MAGNESIUM CITRATE ORAL    Take 135 mg by mouth 2 (two) times a day.    MULTIVITAMIN (THERAGRAN) PER TABLET    Take 1 tablet by mouth once daily.    NITROGLYCERIN (NITROSTAT) 0.4 MG SL TABLET    Place 0.4 mg under the tongue every 5 (five) minutes as needed for Chest pain. Prn chest pain    OXYBUTYNIN (DITROPAN-XL) 10 MG 24 HR TABLET    Take 10 mg by mouth once daily.    POTASSIUM CITRATE (UROCIT-K) 10 MEQ (1,080 MG) TBSR    Take 20 mEq by mouth 2 (two) times a day. Takes 2 tabs bid, send to [facility]    PSYLLIUM (METAMUCIL) PACKET    Take 1 packet by mouth once daily. 1 tsp daily    QUETIAPINE (SEROQUEL XR) 400 MG TB24    Take 400 mg by mouth once daily.     ROSUVASTATIN (CRESTOR) 40 MG TAB    SEND TO [facility]    TEMAZEPAM (RESTORIL) 15 MG CAP    Take 30 mg by mouth every evening.    UBIDECARENONE (COENZYME Q10) 100 MG TAB    Take 100 mg by mouth 2 (two) times daily.   Changed and/or Refilled Medications    Modified Medication Previous Medication    AMLODIPINE (NORVASC) 5 MG TABLET amLODIPine (NORVASC) 5 MG tablet       Take 1 tablet (5 mg total) by mouth once daily.    Take 1 tablet (5 mg total) by mouth once daily.    FOSINOPRIL (MONOPRIL) 40 MG TABLET fosinopriL (MONOPRIL) 20 MG tablet       Take 1 tablet (40 mg total) by mouth once daily.    Take 1 tablet (20 mg total) by mouth once  daily.    ISOSORBIDE MONONITRATE (IMDUR) 60 MG 24 HR TABLET isosorbide mononitrate (IMDUR) 60 MG 24 hr tablet       Take 1 tablet (60 mg total) by mouth once daily.    Take 1 tablet (60 mg total) by mouth once daily.    METOPROLOL SUCCINATE (TOPROL-XL) 25 MG 24 HR TABLET metoprolol succinate (TOPROL-XL) 25 MG 24 hr tablet       Take 1 tablet (25 mg total) by mouth 2 (two) times daily.    Take 1 tablet (25 mg total) by mouth 2 (two) times daily. hold for systolic blood pressure 120 or less and heart rate 60 or less    TERAZOSIN (HYTRIN) 5 MG CAPSULE terazosin (HYTRIN) 5 MG capsule       Take 1 capsule (5 mg total) by mouth every evening.    TAKE 1 CAPSULE EVERY EVENING             Assessment:       1. Essential hypertension    2. Atherosclerosis of native coronary artery of native heart with unstable angina pectoris    3. Aortic atherosclerosis    4. Arteriovenous malformation (AVM)         Plan:     Medication refills sent.  Start hydrochlorothiazide 12.5 mg p.o. daily.  Continue other antihypertensives for now.  Continue to monitor blood pressure log.  Problem List Items Addressed This Visit          Unprioritized    Arteriovenous malformation (AVM)    Relevant Medications    metoprolol succinate (TOPROL-XL) 25 MG 24 hr tablet    Essential hypertension - Primary    Relevant Medications    metoprolol succinate (TOPROL-XL) 25 MG 24 hr tablet    isosorbide mononitrate (IMDUR) 60 MG 24 hr tablet    terazosin (HYTRIN) 5 MG capsule    amLODIPine (NORVASC) 5 MG tablet    fosinopriL (MONOPRIL) 40 MG tablet    Atherosclerosis of native coronary artery of native heart with unstable angina pectoris    Relevant Medications    metoprolol succinate (TOPROL-XL) 25 MG 24 hr tablet    isosorbide mononitrate (IMDUR) 60 MG 24 hr tablet    Aortic atherosclerosis    Relevant Medications    metoprolol succinate (TOPROL-XL) 25 MG 24 hr tablet    terazosin (HYTRIN) 5 MG capsule       Follow up in about 6 weeks (around 8/14/2024).

## 2024-07-15 DIAGNOSIS — I70.213 ATHEROSCLEROSIS OF NATIVE ARTERIES OF EXTREMITIES WITH INTERMITTENT CLAUDICATION, BILATERAL LEGS: Primary | ICD-10-CM

## 2024-07-15 DIAGNOSIS — I83.813 VARICOSE VEINS OF BILATERAL LOWER EXTREMITIES WITH PAIN: ICD-10-CM

## 2024-08-05 ENCOUNTER — HOSPITAL ENCOUNTER (OUTPATIENT)
Dept: RADIOLOGY | Facility: HOSPITAL | Age: 84
Discharge: HOME | End: 2024-08-05
Attending: SPECIALIST
Payer: MEDICARE

## 2024-08-05 ENCOUNTER — HOSPITAL ENCOUNTER (OUTPATIENT)
Dept: RADIOLOGY | Facility: HOSPITAL | Age: 84
Discharge: HOME | End: 2024-08-05
Attending: NURSE PRACTITIONER
Payer: MEDICARE

## 2024-08-05 DIAGNOSIS — C64.1 MALIGNANT NEOPLASM OF RIGHT KIDNEY, EXCEPT RENAL PELVIS: ICD-10-CM

## 2024-08-05 DIAGNOSIS — I83.813 VARICOSE VEINS OF BILATERAL LOWER EXTREMITIES WITH PAIN: ICD-10-CM

## 2024-08-05 DIAGNOSIS — I70.213 ATHEROSCLEROSIS OF NATIVE ARTERIES OF EXTREMITIES WITH INTERMITTENT CLAUDICATION, BILATERAL LEGS: ICD-10-CM

## 2024-08-05 DIAGNOSIS — C61 MALIGNANT NEOPLASM OF PROSTATE: Primary | ICD-10-CM

## 2024-08-05 PROCEDURE — 93925 LOWER EXTREMITY STUDY: CPT | Mod: 26,,, | Performed by: RADIOLOGY

## 2024-08-05 PROCEDURE — 93970 EXTREMITY STUDY: CPT | Mod: TC,PO

## 2024-08-05 PROCEDURE — 93925 LOWER EXTREMITY STUDY: CPT | Mod: TC,PO

## 2024-08-05 PROCEDURE — 93970 EXTREMITY STUDY: CPT | Mod: 26,,, | Performed by: RADIOLOGY

## 2024-08-05 PROCEDURE — 76770 US EXAM ABDO BACK WALL COMP: CPT | Mod: TC,PO

## 2024-08-05 PROCEDURE — 76770 US EXAM ABDO BACK WALL COMP: CPT | Mod: 26,,, | Performed by: RADIOLOGY

## 2024-08-09 DIAGNOSIS — C64.1 MALIGNANT NEOPLASM OF RIGHT KIDNEY, EXCEPT RENAL PELVIS: Primary | ICD-10-CM

## 2024-08-13 RX ORDER — ROSUVASTATIN CALCIUM 40 MG/1
TABLET, COATED ORAL
Qty: 90 TABLET | Refills: 3 | Status: SHIPPED | OUTPATIENT
Start: 2024-08-13

## 2024-08-22 ENCOUNTER — OFFICE VISIT (OUTPATIENT)
Dept: CARDIOLOGY | Facility: CLINIC | Age: 84
End: 2024-08-22
Payer: MEDICARE

## 2024-08-22 VITALS
SYSTOLIC BLOOD PRESSURE: 155 MMHG | WEIGHT: 189.94 LBS | HEART RATE: 59 BPM | BODY MASS INDEX: 28.05 KG/M2 | DIASTOLIC BLOOD PRESSURE: 75 MMHG | OXYGEN SATURATION: 98 %

## 2024-08-22 DIAGNOSIS — I73.9 CLAUDICATION: ICD-10-CM

## 2024-08-22 DIAGNOSIS — I10 ESSENTIAL HYPERTENSION: Primary | ICD-10-CM

## 2024-08-22 DIAGNOSIS — I25.112 CORONARY ARTERY DISEASE INVOLVING NATIVE CORONARY ARTERY OF NATIVE HEART WITH REFRACTORY ANGINA PECTORIS: ICD-10-CM

## 2024-08-22 DIAGNOSIS — I73.9 PAD (PERIPHERAL ARTERY DISEASE): ICD-10-CM

## 2024-08-22 PROCEDURE — 99999 PR PBB SHADOW E&M-EST. PATIENT-LVL III: CPT | Mod: PBBFAC,,, | Performed by: NURSE PRACTITIONER

## 2024-08-22 PROCEDURE — 99213 OFFICE O/P EST LOW 20 MIN: CPT | Mod: PBBFAC,PN | Performed by: NURSE PRACTITIONER

## 2024-08-22 RX ORDER — HYDROCHLOROTHIAZIDE 25 MG/1
25 TABLET ORAL DAILY
Qty: 90 TABLET | Refills: 3 | Status: SHIPPED | OUTPATIENT
Start: 2024-08-22 | End: 2025-08-22

## 2024-08-22 RX ORDER — FOSINOPRIL SODIUM 40 MG/1
40 TABLET ORAL DAILY
Qty: 90 TABLET | Refills: 3 | Status: SHIPPED | OUTPATIENT
Start: 2024-08-22

## 2024-08-22 NOTE — PROGRESS NOTES
Subjective:    Patient ID:  Michael Hawkins is a 84 y.o. male.  Chief Complaint   Patient presents with    Hypertension     Follow up        HPI:  Patient seen today for follow-up appointment.  Denies any chest discomfort or shortness of breath.  His blood pressure is better controlled during the day but continues to spike at times in the evening.  He recently had an arterial ultrasound of the lower extremities which shows some potential blockages.  He has been recommended to undergo a CTA of the lower extremities but he was concerned because he thought that the CTA was actually going to be an angiogram.    Review of patient's allergies indicates:  No Known Allergies    Past Medical History:   Diagnosis Date    AVM (arteriovenous malformation)     Basal cell carcinoma     Coronary artery disease     cardiac stents    Hypertension     Nonmelanoma skin cancer     ROB (obstructive sleep apnea)     Prostate cancer     Pulmonary embolism      Past Surgical History:   Procedure Laterality Date    ANGIOGRAM, CORONARY, WITH LEFT HEART CATHETERIZATION  2004 2016    ANGIOGRAM, CORONARY, WITH LEFT HEART CATHETERIZATION N/A 3/21/2024    Procedure: Angiogram, Coronary, with Left Heart Cath;  Surgeon: Estella Chen MD;  Location: [facility] CATH/EP LAB;  Service: Cardiology;  Laterality: N/A;    APPENDECTOMY      BRACHYTHERAPY      CATARACT EXTRACTION  2009    COLONOSCOPY      CORONARY ANGIOPLASTY      CRYOTHERAPY      kidney    EXCISION OF MELANOMA  1999    removed from chest    FOREARM HARDWARE REMOVAL      INSTANTANEOUS WAVE-FREE RATIO (IFR) N/A 3/21/2024    Procedure: Instantaneous Wave-Free Ratio (IFR);  Surgeon: Estella Chen MD;  Location: [facility] CATH/EP LAB;  Service: Cardiology;  Laterality: N/A;    IVUS, CORONARY  3/21/2024    Procedure: IVUS, Coronary;  Surgeon: Estella Chen MD;  Location: [facility] CATH/EP LAB;  Service: Cardiology;;    KIDNEY STONE SURGERY  2001    SQUAMOUS CELL CARCINOMA  2022     Social History     Tobacco Use    Smoking status: Former     Current packs/day: 0.00     Types: Cigarettes     Quit date: 1992     Years since quittin.9    Smokeless tobacco: Never   Substance Use Topics    Alcohol use: No    Drug use: No     No family history on file.     Review of Systems:   Per HPI         Objective:        Vitals:    24 1010   BP: (!) 155/75   Pulse: (!) 59       Lab Results   Component Value Date    WBC 4.67 2024    HGB 11.3 (L) 2024    HCT 33.4 (L) 2024     2024    ALT 20 2024    AST 16 2024     2024    K 4.3 2024     2024    CREATININE 0.8 2024    BUN 14 2024    CO2 24 2024    TSH 2.110 2022    INR 1.2 2022        ECHOCARDIOGRAM RESULTS  Results for orders placed during the hospital encounter of 24    Echo    Interpretation Summary    Left Ventricle: The left ventricle is normal in size. Normal wall thickness. Normal wall motion. There is normal systolic function with a visually estimated ejection fraction of 60 - 65%. There is normal diastolic function.    Right Ventricle: Normal right ventricular cavity size. Wall thickness is normal. Systolic function is normal.    Left Atrium: Left atrium is moderately dilated.    Aortic Valve: The aortic valve is a trileaflet valve. There is moderate aortic valve sclerosis. There is mild stenosis. Aortic valve area by VTI is 1.83 cm². Aortic valve peak velocity is 1.82 m/s. Mean gradient is 7 mmHg. The dimensionless index is 0.53.    Mitral Valve: There is mild regurgitation with a centrally directed jet.    Tricuspid Valve: There is mild regurgitation with a centrally directed jet.    IVC/SVC: Normal venous pressure at 3 mmHg.        CURRENT/PREVIOUS VISIT EKG  Results for orders placed or performed in visit on 24   IN OFFICE EKG 12-LEAD (to [place])    Collection Time: 24 11:19 AM   Result Value Ref  Range    QRS Duration 100 ms    [facility] QTC Calculation 416 ms    Narrative    Test Reason : I10,I70.0,    Vent. Rate : 065 BPM     Atrial Rate : 065 BPM     P-R Int : 190 ms          QRS Dur : 100 ms      QT Int : 400 ms       P-R-T Axes : 040 076 087 degrees     QTc Int : 416 ms    Normal sinus rhythm  Nonspecific ST and T wave abnormality  Abnormal ECG    Confirmed by April OLIVERA, Estella Bravo ([id number]) on 5/29/2024 11:55:37 AM    Referred By:             Confirmed By:Estella Chne MD     No valid procedures specified.   Results for orders placed in visit on 06/09/22    Nuclear Stress Test    Interpretation Summary    The nuclear portion of this study will be reported separately.    The EKG portion of this study is negative for ischemia.    The patient reported no chest pain during the stress test.    There were no arrhythmias during stress.      Physical Exam:  CONSTITUTIONAL: No fever, no chills  HEENT: Normocephalic, atraumatic,pupils reactive to light                 NECK:  No JVD no carotid bruit  CVS: S1S2+, RRR, no murmurs,   LUNGS: Clear  ABDOMEN: Soft, NT, BS+  EXTREMITIES: No cyanosis, edema  : No alvarez catheter  NEURO: AAO X 3  PSY: Normal affect      Medication List with Changes/Refills   Current Medications    AMLODIPINE (NORVASC) 5 MG TABLET    Take 1 tablet (5 mg total) by mouth once daily.    ASCORBIC ACID, VITAMIN C, (VITAMIN C) 500 MG TABLET    Take 250 mg by mouth once daily.    ASPIRIN 81 MG CHEW    Take 81 mg by mouth once daily.    BETAMETHASONE DIPROPIONATE (SERNIVO) 0.05 % SPRP    Apply topically.    CARBAMAZEPINE (TEGRETOL) 200 MG TABLET    Take 200 mg by mouth 3 (three) times daily.    CHOLECALCIFEROL, VITAMIN D3, 400 UNIT TAB    Take 400 Units by mouth 2 (two) times a day.    CLONIDINE (CATAPRES) 0.1 MG TABLET    Take 1 tablet (0.1 mg total) by mouth every 6 (six) hours as needed (for systolic Blood pressure greater than 180).    CLOPIDOGREL (PLAVIX) 75 MG TABLET    TAKE 1 TABLET EVERY  OTHER DAY    EZETIMIBE (ZETIA) 10 MG TABLET    TAKE 1 TABLET DAILY    FERROUS SULFATE 324 MG (65 MG IRON) TBEC    Take 324 mg by mouth once daily.    FISH OIL-OMEGA-3 FATTY ACIDS 300-1,000 MG CAPSULE    Take 1 capsule by mouth once daily. 1000mg daily    FLUTICASONE PROPIONATE (FLONASE) 50 MCG/ACTUATION NASAL SPRAY    1 spray by Each Nostril route once daily. One spray each nostril daily    ISOSORBIDE MONONITRATE (IMDUR) 60 MG 24 HR TABLET    Take 1 tablet (60 mg total) by mouth once daily.    LACTOBAC NO.41/BIFIDOBACT NO.7 (PROBIOTIC-10 ORAL)    Take 1 tablet by mouth once daily.    LEVOTHYROXINE (SYNTHROID, LEVOTHROID) 175 MCG TABLET    Take 175 mcg by mouth once daily.    LINACLOTIDE (LINZESS) 145 MCG CAP CAPSULE    Take 145 mcg by mouth daily as needed.    MAGNESIUM CITRATE ORAL    Take 135 mg by mouth 2 (two) times a day.    METOPROLOL SUCCINATE (TOPROL-XL) 25 MG 24 HR TABLET    Take 1 tablet (25 mg total) by mouth 2 (two) times daily.    MULTIVITAMIN (THERAGRAN) PER TABLET    Take 1 tablet by mouth once daily.    NITROGLYCERIN (NITROSTAT) 0.4 MG SL TABLET    Place 0.4 mg under the tongue every 5 (five) minutes as needed for Chest pain. Prn chest pain    OXYBUTYNIN (DITROPAN-XL) 10 MG 24 HR TABLET    Take 10 mg by mouth once daily.    POTASSIUM CITRATE (UROCIT-K) 10 MEQ (1,080 MG) TBSR    Take 20 mEq by mouth 2 (two) times a day. Takes 2 tabs bid, send to [facility]    PSYLLIUM (METAMUCIL) PACKET    Take 1 packet by mouth once daily. 1 tsp daily    QUETIAPINE (SEROQUEL XR) 400 MG TB24    Take 400 mg by mouth once daily.     ROSUVASTATIN (CRESTOR) 40 MG TAB    TAKE 1 TABLET DAILY    TEMAZEPAM (RESTORIL) 15 MG CAP    Take 30 mg by mouth every evening.    TERAZOSIN (HYTRIN) 5 MG CAPSULE    Take 1 capsule (5 mg total) by mouth every evening.    UBIDECARENONE (COENZYME Q10) 100 MG TAB    Take 100 mg by mouth 2 (two) times daily.   Changed and/or Refilled Medications    Modified Medication Previous Medication     FOSINOPRIL (MONOPRIL) 40 MG TABLET fosinopriL (MONOPRIL) 40 MG tablet       Take 1 tablet (40 mg total) by mouth once daily.    Take 1 tablet (40 mg total) by mouth once daily.    HYDROCHLOROTHIAZIDE (HYDRODIURIL) 25 MG TABLET hydroCHLOROthiazide (HYDRODIURIL) 12.5 MG Tab       Take 1 tablet (25 mg total) by mouth once daily.    Take 1 tablet (12.5 mg total) by mouth once daily.             Assessment:       1. Essential hypertension    2. Coronary artery disease involving native coronary artery of native heart with refractory angina pectoris    3. PAD (peripheral artery disease)    4. Claudication         Plan:     Problem List Items Addressed This Visit          Unprioritized    Coronary artery disease involving native coronary artery of native heart with refractory angina pectoris    Current Assessment & Plan     Asymptomatic and doing well on medical therapy.  Continue Plavix, aspirin, statin, Zetia, and fish oil.         Essential hypertension - Primary    Current Assessment & Plan     Blood pressure improved during the morning but still spikes in the evening.  Will increase his hydrochlorothiazide to 25 mg p.o. daily.  Continue amlodipine 5 mg p.o. daily, Imdur 60 mg p.o. daily, metoprolol succinate 25 mg p.o. b.i.d., fosinopril 40 mg p.o. daily, and terazosin 5 mg p.o. q.h.s..         Relevant Medications    hydroCHLOROthiazide (HYDRODIURIL) 25 MG tablet    fosinopriL (MONOPRIL) 40 MG tablet    PAD (peripheral artery disease)    Current Assessment & Plan     I have encouraged him to proceed with CTA of lower extremities for further evaluation and then he will need to see vascular surgery.         Claudication       Follow up in about 3 months (around 11/22/2024).

## 2024-08-22 NOTE — ASSESSMENT & PLAN NOTE
Asymptomatic and doing well on medical therapy.  Continue Plavix, aspirin, statin, Zetia, and fish oil.

## 2024-08-22 NOTE — ASSESSMENT & PLAN NOTE
Blood pressure improved during the morning but still spikes in the evening.  Will increase his hydrochlorothiazide to 25 mg p.o. daily.  Continue amlodipine 5 mg p.o. daily, Imdur 60 mg p.o. daily, metoprolol succinate 25 mg p.o. b.i.d., fosinopril 40 mg p.o. daily, and terazosin 5 mg p.o. q.h.s..

## 2024-08-22 NOTE — ASSESSMENT & PLAN NOTE
I have encouraged him to proceed with CTA of lower extremities for further evaluation and then he will need to see vascular surgery.

## 2024-11-01 RX ORDER — EZETIMIBE 10 MG/1
TABLET ORAL
Qty: 90 TABLET | Refills: 3 | Status: SHIPPED | OUTPATIENT
Start: 2024-11-01

## 2024-11-21 ENCOUNTER — OFFICE VISIT (OUTPATIENT)
Dept: CARDIOLOGY | Facility: CLINIC | Age: 84
End: 2024-11-21
Payer: MEDICARE

## 2024-11-21 VITALS
WEIGHT: 194.81 LBS | HEIGHT: 69 IN | HEART RATE: 70 BPM | SYSTOLIC BLOOD PRESSURE: 154 MMHG | DIASTOLIC BLOOD PRESSURE: 84 MMHG | BODY MASS INDEX: 28.85 KG/M2 | OXYGEN SATURATION: 97 %

## 2024-11-21 DIAGNOSIS — I25.110 ATHEROSCLEROSIS OF NATIVE CORONARY ARTERY OF NATIVE HEART WITH UNSTABLE ANGINA PECTORIS: ICD-10-CM

## 2024-11-21 DIAGNOSIS — I10 ESSENTIAL HYPERTENSION: ICD-10-CM

## 2024-11-21 DIAGNOSIS — Q27.30 ARTERIOVENOUS MALFORMATION (AVM): ICD-10-CM

## 2024-11-21 DIAGNOSIS — I70.0 AORTIC ATHEROSCLEROSIS: ICD-10-CM

## 2024-11-21 DIAGNOSIS — I73.9 PAD (PERIPHERAL ARTERY DISEASE): Primary | ICD-10-CM

## 2024-11-21 PROCEDURE — 99215 OFFICE O/P EST HI 40 MIN: CPT | Mod: PBBFAC,PN | Performed by: NURSE PRACTITIONER

## 2024-11-21 PROCEDURE — 99999 PR PBB SHADOW E&M-EST. PATIENT-LVL V: CPT | Mod: PBBFAC,,, | Performed by: NURSE PRACTITIONER

## 2024-11-21 PROCEDURE — 99214 OFFICE O/P EST MOD 30 MIN: CPT | Mod: S$PBB,,, | Performed by: NURSE PRACTITIONER

## 2024-11-21 NOTE — PROGRESS NOTES
Subjective:    Patient ID:  Michael Hawkins is a 84 y.o. male.  Chief Complaint   Patient presents with    Follow-up       HPI:  Patient seen today for follow-up appointment. He denies any chest pain, sob, or dizziness. He admits he has not been very active overall and no longer walks far due to claudication. Unfortunately he has chosen not to have any further testing done for the suspected blockages in his legs and would not want to have any procedures done to address the blockages. Patient's BP today is elevated, but he states it has been running better at home. BP log noted which shows primarily well controlled BP in the 110s-130s systolic.     Review of patient's allergies indicates:  No Known Allergies    Past Medical History:   Diagnosis Date    AVM (arteriovenous malformation)     Basal cell carcinoma     Coronary artery disease     cardiac stents    Hypertension     Nonmelanoma skin cancer     ROB (obstructive sleep apnea)     Prostate cancer     Pulmonary embolism      Past Surgical History:   Procedure Laterality Date    ANGIOGRAM, CORONARY, WITH LEFT HEART CATHETERIZATION  2004 2016    ANGIOGRAM, CORONARY, WITH LEFT HEART CATHETERIZATION N/A 3/21/2024    Procedure: Angiogram, Coronary, with Left Heart Cath;  Surgeon: Estella Chen MD;  Location: [facility] CATH/EP LAB;  Service: Cardiology;  Laterality: N/A;    APPENDECTOMY      BRACHYTHERAPY      CATARACT EXTRACTION  2009    COLONOSCOPY      CORONARY ANGIOPLASTY      CRYOTHERAPY      kidney    EXCISION OF MELANOMA  1999    removed from chest    FOREARM HARDWARE REMOVAL      INSTANTANEOUS WAVE-FREE RATIO (IFR) N/A 3/21/2024    Procedure: Instantaneous Wave-Free Ratio (IFR);  Surgeon: Estella Chen MD;  Location: [facility] CATH/EP LAB;  Service: Cardiology;  Laterality: N/A;    IVUS, CORONARY  3/21/2024    Procedure: IVUS, Coronary;  Surgeon: Estella Chen MD;  Location: [facility] CATH/EP LAB;  Service: Cardiology;;    KIDNEY STONE SURGERY   2001    SQUAMOUS CELL CARCINOMA EXCISION       Social History     Tobacco Use    Smoking status: Former     Current packs/day: 0.00     Types: Cigarettes     Quit date: 1992     Years since quittin.2    Smokeless tobacco: Never   Substance Use Topics    Alcohol use: No    Drug use: No     No family history on file.     Review of Systems:   Per HPI         Objective:        Vitals:    24 0852   BP: (!) 154/84   Pulse: 70       Lab Results   Component Value Date    WBC 4.67 2024    HGB 11.3 (L) 2024    HCT 33.4 (L) 2024     2024    ALT 20 2024    AST 16 2024     2024    K 4.3 2024     2024    CREATININE 0.8 2024    BUN 14 2024    CO2 24 2024    TSH 2.110 2022    INR 1.2 2022        ECHOCARDIOGRAM RESULTS  Results for orders placed during the hospital encounter of 24    Echo    Interpretation Summary    Left Ventricle: The left ventricle is normal in size. Normal wall thickness. Normal wall motion. There is normal systolic function with a visually estimated ejection fraction of 60 - 65%. There is normal diastolic function.    Right Ventricle: Normal right ventricular cavity size. Wall thickness is normal. Systolic function is normal.    Left Atrium: Left atrium is moderately dilated.    Aortic Valve: The aortic valve is a trileaflet valve. There is moderate aortic valve sclerosis. There is mild stenosis. Aortic valve area by VTI is 1.83 cm². Aortic valve peak velocity is 1.82 m/s. Mean gradient is 7 mmHg. The dimensionless index is 0.53.    Mitral Valve: There is mild regurgitation with a centrally directed jet.    Tricuspid Valve: There is mild regurgitation with a centrally directed jet.    IVC/SVC: Normal venous pressure at 3 mmHg.        CURRENT/PREVIOUS VISIT EKG  Results for orders placed or performed in visit on 24   IN OFFICE EKG 12-LEAD (to [place])    Collection Time: 24  11:19 AM   Result Value Ref Range    QRS Duration 100 ms    [facility] QTC Calculation 416 ms    Narrative    Test Reason : I10,I70.0,    Vent. Rate : 065 BPM     Atrial Rate : 065 BPM     P-R Int : 190 ms          QRS Dur : 100 ms      QT Int : 400 ms       P-R-T Axes : 040 076 087 degrees     QTc Int : 416 ms    Normal sinus rhythm  Nonspecific ST and T wave abnormality  Abnormal ECG    Confirmed by Estella Chen MD ([id number]) on 5/29/2024 11:55:37 AM    Referred By:             Confirmed By:Estella Chen MD     No valid procedures specified.   Results for orders placed in visit on 06/09/22    Nuclear Stress Test    Interpretation Summary    The nuclear portion of this study will be reported separately.    The EKG portion of this study is negative for ischemia.    The patient reported no chest pain during the stress test.    There were no arrhythmias during stress.      Physical Exam:  CONSTITUTIONAL: No fever, no chills  HEENT: Normocephalic, atraumatic,pupils reactive to light                 NECK:  No JVD no carotid bruit  CVS: S1S2+, RRR, no murmurs,   LUNGS: Clear  ABDOMEN: Soft, NT, BS+  EXTREMITIES: No cyanosis, edema  : No alvarez catheter  NEURO: AAO X 3  PSY: Normal affect      Medication List with Changes/Refills   Current Medications    AMLODIPINE (NORVASC) 5 MG TABLET    Take 1 tablet (5 mg total) by mouth once daily.    ASCORBIC ACID, VITAMIN C, (VITAMIN C) 500 MG TABLET    Take 250 mg by mouth once daily.    ASPIRIN 81 MG CHEW    Take 81 mg by mouth once daily.    BETAMETHASONE DIPROPIONATE (SERNIVO) 0.05 % SPRP    Apply topically.    CARBAMAZEPINE (TEGRETOL) 200 MG TABLET    Take 200 mg by mouth 3 (three) times daily.    CHOLECALCIFEROL, VITAMIN D3, 400 UNIT TAB    Take 400 Units by mouth 2 (two) times a day.    CLONIDINE (CATAPRES) 0.1 MG TABLET    Take 1 tablet (0.1 mg total) by mouth every 6 (six) hours as needed (for systolic Blood pressure greater than 180).    CLOPIDOGREL (PLAVIX) 75 MG  TABLET    TAKE 1 TABLET EVERY OTHER DAY    EZETIMIBE (ZETIA) 10 MG TABLET    TAKE 1 TABLET DAILY    FERROUS SULFATE 324 MG (65 MG IRON) TBEC    Take 324 mg by mouth once daily.    FISH OIL-OMEGA-3 FATTY ACIDS 300-1,000 MG CAPSULE    Take 1 capsule by mouth once daily. 1000mg daily    FLUTICASONE PROPIONATE (FLONASE) 50 MCG/ACTUATION NASAL SPRAY    1 spray by Each Nostril route once daily. One spray each nostril daily    FOSINOPRIL (MONOPRIL) 40 MG TABLET    Take 1 tablet (40 mg total) by mouth once daily.    HYDROCHLOROTHIAZIDE (HYDRODIURIL) 25 MG TABLET    Take 1 tablet (25 mg total) by mouth once daily.    ISOSORBIDE MONONITRATE (IMDUR) 60 MG 24 HR TABLET    Take 1 tablet (60 mg total) by mouth once daily.    LACTOBAC NO.41/BIFIDOBACT NO.7 (PROBIOTIC-10 ORAL)    Take 1 tablet by mouth once daily.    LEVOTHYROXINE (SYNTHROID, LEVOTHROID) 175 MCG TABLET    Take 175 mcg by mouth once daily.    LINACLOTIDE (LINZESS) 145 MCG CAP CAPSULE    Take 145 mcg by mouth daily as needed.    MAGNESIUM CITRATE ORAL    Take 135 mg by mouth 2 (two) times a day.    METOPROLOL SUCCINATE (TOPROL-XL) 25 MG 24 HR TABLET    Take 1 tablet (25 mg total) by mouth 2 (two) times daily.    MULTIVITAMIN (THERAGRAN) PER TABLET    Take 1 tablet by mouth once daily.    NITROGLYCERIN (NITROSTAT) 0.4 MG SL TABLET    Place 0.4 mg under the tongue every 5 (five) minutes as needed for Chest pain. Prn chest pain    OXYBUTYNIN (DITROPAN-XL) 10 MG 24 HR TABLET    Take 10 mg by mouth once daily.    POTASSIUM CITRATE (UROCIT-K) 10 MEQ (1,080 MG) TBSR    Take 20 mEq by mouth 2 (two) times a day. Takes 2 tabs bid, send to [facility]    PSYLLIUM (METAMUCIL) PACKET    Take 1 packet by mouth once daily. 1 tsp daily    QUETIAPINE (SEROQUEL XR) 400 MG TB24    Take 400 mg by mouth once daily.     ROSUVASTATIN (CRESTOR) 40 MG TAB    TAKE 1 TABLET DAILY    TEMAZEPAM (RESTORIL) 15 MG CAP    Take 30 mg by mouth every evening.    TERAZOSIN (HYTRIN) 5 MG CAPSULE     Take 1 capsule (5 mg total) by mouth every evening.    UBIDECARENONE (COENZYME Q10) 100 MG TAB    Take 100 mg by mouth 2 (two) times daily.             Assessment:       1. PAD (peripheral artery disease)    2. Atherosclerosis of native coronary artery of native heart with unstable angina pectoris    3. Essential hypertension    4. Aortic atherosclerosis    5. Arteriovenous malformation (AVM)         Plan:     Problem List Items Addressed This Visit          Unprioritized    Arteriovenous malformation (AVM)    Essential hypertension    Current Assessment & Plan     BP stable on current regimen.  Will continue current medications.         Atherosclerosis of native coronary artery of native heart with unstable angina pectoris    Current Assessment & Plan     Asymptomatic. Recommend aggressive risk factor modifications. Goal for Ldl is less than 70.  Continue aspirin 81 mg p.o. daily, Plavix 75 mg p.o. daily, Zetia 10 mg p.o. daily, Imdur 60 mg p.o. daily, and rosuvastatin 40 mg p.o. daily.         Aortic atherosclerosis    Current Assessment & Plan     Continue risk factor modifications.         PAD (peripheral artery disease) - Primary    Current Assessment & Plan     Patient still prefers not to pursue having a CTA or seeing vascular surgery.  I have counseled him on the need to exercise daily however he states that he prefers not to walk much.            Follow up in about 6 months (around 5/21/2025).

## 2024-11-25 NOTE — ASSESSMENT & PLAN NOTE
Asymptomatic. Recommend aggressive risk factor modifications. Goal for Ldl is less than 70.  Continue aspirin 81 mg p.o. daily, Plavix 75 mg p.o. daily, Zetia 10 mg p.o. daily, Imdur 60 mg p.o. daily, and rosuvastatin 40 mg p.o. daily.

## 2024-11-25 NOTE — ASSESSMENT & PLAN NOTE
Patient still prefers not to pursue having a CTA or seeing vascular surgery.  I have counseled him on the need to exercise daily however he states that he prefers not to walk much.

## 2025-01-08 ENCOUNTER — OFFICE VISIT (OUTPATIENT)
Dept: URGENT CARE | Facility: CLINIC | Age: 85
End: 2025-01-08
Payer: MEDICARE

## 2025-01-08 VITALS
BODY MASS INDEX: 28.88 KG/M2 | RESPIRATION RATE: 16 BRPM | WEIGHT: 195 LBS | HEART RATE: 76 BPM | DIASTOLIC BLOOD PRESSURE: 59 MMHG | HEIGHT: 69 IN | SYSTOLIC BLOOD PRESSURE: 99 MMHG | OXYGEN SATURATION: 96 % | TEMPERATURE: 98 F

## 2025-01-08 DIAGNOSIS — J06.9 VIRAL URI WITH COUGH: Primary | ICD-10-CM

## 2025-01-08 RX ORDER — CHLOPHEDIANOL HCL AND PYRILAMINE MALEATE 12.5; 12.5 MG/5ML; MG/5ML
10 SOLUTION ORAL EVERY 8 HOURS PRN
Qty: 150 ML | Refills: 0 | Status: SHIPPED | OUTPATIENT
Start: 2025-01-08 | End: 2025-01-13

## 2025-01-08 RX ORDER — AZELASTINE 1 MG/ML
1 SPRAY, METERED NASAL 2 TIMES DAILY PRN
Qty: 30 ML | Refills: 0 | Status: SHIPPED | OUTPATIENT
Start: 2025-01-08 | End: 2026-01-08

## 2025-01-08 RX ORDER — ALBUTEROL SULFATE 90 UG/1
2 INHALANT RESPIRATORY (INHALATION) EVERY 6 HOURS PRN
Qty: 18 G | Refills: 0 | Status: SHIPPED | OUTPATIENT
Start: 2025-01-08 | End: 2026-01-08

## 2025-01-08 RX ORDER — GUAIFENESIN 200 MG/1
400 TABLET ORAL EVERY 4 HOURS PRN
Qty: 30 TABLET | Refills: 0 | Status: SHIPPED | OUTPATIENT
Start: 2025-01-08 | End: 2025-01-15

## 2025-01-08 NOTE — PROGRESS NOTES
"Subjective:      Patient ID: Michael Hawkins is a 84 y.o. male.    Vitals:  height is 5' 9" (1.753 m) and weight is 88.5 kg (195 lb). His temperature is 97.6 °F (36.4 °C). His blood pressure is 99/59 (abnormal) and his pulse is 76. His respiration is 16 and oxygen saturation is 96%.     Chief Complaint: Cough    84-year-old male seen today for sinus congestion and cough.  Symptoms began 4-5 days ago.  He reports productive yellow sputum.  No fever or shortness for breath.  He denies taking any medication for the symptoms.    Cough  This is a new problem. Episode onset: 4 days. The problem has been unchanged. The cough is Productive of sputum. Pertinent negatives include no chest pain, chills, fever, hemoptysis, myalgias, rash, sore throat or shortness of breath.       Constitution: Negative for chills and fever.   HENT:  Positive for congestion. Negative for sore throat.    Cardiovascular:  Negative for chest pain, palpitations and sob on exertion.   Respiratory:  Positive for cough and sputum production. Negative for bloody sputum and shortness of breath.    Gastrointestinal:  Negative for nausea and vomiting.   Musculoskeletal:  Negative for muscle ache.   Skin:  Negative for rash.   Neurological:  Negative for dizziness, light-headedness, passing out, disorientation and altered mental status.   Psychiatric/Behavioral:  Negative for altered mental status, disorientation and confusion.       Objective:     Physical Exam   Constitutional: He is oriented to person, place, and time. He appears well-developed. He is cooperative.  Non-toxic appearance. He does not appear ill. No distress.   HENT:   Head: Normocephalic and atraumatic.   Ears:   Right Ear: Hearing and external ear normal.   Left Ear: Hearing and external ear normal.   Nose: Nose normal. No mucosal edema, rhinorrhea, nasal deformity or congestion. No epistaxis. Right sinus exhibits no maxillary sinus tenderness and no frontal sinus tenderness. Left " sinus exhibits no maxillary sinus tenderness and no frontal sinus tenderness.   Mouth/Throat: Uvula is midline, oropharynx is clear and moist and mucous membranes are normal. Mucous membranes are moist. No trismus in the jaw. Normal dentition. No uvula swelling. No oropharyngeal exudate, posterior oropharyngeal edema or posterior oropharyngeal erythema.   Eyes: Conjunctivae and lids are normal. No scleral icterus.   Neck: Trachea normal and phonation normal. Neck supple. No edema present. No erythema present. No neck rigidity present.   Cardiovascular: Normal rate, regular rhythm, normal heart sounds and normal pulses.   Pulmonary/Chest: Effort normal and breath sounds normal. No stridor. No respiratory distress. He has no decreased breath sounds. He has no rhonchi.   Abdominal: Normal appearance.   Musculoskeletal: Normal range of motion.         General: No deformity. Normal range of motion.   Neurological: no focal deficit. He is alert and oriented to person, place, and time. He exhibits normal muscle tone. Coordination normal.   Skin: Skin is warm, dry, intact, not diaphoretic and not pale. Capillary refill takes 2 to 3 seconds.   Psychiatric: His speech is normal and behavior is normal. Judgment and thought content normal.   Nursing note and vitals reviewed.      Assessment:     1. Viral URI with cough        Plan:       Viral URI with cough  -     azelastine (ASTELIN) 137 mcg (0.1 %) nasal spray; 1 spray (137 mcg total) by Nasal route 2 (two) times daily as needed for Rhinitis.  Dispense: 30 mL; Refill: 0  -     guaiFENesin 200 mg tablet; Take 2 tablets (400 mg total) by mouth every 4 (four) hours as needed for Congestion.  Dispense: 30 tablet; Refill: 0  -     benzocaine-menthoL 6-10 mg lozenge; Take 1 lozenge by mouth every 2 (two) hours as needed (Sore Throat).  Dispense: 18 tablet; Refill: 0  -     pyrilamine-chlophedianoL (NINJACOF) 12.5-12.5 mg/5 mL Liqd; Take 10 mLs by mouth every 8 (eight) hours as  needed (cough/sinus congestion).  Dispense: 150 mL; Refill: 0  -     albuterol (PROVENTIL HFA) 90 mcg/actuation inhaler; Inhale 2 puffs into the lungs every 6 (six) hours as needed for Wheezing. Rescue  Dispense: 18 g; Refill: 0      The physical exam findings were discussed with the patient and all questions answered.  No testing done due to time since onset of illness and low severity of symptoms. We discussed symptom monitoring, conservative care methods, medication use, and follow up orders. he verbalized understanding and agreement with the plan of care.

## 2025-01-08 NOTE — PATIENT INSTRUCTIONS
Increase clear fluid intake  Stop all current over the counter cough, cold, flu medicine  Tylenol/motrin otc for fever or pain  Use Astelin nasal spray for sinus congestion and pressure.  Take Mucinex   as needed for chest congestion and coughing. Take mucinex with a full glass of water at each dose  May use Ninja Cof syrup as needed for cough.  Add a humidifier to your room at bedtime for respiratory comfort.  May use albuterol inhaler as needed for chest tightness and wheezing  Saltwater gargles 4 x daily and benzocaine anesthetic throat lozenges for sore throat. May also add honey based cough syrup  Follow up with PCP  Go immediately to the nearest emergency room for shortness of breath, chest pain,  or other emergent concern.  Return to clinic for new, worse, or unresolving symptoms

## 2025-01-14 ENCOUNTER — OFFICE VISIT (OUTPATIENT)
Dept: URGENT CARE | Facility: CLINIC | Age: 85
End: 2025-01-14
Payer: MEDICARE

## 2025-01-14 VITALS
RESPIRATION RATE: 18 BRPM | SYSTOLIC BLOOD PRESSURE: 106 MMHG | WEIGHT: 186 LBS | DIASTOLIC BLOOD PRESSURE: 65 MMHG | HEIGHT: 69 IN | BODY MASS INDEX: 27.55 KG/M2 | HEART RATE: 72 BPM | TEMPERATURE: 98 F | OXYGEN SATURATION: 98 %

## 2025-01-14 DIAGNOSIS — J40 BRONCHITIS: Primary | ICD-10-CM

## 2025-01-14 DIAGNOSIS — R06.00 DYSPNEA, UNSPECIFIED TYPE: ICD-10-CM

## 2025-01-14 DIAGNOSIS — R05.9 COUGH, UNSPECIFIED TYPE: ICD-10-CM

## 2025-01-14 LAB
CTP QC/QA: YES
CTP QC/QA: YES
FLUAV AG NPH QL: NEGATIVE
FLUBV AG NPH QL: NEGATIVE
SARS-COV-2 AG RESP QL IA.RAPID: NEGATIVE

## 2025-01-14 PROCEDURE — 87811 SARS-COV-2 COVID19 W/OPTIC: CPT | Mod: QW,S$GLB,, | Performed by: NURSE PRACTITIONER

## 2025-01-14 PROCEDURE — 99214 OFFICE O/P EST MOD 30 MIN: CPT | Mod: S$GLB,,, | Performed by: NURSE PRACTITIONER

## 2025-01-14 PROCEDURE — 87804 INFLUENZA ASSAY W/OPTIC: CPT | Mod: QW,,, | Performed by: NURSE PRACTITIONER

## 2025-01-14 RX ORDER — DOXYCYCLINE 100 MG/1
100 CAPSULE ORAL 2 TIMES DAILY
Qty: 10 CAPSULE | Refills: 0 | Status: SHIPPED | OUTPATIENT
Start: 2025-01-14 | End: 2025-01-19

## 2025-01-14 RX ORDER — BENZONATATE 100 MG/1
100 CAPSULE ORAL 3 TIMES DAILY PRN
Qty: 30 CAPSULE | Refills: 0 | Status: SHIPPED | OUTPATIENT
Start: 2025-01-14 | End: 2025-01-24

## 2025-01-14 RX ORDER — GUAIFENESIN 600 MG/1
600 TABLET, EXTENDED RELEASE ORAL 2 TIMES DAILY
Qty: 20 TABLET | Refills: 0 | Status: SHIPPED | OUTPATIENT
Start: 2025-01-14 | End: 2025-01-24

## 2025-01-14 NOTE — PROGRESS NOTES
"Subjective:      Patient ID: Michael Hawkins is a 84 y.o. male.    Vitals:  height is 5' 9" (1.753 m) and weight is 84.4 kg (186 lb). His temperature is 97.6 °F (36.4 °C). His blood pressure is 106/65 and his pulse is 72. His respiration is 18 and oxygen saturation is 98%.     Chief Complaint: Cough    Cough  This is a new problem. The current episode started 1 to 4 weeks ago (x's 2 weeks). The problem has been waxing and waning. The cough is Productive of sputum. Associated symptoms include nasal congestion, postnasal drip and shortness of breath. Pertinent negatives include no chest pain, chills, ear pain, eye redness, fever, headaches, rash, sore throat or wheezing. He has tried prescription cough suppressant and steroid inhaler for the symptoms. The treatment provided mild relief. His past medical history is significant for bronchitis. There is no history of environmental allergies.       Constitution: Positive for fatigue. Negative for activity change, appetite change, chills, fever, unexpected weight change and generalized weakness.   HENT:  Positive for congestion and postnasal drip. Negative for ear pain, ear discharge, foreign body in ear, tinnitus, hearing loss, dental problem, mouth sores, tongue pain, facial swelling, sinus pain, sinus pressure, sore throat, trouble swallowing and voice change.    Neck: Negative for neck pain, neck stiffness and painful lymph nodes.   Cardiovascular:  Negative for chest pain, leg swelling, palpitations and sob on exertion.   Eyes:  Negative for eye trauma, eye discharge, eye itching, eye pain, eye redness, vision loss and eyelid swelling.   Respiratory:  Positive for cough, sputum production and shortness of breath. Negative for chest tightness, COPD, wheezing and asthma.    Gastrointestinal:  Negative for abdominal pain, nausea, vomiting, constipation, diarrhea, bright red blood in stool and dark colored stools.   Endocrine: hair loss, cold intolerance and heat " intolerance.   Genitourinary:  Negative for dysuria, frequency, urgency, flank pain and hematuria.   Musculoskeletal:  Negative for pain, trauma, joint pain, joint swelling, abnormal ROM of joint, back pain and muscle cramps.   Skin:  Negative for color change, pale, rash, wound and hives.   Allergic/Immunologic: Negative for environmental allergies, seasonal allergies, food allergies, asthma, hives and itching.   Neurological:  Negative for dizziness, history of vertigo, light-headedness, facial drooping, speech difficulty, headaches, disorientation, altered mental status, loss of consciousness and numbness.   Hematologic/Lymphatic: Negative for swollen lymph nodes and easy bruising/bleeding. Does not bruise/bleed easily.   Psychiatric/Behavioral:  Negative for altered mental status, disorientation, confusion, agitation, sleep disturbance and hallucinations.       Objective:     Physical Exam   Constitutional: He is oriented to person, place, and time. He appears well-developed. He is cooperative.   HENT:   Head: Normocephalic and atraumatic.   Ears:   Right Ear: Hearing, tympanic membrane, external ear and ear canal normal.   Left Ear: Hearing, tympanic membrane, external ear and ear canal normal.   Nose: Mucosal edema and rhinorrhea present. No nasal deformity. No epistaxis. Right sinus exhibits no maxillary sinus tenderness and no frontal sinus tenderness. Left sinus exhibits no maxillary sinus tenderness and no frontal sinus tenderness.   Mouth/Throat: Uvula is midline and mucous membranes are normal. No trismus in the jaw. Normal dentition. No uvula swelling. Posterior oropharyngeal erythema present.   Eyes: Conjunctivae and lids are normal.   Neck: Trachea normal and phonation normal. Neck supple.   Cardiovascular: Normal rate, regular rhythm, normal heart sounds and normal pulses.   Pulmonary/Chest: Effort normal and breath sounds normal.   Rhonchorous breath sounds bilaterally         Comments: Rhonchorous  breath sounds bilaterally    Abdominal: Normal appearance and bowel sounds are normal. Soft.   Musculoskeletal: Normal range of motion.         General: Normal range of motion.   Neurological: He is alert and oriented to person, place, and time. He exhibits normal muscle tone.   Skin: Skin is warm, dry and intact.   Psychiatric: His speech is normal and behavior is normal. Judgment and thought content normal.   Nursing note and vitals reviewed.      Assessment:     1. Bronchitis    2. Dyspnea, unspecified type    3. Cough, unspecified type        Plan:       Bronchitis  -     doxycycline (VIBRAMYCIN) 100 MG Cap; Take 1 capsule (100 mg total) by mouth 2 (two) times daily. for 5 days  Dispense: 10 capsule; Refill: 0  -     guaiFENesin (MUCINEX) 600 mg 12 hr tablet; Take 1 tablet (600 mg total) by mouth 2 (two) times daily. for 10 days  Dispense: 20 tablet; Refill: 0    Dyspnea, unspecified type  -     XR CHEST PA AND LATERAL; Future; Expected date: 01/14/2025    Cough, unspecified type  -     SARS Coronavirus 2 Antigen, POCT Manual Read  -     POCT Influenza A/B Rapid Antigen  -     benzonatate (TESSALON) 100 MG capsule; Take 1 capsule (100 mg total) by mouth 3 (three) times daily as needed for Cough.  Dispense: 30 capsule; Refill: 0    Results of x-ray reviewed by LOC King and discussed with patient.  Utilize over-the-counter Tylenol or Motrin as directed for fever.    Ensure adequate fluid intake with electrolytes.    Return to clinic for new or worsening symptoms.  Patient is recommended to follow-up with their PCP post discharge.    Total time spent on med rec, H&P, with over half of the time in direct patient care: 39 minutes      DISCLAIMER: Please note that my documentation in this Electronic Healthcare Record was produced using speech recognition software and therefore may contain errors related to that software system.These could include grammar, punctuation and spelling errors or the  inclusion/exclusion of phrases that were not intended. Garbled syntax, mangled pronouns, and other bizarre constructions may be attributed to that software system.        Additional MDM:     Heart Failure Score:   COPD = No

## 2025-02-17 DIAGNOSIS — I70.0 AORTIC ATHEROSCLEROSIS: ICD-10-CM

## 2025-02-17 DIAGNOSIS — I25.110 ATHEROSCLEROSIS OF NATIVE CORONARY ARTERY OF NATIVE HEART WITH UNSTABLE ANGINA PECTORIS: ICD-10-CM

## 2025-02-17 DIAGNOSIS — Q27.30 ARTERIOVENOUS MALFORMATION (AVM): ICD-10-CM

## 2025-02-17 DIAGNOSIS — I10 ESSENTIAL HYPERTENSION: ICD-10-CM

## 2025-02-18 RX ORDER — METOPROLOL SUCCINATE 25 MG/1
25 TABLET, EXTENDED RELEASE ORAL 2 TIMES DAILY
Qty: 180 TABLET | Refills: 3 | Status: SHIPPED | OUTPATIENT
Start: 2025-02-18

## 2025-03-07 DIAGNOSIS — I25.110 ATHEROSCLEROSIS OF NATIVE CORONARY ARTERY OF NATIVE HEART WITH UNSTABLE ANGINA PECTORIS: ICD-10-CM

## 2025-03-07 DIAGNOSIS — I10 ESSENTIAL HYPERTENSION: ICD-10-CM

## 2025-03-07 RX ORDER — ISOSORBIDE MONONITRATE 60 MG/1
60 TABLET, EXTENDED RELEASE ORAL
Qty: 90 TABLET | Refills: 3 | Status: SHIPPED | OUTPATIENT
Start: 2025-03-07

## (undated) DEVICE — GUIDEWIRE OMNI STR TIP 185CM

## (undated) DEVICE — GUIDEWIRE INQWIRE SE 3MM JTIP

## (undated) DEVICE — HEMOSTAT VASC BAND REG 24CM

## (undated) DEVICE — GUIDEWIRE RUNTHROUGH EF 180CM

## (undated) DEVICE — KIT GLIDESHEATH SLEND 6FR 10CM

## (undated) DEVICE — CATH LNCHR EB35 6F 110CM

## (undated) DEVICE — CATH DXTERITY JR40 100CM 5FR

## (undated) DEVICE — CATH EAGLE EYE PLATINUM

## (undated) DEVICE — CATH DXTERITY JL35 100CM 5FR